# Patient Record
Sex: MALE | Race: BLACK OR AFRICAN AMERICAN | Employment: OTHER | URBAN - METROPOLITAN AREA
[De-identification: names, ages, dates, MRNs, and addresses within clinical notes are randomized per-mention and may not be internally consistent; named-entity substitution may affect disease eponyms.]

---

## 2017-05-23 ENCOUNTER — OFFICE VISIT (OUTPATIENT)
Dept: CARDIOLOGY CLINIC | Age: 67
End: 2017-05-23

## 2017-05-23 VITALS
BODY MASS INDEX: 26.6 KG/M2 | WEIGHT: 190 LBS | OXYGEN SATURATION: 97 % | DIASTOLIC BLOOD PRESSURE: 80 MMHG | HEART RATE: 97 BPM | HEIGHT: 71 IN | SYSTOLIC BLOOD PRESSURE: 200 MMHG

## 2017-05-23 DIAGNOSIS — I10 ESSENTIAL HYPERTENSION: Primary | ICD-10-CM

## 2017-05-23 DIAGNOSIS — I25.10 CORONARY ARTERY DISEASE INVOLVING NATIVE CORONARY ARTERY OF NATIVE HEART WITHOUT ANGINA PECTORIS: ICD-10-CM

## 2017-05-23 DIAGNOSIS — E78.00 HYPERCHOLESTEREMIA: ICD-10-CM

## 2017-05-23 NOTE — PROGRESS NOTES
1. Have you been to the ER, urgent care clinic since your last visit? Hospitalized since your last visit? No     2. Have you seen or consulted any other health care providers outside of the [address] [place] [address] since your last visit? Include any pap smears or colon screening.  No

## 2017-05-23 NOTE — PATIENT INSTRUCTIONS
Verbal order per Dr Danna Dong  Pt to take medication, recheck BP over at Fire station and go to Emergency Department if no improvement. Regular follow up in 1 year.

## 2017-05-23 NOTE — MR AVS SNAPSHOT
Visit Information Date & Time Provider Department Dept. Phone Encounter #  
 5/23/2017 10:40 AM Tyler Tellez MD [facility] [address] [id number] Your Appointments 6/6/2017  8:15 AM  
Follow Up with Annetta Rodriguez MD  
[facility] ([facility]) Appt Note: 6 mo f/u  
 [address], [address] [place] [address]  
  
   
 [address], [address] [place] [address] Upcoming Health Maintenance Date Due Hepatitis C Screening 1950 DTaP/Tdap/Td series (1 - Tdap) 4/3/1971 ZOSTER VACCINE AGE 60> 4/3/2010 GLAUCOMA SCREENING Q2Y 4/3/2015 INFLUENZA AGE 9 TO ADULT 8/1/2017 Pneumococcal 65+ Low/Medium Risk (2 of 2 - PPSV23) 12/6/2017 MEDICARE YEARLY EXAM 12/7/2017 COLONOSCOPY 10/28/2020 Allergies as of 5/23/2017  Review Complete On: 5/23/2017 By: Tyler Tellez MD  
  
 Severity Noted Reaction Type Reactions Lisinopril High   Anaphylaxis, Swelling Swelling of tongue, 
angioedema Current Immunizations  Never Reviewed No immunizations on file. Not reviewed this visit You Were Diagnosed With   
  
 Codes Comments Coronary artery disease involving native coronary artery of native heart without angina pectoris    -  Primary ICD-10-CM: I25.10 ICD-9-CM: 414.01 Essential hypertension     ICD-10-CM: I10 
ICD-9-CM: 401.9 Hypercholesteremia     ICD-10-CM: E78.00 ICD-9-CM: 272.0 Vitals BP Pulse Height(growth percentile) Weight(growth percentile) SpO2 BMI  
 200/80 97 5' 11\" (1.803 m) 190 lb (86.2 kg) 97% 26.5 kg/m2 Smoking Status Current Every Day Smoker Vitals History BMI and BSA Data Body Mass Index Body Surface Area  
 26.5 kg/m 2 2.08 m 2 Preferred Pharmacy Pharmacy Name Phone [facility], [address] [place] [address] Your Updated Medication List  
  
   
This list is accurate as of: 5/23/17 11:13 AM.  Always use your most recent med list. amLODIPine 10 mg tablet Commonly known as:  NORVASC  
take 1 tablet by mouth once daily  
  
 aspirin 325 mg tablet Commonly known as:  ASPIRIN Take 325 mg by mouth daily. FISH OIL 1,000 mg Cap Generic drug:  omega-3 fatty acids-vitamin e Take 1 capsule by mouth as needed. isosorbide mononitrate ER 30 mg tablet Commonly known as:  IMDUR  
take 1 tablet by mouth once daily  
  
 metoprolol succinate 200 mg XL tablet Commonly known as:  TOPROL-XL  
take 1 tablet by mouth once daily  
  
 nicotinic acid 500 mg tablet Commonly known as:  NIACIN Take 500 mg by mouth two (2) times daily (with meals). PRAVACHOL 80 mg tablet Generic drug:  pravastatin Take 80 mg by mouth daily. We Performed the Following AMB POC EKG ROUTINE W/ 12 LEADS, INTER & REP [70738 CPT(R)] Patient Instructions Verbal order per Dr Kian Morris Pt to take medication, recheck BP over at Fire station and go to Emergency Department if no improvement. Regular follow up in 1 year. Introducing [facility]! Montrell Perry introduces [employer] patient portal. Now you can access parts of your medical record, email your doctor's office, and request medication refills online. 1. In your internet browser, go to [email or web address] "[email or web address] 2. Click on the First Time User? Click Here link in the Sign In box. You will see the New Member Sign Up page. 3. Enter your [employer] Access Code exactly as it appears below. You will not need to use this code after youve completed the sign-up process. If you do not sign up before the expiration date, you must request a new code. · Open [employer] Access Code: [email or web address] Expires: 8/21/2017 10:41 AM 
 
4.  Enter the last four digits of your Social Security Number (xxxx) and Date of Birth (mm/dd/yyyy) as indicated and click Submit. You will be taken to the next sign-up page. 5. Create a [employer] ID. This will be your [employer] login ID and cannot be changed, so think of one that is secure and easy to remember. 6. Create a [employer] password. You can change your password at any time. 7. Enter your Password Reset Question and Answer. This can be used at a later time if you forget your password. 8. Enter your e-mail address. You will receive e-mail notification when new information is available in [address] 9. Click Sign Up. You can now view and download portions of your medical record. 10. Click the Download Summary menu link to download a portable copy of your medical information. If you have questions, please visit the Frequently Asked Questions section of the [employer] website. Remember, [employer] is NOT to be used for urgent needs. For medical emergencies, dial 911. Now available from your iPhone and Android! Please provide this summary of care documentation to your next provider. Your primary care clinician is listed as Nannette Encinas. If you have any questions after today's visit, please call [phone].

## 2017-05-23 NOTE — PROGRESS NOTES
HISTORY OF PRESENT ILLNESS  Pillo Neely is a 79 y.o. male. ASSESSMENT and PLAN    Mr. Esteban Felton has history of small vessel disease in his coronary circulation. He has done well on medical therapy. Unfortunately, he still smokes cigarettes about one pack per day. He also drinks beer, 2-4 beers daily. Because of erectile dysfunction, he had taken Cialis in the past. He was able to come off long-acting nitrates by cutting back on his alcohol intake.  CAD:   As noted above, he has small vessel disease and requires continued medical therapy.  BP:   Severely elevated today. He has medication noncompliance. Last time I saw the patient was in September 2015. He has not taken his medications today. I advised him to go home and take his medicines and come back for repeat blood pressure check. He is completely asymptomatic.  HR:    Upper normal.   CHF:   Currently, there is no evidence of decompensated CHF noted.  Weight:   His weight today is 190 pounds. His weight back in September 2015 was 201 pounds.  Cholesterol:   Target LDL <70. He states that he is continuing to take niacin.  Tobacco: Again, I advised him to discontinue tobacco use completely.  Alcohol: Unfortunately, he still drinks alcohol. Again, strong encouragement and lengthy discussion was carried on about the need to change his lifestyle.  Anti-platelet:   Remains on ASA. A lengthy discussion was carried on about the importance of medication compliance, follow-up compliance, and lifestyle changes. Greater than 30 minutes spent. As noted above, I did advise him take his medications as prescribed. He will have his blood pressure rechecked later today. I recommended coming back to the office; however, because of difficulty arranging rides, he may have the blood pressure check at a fire station and get back in touch with us.   I did tell him that if he his blood pressure remains elevated severely, he may need to go to the [facility] for blood pressure control. I will see him back annually. Thank you. Encounter Diagnoses   Name Primary?  Essential hypertension Yes    Coronary artery disease involving native coronary artery of native heart without angina pectoris     Hypercholesteremia      current treatment plan is effective, no change in therapy  lab results and schedule of future lab studies reviewed with patient  reviewed diet, exercise and weight control  very strongly urged to quit smoking to reduce cardiovascular risk  cardiovascular risk and specific lipid/LDL goals reviewed  use of aspirin to prevent MI and TIA's discussed      HPI   Today, Mr. de la cruz is no complaints of chest pains, increased shortness of breath or decreased exercise capacity. Unfortunately, he did not take his blood pressure medications before leaving his house this morning. Also, the last time I saw the patient was in September 2015. He has difficulty with compliance. He denies any orthopnea or PND. He denies any palpitations or dizziness. Unfortunately, he still smokes cigarettes and drinks alcohol. This is despite the fact that multiple discussions were carried on. Review of Systems   Respiratory: Negative for shortness of breath. Cardiovascular: Negative for chest pain, palpitations, orthopnea, claudication, leg swelling and PND. All other systems reviewed and are negative. Physical Exam   Constitutional: He is oriented to person, place, and time. He appears well-developed and well-nourished. HENT:   Head: Normocephalic. Eyes: Conjunctivae are normal.   Neck: Neck supple. No JVD present. Carotid bruit is not present. No thyromegaly present. Cardiovascular: Normal rate and regular rhythm. No murmur heard. Pulmonary/Chest: Breath sounds normal.   Abdominal: Bowel sounds are normal.   Musculoskeletal: He exhibits no edema. Neurological: He is alert and oriented to person, place, and time.    Skin: Skin is warm and dry.   Nursing note and vitals reviewed. PCP: Alba Reyna MD    Past Medical History:   Diagnosis Date    CAD (coronary artery disease)     Cardiac catheterization 03/23/2001    RCA patent. m/dRPDA 55%. LM patent. LAD patent. CX patent. oOMB 75%. LVEDP 8-10 mmHg. EF 50%. Global hypk. Patent bilateral renal arteries.  Cardiac echocardiogram 10/24/2012    EF 55-60%. No WMA. Mild conc LVH. Gr 1 DDfx. RVSP 20-25 mmHg. Mild LAE. Mild ADAM. Possible sm pericardial effusion but likely fat pad.  Cardiac nuclear imaging test, low risk 10/24/2012    No ischemia or prior infarction. EF 58%. Normal EKG on max EST. Ex time 6 min. Low risk. 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       Current Outpatient Prescriptions   Medication Sig Dispense Refill    metoprolol succinate (TOPROL-XL) 200 mg XL tablet take 1 tablet by mouth once daily 30 Tab 11    amLODIPine (NORVASC) 10 mg tablet take 1 tablet by mouth once daily 30 Tab 11    isosorbide mononitrate ER (IMDUR) 30 mg tablet take 1 tablet by mouth once daily 30 Tab 11    pravastatin (PRAVACHOL) 80 mg tablet Take 80 mg by mouth daily.  omega-3 fatty acids-vitamin e (FISH OIL) 1,000 mg Cap Take 1 capsule by mouth as needed.  aspirin (ASPIRIN) 325 mg tablet Take 325 mg by mouth daily.  nicotinic acid (NIACIN) 500 mg tablet Take 500 mg by mouth two (2) times daily (with meals).          The patient has a family history of    Social History   Substance Use Topics    Smoking status: Current Every Day Smoker     Packs/day: 2.50    Smokeless tobacco: Never Used    Alcohol use Yes      Comment: 10 beers a day       Lab Results   Component Value Date/Time    Cholesterol, total 191 12/06/2016 08:30 AM    HDL Cholesterol 40 12/06/2016 08:30 AM    LDL, calculated 116 12/06/2016 08:30 AM    Triglyceride 175 12/06/2016 08:30 AM    CHOL/HDL Ratio 4.8 12/06/2016 08:30 AM        BP Readings from Last 3 Encounters:   05/23/17 200/80   12/06/16 138/80   06/01/16 136/64        Pulse Readings from Last 3 Encounters:   05/23/17 97   12/06/16 84   06/01/16 86       Wt Readings from Last 3 Encounters:   05/23/17 86.2 kg (190 lb)   12/06/16 89.8 kg (198 lb)   06/01/16 89.8 kg (198 lb)         EKG: unchanged from previous tracings, normal sinus rhythm, occasional PVC noted, unifocal, LVH with repolarization changes.

## 2017-06-06 ENCOUNTER — HOSPITAL ENCOUNTER (OUTPATIENT)
Dept: LAB | Age: 67
Discharge: HOME | End: 2017-06-06
Payer: MEDICARE

## 2017-06-06 ENCOUNTER — OFFICE VISIT (OUTPATIENT)
Dept: INTERNAL MEDICINE CLINIC | Age: 67
End: 2017-06-06

## 2017-06-06 VITALS
HEIGHT: 71 IN | RESPIRATION RATE: 16 BRPM | SYSTOLIC BLOOD PRESSURE: 160 MMHG | OXYGEN SATURATION: 97 % | BODY MASS INDEX: 26.74 KG/M2 | HEART RATE: 80 BPM | DIASTOLIC BLOOD PRESSURE: 100 MMHG | TEMPERATURE: 98.6 F | WEIGHT: 191 LBS

## 2017-06-06 DIAGNOSIS — E78.2 MIXED HYPERLIPIDEMIA: ICD-10-CM

## 2017-06-06 DIAGNOSIS — F10.10 ALCOHOL ABUSE: ICD-10-CM

## 2017-06-06 DIAGNOSIS — I10 ESSENTIAL HYPERTENSION: ICD-10-CM

## 2017-06-06 DIAGNOSIS — I10 ESSENTIAL HYPERTENSION: Primary | ICD-10-CM

## 2017-06-06 LAB
ALBUMIN SERPL BCP-MCNC: 3.9 G/DL (ref 3.4–5)
ALBUMIN/GLOB SERPL: 1.1 {RATIO} (ref 0.8–1.7)
ALP SERPL-CCNC: 69 U/L (ref 45–117)
ALT SERPL-CCNC: 20 U/L (ref 16–61)
ANION GAP BLD CALC-SCNC: 8 MMOL/L (ref 3–18)
AST SERPL W P-5'-P-CCNC: 45 U/L (ref 15–37)
BASOPHILS # BLD AUTO: 0 K/UL (ref 0–0.06)
BASOPHILS # BLD: 1 % (ref 0–2)
BILIRUB SERPL-MCNC: 0.5 MG/DL (ref 0.2–1)
BUN SERPL-MCNC: 7 MG/DL (ref 7–18)
BUN/CREAT SERPL: 10 (ref 12–20)
CALCIUM SERPL-MCNC: 9.4 MG/DL (ref 8.5–10.1)
CHLORIDE SERPL-SCNC: 99 MMOL/L (ref 100–108)
CHOLEST SERPL-MCNC: 172 MG/DL
CO2 SERPL-SCNC: 28 MMOL/L (ref 21–32)
CREAT SERPL-MCNC: 0.71 MG/DL (ref 0.6–1.3)
DIFFERENTIAL METHOD BLD: ABNORMAL
EOSINOPHIL # BLD: 0.1 K/UL (ref 0–0.4)
EOSINOPHIL NFR BLD: 1 % (ref 0–5)
ERYTHROCYTE [DISTWIDTH] IN BLOOD BY AUTOMATED COUNT: 18.5 % (ref 11.6–14.5)
GLOBULIN SER CALC-MCNC: 3.7 G/DL (ref 2–4)
GLUCOSE SERPL-MCNC: 108 MG/DL (ref 74–99)
HCT VFR BLD AUTO: 38.4 % (ref 36–48)
HDLC SERPL-MCNC: 54 MG/DL (ref 40–60)
HDLC SERPL: 3.2 {RATIO} (ref 0–5)
HGB BLD-MCNC: 12.3 G/DL (ref 13–16)
LDLC SERPL CALC-MCNC: 92 MG/DL (ref 0–100)
LIPID PROFILE,FLP: NORMAL
LYMPHOCYTES # BLD AUTO: 34 % (ref 21–52)
LYMPHOCYTES # BLD: 1.3 K/UL (ref 0.9–3.6)
MCH RBC QN AUTO: 31.4 PG (ref 24–34)
MCHC RBC AUTO-ENTMCNC: 32 G/DL (ref 31–37)
MCV RBC AUTO: 98 FL (ref 74–97)
MONOCYTES # BLD: 0.4 K/UL (ref 0.05–1.2)
MONOCYTES NFR BLD AUTO: 10 % (ref 3–10)
NEUTS SEG # BLD: 2.1 K/UL (ref 1.8–8)
NEUTS SEG NFR BLD AUTO: 54 % (ref 40–73)
PLATELET # BLD AUTO: 167 K/UL (ref 135–420)
PMV BLD AUTO: 11.8 FL (ref 9.2–11.8)
POTASSIUM SERPL-SCNC: 3.3 MMOL/L (ref 3.5–5.5)
PROT SERPL-MCNC: 7.6 G/DL (ref 6.4–8.2)
RBC # BLD AUTO: 3.92 M/UL (ref 4.7–5.5)
SODIUM SERPL-SCNC: 135 MMOL/L (ref 136–145)
TRIGL SERPL-MCNC: 130 MG/DL (ref ?–150)
VLDLC SERPL CALC-MCNC: 26 MG/DL
WBC # BLD AUTO: 3.9 K/UL (ref 4.6–13.2)

## 2017-06-06 PROCEDURE — 85025 COMPLETE CBC W/AUTO DIFF WBC: CPT | Performed by: INTERNAL MEDICINE

## 2017-06-06 PROCEDURE — 80053 COMPREHEN METABOLIC PANEL: CPT | Performed by: INTERNAL MEDICINE

## 2017-06-06 PROCEDURE — 80061 LIPID PANEL: CPT | Performed by: INTERNAL MEDICINE

## 2017-06-06 RX ORDER — SPIRONOLACTONE 25 MG/1
TABLET ORAL
Qty: 180 TAB | Refills: 1 | Status: SHIPPED | OUTPATIENT
Start: 2017-06-06 | End: 2019-03-28 | Stop reason: SDUPTHER

## 2017-06-06 NOTE — PROGRESS NOTES
1. Have you been to the ER, urgent care clinic since your last visit? Hospitalized since your last visit? No    2. Have you seen or consulted any other health care providers outside of the [address] [place] [address] since your last visit? Include any pap smears or colon screening.  No

## 2017-06-06 NOTE — PROGRESS NOTES
HPI:   routine f/u of HTN, hyperlipidemia, CAD, alcoholism  Still drinking heavily  Chol 191 Dec 2016  w/o chest pain/abd. discomfort; no dyspnea, cough or pedal edema; denies constitutional complaints of fever, night sweats or wt loss; no evidence of GI/ hemorrhage; no polyuria/polydipsia. Activity is age appropriate. ROS is otherwise negative. Past Medical History:   Diagnosis Date    CAD (coronary artery disease)     Cardiac catheterization 03/23/2001    RCA patent. m/dRPDA 55%. LM patent. LAD patent. CX patent. oOMB 75%. LVEDP 8-10 mmHg. EF 50%. Global hypk. Patent bilateral renal arteries.  Cardiac echocardiogram 10/24/2012    EF 55-60%. No WMA. Mild conc LVH. Gr 1 DDfx. RVSP 20-25 mmHg. Mild LAE. Mild ADAM. Possible sm pericardial effusion but likely fat pad.  Cardiac nuclear imaging test, low risk 10/24/2012    No ischemia or prior infarction. EF 58%. Normal EKG on max EST. Ex time 6 min. Low risk. 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       Social History     Social History    Marital status: SINGLE     Spouse name: N/A    Number of children: N/A    Years of education: N/A     Occupational History    Not on file.      Social History Main Topics    Smoking status: Current Every Day Smoker     Packs/day: 2.50    Smokeless tobacco: Never Used    Alcohol use Yes      Comment: 10 beers a day    Drug use: No    Sexual activity: Not on file     Other Topics Concern    Not on file     Social History Narrative       Allergies   Allergen Reactions    Lisinopril Anaphylaxis and Swelling     Swelling of tongue,  angioedema       Family History   Problem Relation Age of Onset    Heart Disease Mother     Heart Surgery Mother     Dementia Father        Current Outpatient Prescriptions   Medication Sig Dispense Refill    metoprolol succinate (TOPROL-XL) 200 mg XL tablet take 1 tablet by mouth once daily 30 Tab 11    amLODIPine (NORVASC) 10 mg tablet take 1 tablet by mouth once daily 30 Tab 11    isosorbide mononitrate ER (IMDUR) 30 mg tablet take 1 tablet by mouth once daily 30 Tab 11    pravastatin (PRAVACHOL) 80 mg tablet Take 80 mg by mouth daily.  omega-3 fatty acids-vitamin e (FISH OIL) 1,000 mg Cap Take 1 capsule by mouth as needed.  aspirin (ASPIRIN) 325 mg tablet Take 325 mg by mouth daily.  nicotinic acid (NIACIN) 500 mg tablet Take 500 mg by mouth two (2) times daily (with meals). Visit Vitals    BP (!) 160/100 (BP 1 Location: Left arm, BP Patient Position: Sitting)    Pulse 80    Temp 98.6 °F (37 °C) (Tympanic)    Resp 16    Ht 5' 11\" (1.803 m)    Wt 191 lb (86.6 kg)    SpO2 97%    BMI 26.64 kg/m2       PE  Well nourished in NAD  HEENT:   OP: clear. Neck: supple w/o mass or bruits. Chest: clear. CV: RRR w/o m,r,g; pulses intact. Abd: soft, NT, w/o HSM or mass. Rectal: heme neg; prostate 3 FBS and smooth  Ext: w/o edema. Neuro: NF. Assessment and Plan    Encounter Diagnoses   Name Primary?  Essential hypertension Yes    Mixed hyperlipidemia     Alcohol abuse    HTN - uncontrolled; add aldactone 25 mg bid  Hyperlipidemia - labs soon to assess  Alcoholism - decrease intake with optimal goal of stopping  Keep f/u with cards - stable CAD  I have reviewed/discussed the above normal BMI with the patient. I have recommended the following interventions: dietary management education, guidance, and counseling . Nicole Landa The patient was counseled on the dangers of tobacco use, and was advised to quit.   Reviewed strategies to maximize success, including removing cigarettes and smoking materials from environment.   OV 6 mos or prn  I have explained plan to patient and the patient verbalizes understanding

## 2017-06-06 NOTE — MR AVS SNAPSHOT
Visit Information Date & Time Provider Department Dept. Phone Encounter #  
 6/6/2017  8:15 AM Crow Small MD [facility] [place]li Us [phone] [id number] Follow-up Instructions Return if symptoms worsen or fail to improve. Follow-up and Disposition History Your Appointments 5/22/2018 11:00 AM  
Follow Up with Mariana Gandara MD  
[facility] ([facility]) Appt Note: 1 year follow up with an EKG  
 Wyatt Donovan [id number] [phone] [address] Upcoming Health Maintenance Date Due Hepatitis C Screening 1950 DTaP/Tdap/Td series (1 - Tdap) 4/3/1971 ZOSTER VACCINE AGE 60> 4/3/2010 GLAUCOMA SCREENING Q2Y 4/3/2015 INFLUENZA AGE 9 TO ADULT 8/1/2017 Pneumococcal 65+ Low/Medium Risk (2 of 2 - PPSV23) 12/6/2017 MEDICARE YEARLY EXAM 12/7/2017 COLONOSCOPY 10/28/2020 Allergies as of 6/6/2017  Review Complete On: 6/6/2017 By: Crow Small MD  
  
 Severity Noted Reaction Type Reactions Lisinopril High   Anaphylaxis, Swelling Swelling of tongue, 
angioedema Current Immunizations  Never Reviewed No immunizations on file. Not reviewed this visit You Were Diagnosed With   
  
 Codes Comments Essential hypertension    -  Primary ICD-10-CM: I10 
ICD-9-CM: 401.9 Mixed hyperlipidemia     ICD-10-CM: E78.2 ICD-9-CM: 272.2 Alcohol abuse     ICD-10-CM: F10.10 ICD-9-CM: 305.00 Vitals BP Pulse Temp Resp Height(growth percentile) Weight(growth percentile) (!) 160/100 (BP 1 Location: Left arm, BP Patient Position: Sitting) 80 98.6 °F (37 °C) (Tympanic) 16 5' 11\" (1.803 m) 191 lb (86.6 kg) SpO2 BMI Smoking Status 97% 26.64 kg/m2 Current Every Day Smoker Vitals History BMI and BSA Data Body Mass Index Body Surface Area 26.64 kg/m 2 2.08 m 2 Preferred Pharmacy [facility]acy Name Phone [facility] [address] Your Updated Medication List  
  
   
This list is accurate as of: 17  8:46 AM.  Always use your most recent med list. amLODIPine 10 mg tablet Commonly known as:  NORVASC  
take 1 tablet by mouth once daily  
  
 aspirin 325 mg tablet Commonly known as:  ASPIRIN Take 325 mg by mouth daily. FISH OIL 1,000 mg Cap Generic drug:  omega-3 fatty acids-vitamin e Take 1 capsule by mouth as needed. isosorbide mononitrate ER 30 mg tablet Commonly known as:  IMDUR  
take 1 tablet by mouth once daily  
  
 metoprolol succinate 200 mg XL tablet Commonly known as:  TOPROL-XL  
take 1 tablet by mouth once daily  
  
 nicotinic acid 500 mg tablet Commonly known as:  NIACIN Take 500 mg by mouth two (2) times daily (with meals). PRAVACHOL 80 mg tablet Generic drug:  pravastatin Take 80 mg by mouth daily. spironolactone 25 mg tablet Commonly known as:  ALDACTONE  
1 bid Prescriptions Sent to Pharmacy Refills  
 spironolactone (ALDACTONE) 25 mg tablet 1 Si bid Class: Normal  
 [facility] [address] [facility] #: [phone] Follow-up Instructions Return if symptoms worsen or fail to improve. Patient Instructions Learning About Alcohol Abuse and Addiction in Teens What is alcohol abuse? Alcohol abuse means having unhealthy or dangerous drinking habits, such as drinking every day or drinking too much at a time. Alcohol abuse can harm you and may cause you to harm others. You may think a drink or two is okay, even if it is illegal. But teens who drink are more likely to develop an alcohol problem than people who start drinking later in life. Teens who continue to abuse alcohol may develop a strong need, or craving, for alcohol, and it may get harder to say \"no\" to drinking. You may begin to find alcohol more fun than anything else. Or you may want to stop drinking but can't. You may become addicted to alcohol. This is also called alcoholism. If you become addicted, then alcohol controls your life. You may continue to drink even though it can harm your relationships, lead to trouble with the law, and/or cause physical problems. Why do teens drink alcohol? Teens may use alcohol for many reasons. They may want to: · Fit in with friends or certain groups. · Feel good. · Seem more grown up. · Rebel against parents. · Escape problems. For example, teens may drink to try to: ¨ Get rid of the symptoms of mental health problems such as attention deficit hyperactivity disorder (ADHD) or depression. ¨ Ease feelings of insecurity. ¨ Forget about physical or sexual abuse. What problems can alcohol cause? Alcohol can change how well you make decisions, how well you think, and how quickly you can react. It can make it hard for you to control your actions. Alcohol use can: · Make car accidents more likely. If you drink and drive, you can easily have an accident and hurt yourself or others. Do not drive if you have been drinking, and do not ride in a car (or any type of vehicle) with someone who has been drinking. · Lead to unprotected sex and/or sexual assault. This can lead to pregnancy and sexually transmitted infections, including HIV. · Cause you to do things you wouldn't usually do. You may say things that hurt your friends or do something illegal that could result in paying a large fine, losing your 's license, or having other legal problems. · Cause you to lose interest in school and your future. Poor grades or lack of focus may make it harder to reach your dreams. Alcohol use also can change how you feel about your life.  It can lead to depression and suicide. How do you say no to alcohol? If someone offers you a drink, here are some ways to say \"no. \" · Look the person in the eye and say \"No thanks. \" Sometimes that is all you need to do. Say it as many times as you need to. Also ask the person not to ask you again: \"I'm cool with my decision, so don't bother me again. \" · Say why you don't want to drink. Here are some examples: \"I don't like how I act when I'm drinking,\" \"I like to know what I'm doing,\" \"If my parents find out, they'll take my car away,\" or \"I have to practice with my band tomorrow. \" 
· Walk out. It's okay to leave a party or group where others are drinking. · Offer another idea. \"I'd rather play video games\" or \"Let's listen to some music. \" By doing this, you might also prevent your friend from drinking. · Ask for respect. Make it clear that you don't want to drink and that continuing to ask you is showing no respect for your opinions. \"I don't give you a hard time, so why are you giving me a hard time? \" · Think ahead. If you think you might go someplace where people are drinking, don't go. But if you do go, think in advance about what you will do if someone offers you a drink. How is alcohol abuse treated? Treatment depends on how bad your alcohol problem is. Some teens are able to stop drinking with help from a school alcohol education program or a counselor. Treatment also can include group therapy. Teens who are addicted to alcohol may need medical treatment and may need to stay in a hospital or treatment center. Treatment focuses on more than alcohol. It also helps you cope with the anger, frustration, sadness, and disappointment that often happen when a person tries to stop drinking. Treatment also looks at other parts of your life, like your relationships with friends and family, school and work, medical problems, and living situation. It helps you find and manage problems.  Treatment helps you take control of your life so you don't have to depend on alcohol. An alcohol problem affects the whole family. Family counseling often is part of treatment. Where can you learn more? Go to http://ivonne-jimy.info/. Enter K930 in the search box to learn more about \"Learning About Alcohol Abuse and Addiction in Teens. \" Current as of: November 3, 2016 Content Version: 11.2 © 4677-3041 [employer]. Care instructions adapted under license by [employer] (which disclaims liability or warranty for this information). If you have questions about a medical condition or this instruction, always ask your healthcare professional. [place] [address] any warranty or liability for your use of this information. Introducing hospitals & HEALTH SERVICES! [employer] introduces Advanced Telemetry patient portal. Now you can access parts of your medical record, email your doctor's office, and request medication refills online. 1. In your internet browser, go to [email or web address] 2. Click on the First Time User? Click Here link in the Sign In box. You will see the New Member Sign Up page. 3. Enter your Advanced Telemetry Access Code exactly as it appears below. You will not need to use this code after youve completed the sign-up process. If you do not sign up before the expiration date, you must request a new code. · Advanced Telemetry Access Code: [email or web address] Expires: 8/21/2017 10:41 AM 
 
4. Enter the last four digits of your Social Security Number (xxxx) and Date of Birth (mm/dd/yyyy) as indicated and click Submit. You will be taken to the next sign-up page. 5. Create a Advanced Telemetry ID. This will be your Advanced Telemetry login ID and cannot be changed, so think of one that is secure and easy to remember. 6. Create a Advanced Telemetry password. You can change your password at any time. 7. Enter your Password Reset Question and Answer.  This can be used at a later time if you forget your password. 8. Enter your e-mail address. You will receive e-mail notification when new information is available in [address] 9. Click Sign Up. You can now view and download portions of your medical record. 10. Click the Download Summary menu link to download a portable copy of your medical information. If you have questions, please visit the Frequently Asked Questions section of the [employer] website. Remember, [employer] is NOT to be used for urgent needs. For medical emergencies, dial 911. Now available from your iPhone and Android! Please provide this summary of care documentation to your next provider. Your primary care clinician is listed as Miky June. If you have any questions after today's visit, please call [phone].

## 2017-06-06 NOTE — PATIENT INSTRUCTIONS
Learning About Alcohol Abuse and Addiction in Teens  What is alcohol abuse? Alcohol abuse means having unhealthy or dangerous drinking habits, such as drinking every day or drinking too much at a time. Alcohol abuse can harm you and may cause you to harm others. You may think a drink or two is okay, even if it is illegal. But teens who drink are more likely to develop an alcohol problem than people who start drinking later in life. Teens who continue to abuse alcohol may develop a strong need, or craving, for alcohol, and it may get harder to say \"no\" to drinking. You may begin to find alcohol more fun than anything else. Or you may want to stop drinking but can't. You may become addicted to alcohol. This is also called alcoholism. If you become addicted, then alcohol controls your life. You may continue to drink even though it can harm your relationships, lead to trouble with the law, and/or cause physical problems. Why do teens drink alcohol? Teens may use alcohol for many reasons. They may want to:  · Fit in with friends or certain groups. · Feel good. · Seem more grown up. · Rebel against parents. · Escape problems. For example, teens may drink to try to:  ¨ Get rid of the symptoms of mental health problems such as attention deficit hyperactivity disorder (ADHD) or depression. ¨ Ease feelings of insecurity. ¨ Forget about physical or sexual abuse. What problems can alcohol cause? Alcohol can change how well you make decisions, how well you think, and how quickly you can react. It can make it hard for you to control your actions. Alcohol use can:  · Make car accidents more likely. If you drink and drive, you can easily have an accident and hurt yourself or others. Do not drive if you have been drinking, and do not ride in a car (or any type of vehicle) with someone who has been drinking. · Lead to unprotected sex and/or sexual assault.  This can lead to pregnancy and sexually transmitted infections, including HIV. · Cause you to do things you wouldn't usually do. You may say things that hurt your friends or do something illegal that could result in paying a large fine, losing your 's license, or having other legal problems. · Cause you to lose interest in school and your future. Poor grades or lack of focus may make it harder to reach your dreams. Alcohol use also can change how you feel about your life. It can lead to depression and suicide. How do you say no to alcohol? If someone offers you a drink, here are some ways to say \"no. \"  · Look the person in the eye and say \"No thanks. \" Sometimes that is all you need to do. Say it as many times as you need to. Also ask the person not to ask you again: \"I'm cool with my decision, so don't bother me again. \"  · Say why you don't want to drink. Here are some examples: \"I don't like how I act when I'm drinking,\" \"I like to know what I'm doing,\" \"If my parents find out, they'll take my car away,\" or \"I have to practice with my band tomorrow. \"  · Walk out. It's okay to leave a party or group where others are drinking. · Offer another idea. \"I'd rather play video games\" or \"Let's listen to some music. \" By doing this, you might also prevent your friend from drinking. · Ask for respect. Make it clear that you don't want to drink and that continuing to ask you is showing no respect for your opinions. \"I don't give you a hard time, so why are you giving me a hard time? \"  · Think ahead. If you think you might go someplace where people are drinking, don't go. But if you do go, think in advance about what you will do if someone offers you a drink. How is alcohol abuse treated? Treatment depends on how bad your alcohol problem is. Some teens are able to stop drinking with help from a school alcohol education program or a counselor. Treatment also can include group therapy.  Teens who are addicted to alcohol may need medical treatment and may need to stay in a hospital or treatment center. Treatment focuses on more than alcohol. It also helps you cope with the anger, frustration, sadness, and disappointment that often happen when a person tries to stop drinking. Treatment also looks at other parts of your life, like your relationships with friends and family, school and work, medical problems, and living situation. It helps you find and manage problems. Treatment helps you take control of your life so you don't have to depend on alcohol. An alcohol problem affects the whole family. Family counseling often is part of treatment. Where can you learn more? Go to http://ivonneTantalus Systemsjimy.info/. Enter I798 in the search box to learn more about \"Learning About Alcohol Abuse and Addiction in Teens. \"  Current as of: November 3, 2016  Content Version: 11.2  © 9837-5004 [employer]. Care instructions adapted under license by [employer] (which disclaims liability or warranty for this information). If you have questions about a medical condition or this instruction, always ask your healthcare professional. [place] [address] any warranty or liability for your use of this information.

## 2017-08-06 RX ORDER — ISOSORBIDE MONONITRATE 30 MG/1
TABLET, EXTENDED RELEASE ORAL
Qty: 30 TAB | Refills: 11 | Status: SHIPPED | OUTPATIENT
Start: 2017-08-06 | End: 2018-08-18 | Stop reason: SDUPTHER

## 2017-08-06 RX ORDER — METOPROLOL SUCCINATE 200 MG/1
TABLET, EXTENDED RELEASE ORAL
Qty: 30 TAB | Refills: 11 | Status: SHIPPED | OUTPATIENT
Start: 2017-08-06 | End: 2018-08-18 | Stop reason: SDUPTHER

## 2017-08-06 RX ORDER — AMLODIPINE BESYLATE 10 MG/1
TABLET ORAL
Qty: 30 TAB | Refills: 11 | Status: SHIPPED | OUTPATIENT
Start: 2017-08-06 | End: 2018-08-18 | Stop reason: SDUPTHER

## 2018-06-06 RX ORDER — SPIRONOLACTONE 25 MG/1
TABLET ORAL
Qty: 180 TAB | Refills: 1 | OUTPATIENT
Start: 2018-06-06

## 2018-06-06 RX ORDER — PRAVASTATIN SODIUM 80 MG/1
80 TABLET ORAL DAILY
Qty: 60 TAB | Refills: 1 | OUTPATIENT
Start: 2018-06-06

## 2018-06-06 NOTE — TELEPHONE ENCOUNTER
Requested Prescriptions     Pending Prescriptions Disp Refills    pravastatin (PRAVACHOL) 80 mg tablet       Sig: Take 1 Tab by mouth daily.     spironolactone (ALDACTONE) 25 mg tablet 180 Tab 1     Si bid

## 2018-06-08 NOTE — TELEPHONE ENCOUNTER
Patient notified that he needs a follow up appointment prior to any refills voices understanding and said he will call back to schedule an appointment.

## 2018-07-16 ENCOUNTER — HOSPITAL ENCOUNTER (EMERGENCY)
Age: 68
Discharge: HOME | End: 2018-07-16
Attending: EMERGENCY MEDICINE
Payer: MEDICARE

## 2018-07-16 ENCOUNTER — APPOINTMENT (OUTPATIENT)
Dept: CT IMAGING | Age: 68
End: 2018-07-16
Attending: EMERGENCY MEDICINE
Payer: MEDICARE

## 2018-07-16 VITALS
WEIGHT: 185 LBS | HEIGHT: 71 IN | OXYGEN SATURATION: 100 % | HEART RATE: 89 BPM | DIASTOLIC BLOOD PRESSURE: 79 MMHG | BODY MASS INDEX: 25.9 KG/M2 | RESPIRATION RATE: 12 BRPM | SYSTOLIC BLOOD PRESSURE: 172 MMHG | TEMPERATURE: 97.5 F

## 2018-07-16 DIAGNOSIS — F10.920 ALCOHOLIC INTOXICATION WITHOUT COMPLICATION (HCC): Primary | ICD-10-CM

## 2018-07-16 DIAGNOSIS — S00.83XA CONTUSION OF OTHER PART OF HEAD, INITIAL ENCOUNTER: ICD-10-CM

## 2018-07-16 LAB
AMPHET UR QL SCN: NEGATIVE
APPEARANCE UR: CLEAR
BACTERIA URNS QL MICRO: NEGATIVE /HPF
BARBITURATES UR QL SCN: NEGATIVE
BENZODIAZ UR QL: NEGATIVE
BILIRUB UR QL: NEGATIVE
CANNABINOIDS UR QL SCN: NEGATIVE
COCAINE UR QL SCN: NEGATIVE
COLOR UR: YELLOW
EPITH CASTS URNS QL MICRO: NORMAL /LPF (ref 0–5)
GLUCOSE UR STRIP.AUTO-MCNC: NEGATIVE MG/DL
HDSCOM,HDSCOM: NORMAL
HGB UR QL STRIP: NEGATIVE
KETONES UR QL STRIP.AUTO: NEGATIVE MG/DL
LEUKOCYTE ESTERASE UR QL STRIP.AUTO: NEGATIVE
METHADONE UR QL: NEGATIVE
NITRITE UR QL STRIP.AUTO: NEGATIVE
OPIATES UR QL: NEGATIVE
PCP UR QL: NEGATIVE
PH UR STRIP: 6 [PH] (ref 5–8)
PROT UR STRIP-MCNC: NEGATIVE MG/DL
RBC #/AREA URNS HPF: NEGATIVE /HPF (ref 0–5)
SP GR UR REFRACTOMETRY: 1.01 (ref 1–1.03)
UROBILINOGEN UR QL STRIP.AUTO: 0.2 EU/DL (ref 0.2–1)
WBC URNS QL MICRO: NEGATIVE /HPF (ref 0–4)

## 2018-07-16 PROCEDURE — 70450 CT HEAD/BRAIN W/O DYE: CPT

## 2018-07-16 PROCEDURE — 80307 DRUG TEST PRSMV CHEM ANLYZR: CPT | Performed by: EMERGENCY MEDICINE

## 2018-07-16 PROCEDURE — 81001 URINALYSIS AUTO W/SCOPE: CPT | Performed by: EMERGENCY MEDICINE

## 2018-07-16 PROCEDURE — 99283 EMERGENCY DEPT VISIT LOW MDM: CPT

## 2018-07-16 NOTE — DISCHARGE INSTRUCTIONS
Head Injury: Care Instructions  Your Care Instructions    Most injuries to the head are minor. Bumps, cuts, and scrapes on the head and face usually heal well and can be treated the same as injuries to other parts of the body. Although it's rare, once in a while a more serious problem shows up after you are home. So it's good to be on the lookout for symptoms for a day or two. Follow-up care is a key part of your treatment and safety. Be sure to make and go to all appointments, and call your doctor if you are having problems. It's also a good idea to know your test results and keep a list of the medicines you take. How can you care for yourself at home? · Follow your doctor's instructions. He or she will tell you if you need someone to watch you closely for the next 24 hours or longer. · Take it easy for the next few days or more if you are not feeling well. · Ask your doctor when it's okay for you to go back to activities like driving a car, riding a bike, or operating machinery. When should you call for help? Call 911 anytime you think you may need emergency care. For example, call if:    · You have a seizure.     · You passed out (lost consciousness).     · You are confused or can't stay awake.    Call your doctor now or seek immediate medical care if:    · You have new or worse vomiting.     · You feel less alert.     · You have new weakness or numbness in any part of your body.    Watch closely for changes in your health, and be sure to contact your doctor if:    · You do not get better as expected.     · You have new symptoms, such as headaches, trouble concentrating, or changes in mood. Where can you learn more? Go to http://ivonne-jimy.info/. Enter G510 in the search box to learn more about \"Head Injury: Care Instructions. \"  Current as of: October 9, 2017  Content Version: 11.7  © 0256-6320 [employer].  Care instructions adapted under license by [employer] (which disclaims liability or warranty for this information). If you have questions about a medical condition or this instruction, always ask your healthcare professional. [address] any warranty or liability for your use of this information.

## 2018-07-16 NOTE — ED TRIAGE NOTES
\"I drank too much. \"  The patient denies any complaints at this time. Admits to drinking daily since his father passed away two weeks ago.

## 2018-07-16 NOTE — ED PROVIDER NOTES
EMERGENCY DEPARTMENT HISTORY AND PHYSICAL EXAM    5:23 PM      Date: 2018  Patient Name: Chapin Davidson    History of Presenting Illness     Chief Complaint   Patient presents with    Alcohol intoxication         History Provided By: Pt    Chief Complaint: HA  Duration: 1 day  Timing: Acute, constant  Location: Head  Quality: Aching  Severity: moderate  Modifying Factors: No modifying or aggravating factors were reported. Associated Symptoms: No associated Sx      Additional History (Context): 5:25 PM Chapin Davidson is a 76 y.o. male with h/o HTN, alcoholism, and CAD who presents to ED via EMS complaining of acute and constant moderate aching HA onset 1 day ago. No modifying or aggravating factors were reported. No associated Sx reported. Several weeks ago, Pt's father , since which time he has been drinking. He claims he \"drank too much\". 1 day ago, he fell and hit his head on the ground, which gave him a HA. He does not believe he needs counseling or professional help at this time but does want a ride home. Denies abd pain, dizziness, n/v/d, and fevers. Denies any further complaints or symptoms at the moment. PCP: Darrel Lopez MD    Current Outpatient Prescriptions   Medication Sig Dispense Refill    metoprolol succinate (TOPROL-XL) 200 mg XL tablet take 1 tablet by mouth once daily 30 Tab 11    amLODIPine (NORVASC) 10 mg tablet take 1 tablet by mouth once daily 30 Tab 11    isosorbide mononitrate ER (IMDUR) 30 mg tablet take 1 tablet by mouth once daily 30 Tab 11    spironolactone (ALDACTONE) 25 mg tablet 1 bid 180 Tab 1    pravastatin (PRAVACHOL) 80 mg tablet Take 80 mg by mouth daily.  omega-3 fatty acids-vitamin e (FISH OIL) 1,000 mg Cap Take 1 capsule by mouth as needed.  aspirin (ASPIRIN) 325 mg tablet Take 325 mg by mouth daily.  nicotinic acid (NIACIN) 500 mg tablet Take 500 mg by mouth two (2) times daily (with meals).          Past History     Past Medical History:  Past Medical History:   Diagnosis Date    CAD (coronary artery disease)     Cardiac catheterization 03/23/2001    RCA patent. m/dRPDA 55%. LM patent. LAD patent. CX patent. oOMB 75%. LVEDP 8-10 mmHg. EF 50%. Global hypk. Patent bilateral renal arteries.  Cardiac echocardiogram 10/24/2012    EF 55-60%. No WMA. Mild conc LVH. Gr 1 DDfx. RVSP 20-25 mmHg. Mild LAE. Mild ADAM. Possible sm pericardial effusion but likely fat pad.  Cardiac nuclear imaging test, low risk 10/24/2012    No ischemia or prior infarction. EF 58%. Normal EKG on max EST. Ex time 6 min. Low risk.  History of alcoholism ([address]     Personal history      Hypercholesteremia     Hypertension     Hypertrophy (benign) of prostate     Without urinary obstruction and other lower urinary tract symptoms (LUTS)    Mixed hyperlipidemia        Past Surgical History:  Past Surgical History:   Procedure Laterality Date    HX COLONOSCOPY  10/2015    neg; h/o polyps    HX HEART CATHETERIZATION  03/23/2001    HX OTHER SURGICAL      sgy to correct club foot       Family History:  Family History   Problem Relation Age of Onset    Heart Disease Mother     Heart Surgery Mother     Dementia Father        Social History:  Social History   Substance Use Topics    Smoking status: Current Every Day Smoker     Packs/day: 2.50    Smokeless tobacco: Never Used    Alcohol use Yes      Comment: 10 beers a day       Allergies: Allergies   Allergen Reactions    Lisinopril Anaphylaxis and Swelling     Swelling of tongue,  angioedema         Review of Systems     Review of Systems   Constitutional: Negative for diaphoresis and fever. HENT: Negative for congestion and sore throat. Eyes: Negative for pain and itching. Respiratory: Negative for cough and shortness of breath. Cardiovascular: Negative for chest pain and palpitations. Gastrointestinal: Negative for abdominal pain, diarrhea, nausea and vomiting.    Endocrine: Negative for polydipsia and polyuria. Genitourinary: Negative for dysuria and hematuria. Musculoskeletal: Negative for arthralgias and myalgias. Skin: Negative for rash and wound. Neurological: Positive for headaches. Negative for dizziness, seizures and syncope. Hematological: Does not bruise/bleed easily. Psychiatric/Behavioral: Negative for agitation and hallucinations. Physical Exam     Visit Vitals    /79 (BP 1 Location: Left arm, BP Patient Position: At rest)    Pulse 89    Temp 97.5 °F (36.4 °C)    Resp 12    Ht 5' 11\" (1.803 m)    Wt 83.9 kg (185 lb)    SpO2 100%    BMI 25.8 kg/m2       Physical Exam   Constitutional: He appears well-developed and well-nourished. HENT:   Head: Normocephalic and atraumatic. Eyes: Conjunctivae are normal. No scleral icterus. Neck: Normal range of motion. Neck supple. No JVD present. Cardiovascular: Normal rate, regular rhythm and normal heart sounds. 4 intact extremity pulses   Pulmonary/Chest: Effort normal and breath sounds normal.   Abdominal: Soft. He exhibits no mass. There is no tenderness. Musculoskeletal: Normal range of motion. Lymphadenopathy:     He has no cervical adenopathy. Neurological: He is alert. Neuro intact    Skin: Skin is warm and dry. Abrasion (left forehead) noted. Nursing note and vitals reviewed.         Diagnostic Study Results   Labs -  Recent Results (from the past 12 hour(s))   URINALYSIS W/MICROSCOPIC    Collection Time: 07/16/18  5:44 PM   Result Value Ref Range    Color YELLOW      Appearance CLEAR      Specific gravity 1.008 1.005 - 1.030      pH (UA) 6.0 5.0 - 8.0      Protein NEGATIVE  NEG mg/dL    Glucose NEGATIVE  NEG mg/dL    Ketone NEGATIVE  NEG mg/dL    Bilirubin NEGATIVE  NEG      Blood NEGATIVE  NEG      Urobilinogen 0.2 0.2 - 1.0 EU/dL    Nitrites NEGATIVE  NEG      Leukocyte Esterase NEGATIVE  NEG      WBC PENDING /hpf    RBC PENDING /hpf    Epithelial cells PENDING /lpf Bacteria PENDING /hpf   DRUG SCREEN, URINE    Collection Time: 07/16/18  5:44 PM   Result Value Ref Range    BENZODIAZEPINES NEGATIVE  NEG      BARBITURATES NEGATIVE  NEG      THC (TH-CANNABINOL) NEGATIVE  NEG      OPIATES NEGATIVE  NEG      PCP(PHENCYCLIDINE) NEGATIVE  NEG      COCAINE NEGATIVE  NEG      AMPHETAMINES NEGATIVE  NEG      METHADONE NEGATIVE  NEG      HDSCOM (NOTE)        Radiologic Studies -   CT HEAD WO CONT   Final Result   IMPRESSION:  No evidence for acute intracranial process. .     Minimal white matter disease, presumed chronic ischemic.      Cerebral atrophy. Ct Head Wo Cont    Result Date: 7/16/2018  CT HEAD WITHOUT IV CONTRAST INDICATION: Closed head trauma COMPARISON: None. TECHNIQUE: Serial axial CT images were obtained from the skull vertex to foramen magnum without IV contrast. All CT scans at this facility are performed using dose optimization technique as appropriate to a performed exam, to include automated exposure control, adjustment of the mA and/or kV according to patient's size (including appropriate matching for site-specific examinations), or use of iterative reconstruction technique. FINDINGS: Visualized paranasal sinuses are free from significant mucosal disease. . Gray-white matter differentiation appears preserved. Minimal periventricular white matter hypoattenuation. Cerebral atrophy with associated prominence of the CSF spaces. No evidence for acute intracranial hemorrhage, acute infarct, or mass lesion. No evidence for hydrocephalus. . The calvarium appears intact. IMPRESSION: No evidence for acute intracranial process. . Minimal white matter disease, presumed chronic ischemic. Cerebral atrophy. Medications ordered:   Medications - No data to display      Medical Decision Making   Initial Medical Decision Making and DDx:  Appears slightly intoxicated. Pt is friendly, coherent, and has no complaints. Will scan his head because he fell yesterday.  Don't suspect hyponutremia, hypolipidemia, or other metabolic derangement. Will discharge if CT is normal.    ED Course: Progress Notes, Reevaluation, and Consults:  6:47 PM Pt reevaluated at this time. Discussed results and findings, as well as, diagnosis and plan for discharge. Follow up with doctors/services listed. Return to the emergency department for alarming symptoms. Pt verbalizes understanding and agreement with plan. All questions addressed. Discussed results with family. Patient has no complaints and wants to be discharged. I am the first provider for this patient. I reviewed the vital signs, available nursing notes, past medical history, past surgical history, family history and social history. Vital Signs-Reviewed the patient's vital signs. Pulse Oximetry Analysis - 100% on RA, normal    Cardiac Monitor:  Rate/Rhythm:  89 bpm    Records Reviewed: Nursing Notes and Old Medical Records (Time of Review: 5:23 PM)      Diagnosis     Clinical Impression:   1. Alcoholic intoxication without complication ([address]    2. Contusion of other part of head, initial encounter        Disposition: Discharge    Follow-up Information     Follow up With Details Comments Blue Conte MD In 2 days  [phone]             Patient's Medications   Start Taking    No medications on file   Continue Taking    AMLODIPINE (NORVASC) 10 MG TABLET    take 1 tablet by mouth once daily    ASPIRIN (ASPIRIN) 325 MG TABLET    Take 325 mg by mouth daily. ISOSORBIDE MONONITRATE ER (IMDUR) 30 MG TABLET    take 1 tablet by mouth once daily    METOPROLOL SUCCINATE (TOPROL-XL) 200 MG XL TABLET    take 1 tablet by mouth once daily    NICOTINIC ACID (NIACIN) 500 MG TABLET    Take 500 mg by mouth two (2) times daily (with meals). OMEGA-3 FATTY ACIDS-VITAMIN E (FISH OIL) 1,000 MG CAP    Take 1 capsule by mouth as needed.     PRAVASTATIN (PRAVACHOL) 80 MG TABLET    Take 80 mg by mouth daily. SPIRONOLACTONE (ALDACTONE) 25 MG TABLET    1 bid   These Medications have changed    No medications on file   Stop Taking    No medications on file     _______________________________    Attestations:  Scribe Attestation     Nando Nelsno and Horton Sicard acting as a scribe for and in the presence of Adriana Whitmore MD      July 16, 2018 at 5:23 PM       Provider Attestation:      I personally performed the services described in the documentation, reviewed the documentation, as recorded by the scribe in my presence, and it accurately and completely records my words and actions.  July 16, 2018 at 5:23 PM - Adriana Whitmore MD    _______________________________

## 2018-07-16 NOTE — ED NOTES
I have reviewed discharge instructions with the patient. The patient verbalized understanding. Pt left ED in NAD, ambulatory, a&ox4.

## 2018-08-19 RX ORDER — AMLODIPINE BESYLATE 10 MG/1
TABLET ORAL
Qty: 30 TAB | Refills: 7 | Status: SHIPPED | OUTPATIENT
Start: 2018-08-19 | End: 2019-03-28 | Stop reason: SDUPTHER

## 2018-08-19 RX ORDER — METOPROLOL SUCCINATE 200 MG/1
TABLET, EXTENDED RELEASE ORAL
Qty: 30 TAB | Refills: 5 | Status: SHIPPED | OUTPATIENT
Start: 2018-08-19 | End: 2019-03-28 | Stop reason: SDUPTHER

## 2018-08-19 RX ORDER — ISOSORBIDE MONONITRATE 30 MG/1
TABLET, EXTENDED RELEASE ORAL
Qty: 30 TAB | Refills: 7 | Status: SHIPPED | OUTPATIENT
Start: 2018-08-19 | End: 2019-03-28 | Stop reason: SDUPTHER

## 2019-03-28 ENCOUNTER — OFFICE VISIT (OUTPATIENT)
Dept: FAMILY MEDICINE CLINIC | Facility: CLINIC | Age: 69
End: 2019-03-28

## 2019-03-28 VITALS
RESPIRATION RATE: 16 BRPM | WEIGHT: 202 LBS | BODY MASS INDEX: 28.28 KG/M2 | DIASTOLIC BLOOD PRESSURE: 78 MMHG | HEART RATE: 97 BPM | OXYGEN SATURATION: 98 % | HEIGHT: 71 IN | SYSTOLIC BLOOD PRESSURE: 160 MMHG | TEMPERATURE: 98.5 F

## 2019-03-28 DIAGNOSIS — F10.10 ALCOHOL ABUSE: ICD-10-CM

## 2019-03-28 DIAGNOSIS — F17.200 TOBACCO USE DISORDER: ICD-10-CM

## 2019-03-28 DIAGNOSIS — Z13.21 ENCOUNTER FOR VITAMIN DEFICIENCY SCREENING: ICD-10-CM

## 2019-03-28 DIAGNOSIS — Z13.29 SCREENING FOR THYROID DISORDER: ICD-10-CM

## 2019-03-28 DIAGNOSIS — I10 ESSENTIAL HYPERTENSION: Primary | ICD-10-CM

## 2019-03-28 DIAGNOSIS — E78.2 MIXED HYPERLIPIDEMIA: ICD-10-CM

## 2019-03-28 DIAGNOSIS — Z12.5 PROSTATE CANCER SCREENING: ICD-10-CM

## 2019-03-28 RX ORDER — METOPROLOL SUCCINATE 200 MG/1
TABLET, EXTENDED RELEASE ORAL
Qty: 30 TAB | Refills: 0 | Status: SHIPPED | OUTPATIENT
Start: 2019-03-28 | End: 2019-09-13 | Stop reason: SDUPTHER

## 2019-03-28 RX ORDER — PRAVASTATIN SODIUM 80 MG/1
80 TABLET ORAL DAILY
Qty: 30 TAB | Refills: 0 | Status: SHIPPED | OUTPATIENT
Start: 2019-03-28 | End: 2019-04-28 | Stop reason: SDUPTHER

## 2019-03-28 RX ORDER — SPIRONOLACTONE 25 MG/1
TABLET ORAL
Qty: 60 TAB | Refills: 0 | Status: SHIPPED | OUTPATIENT
Start: 2019-03-28 | End: 2019-04-28 | Stop reason: SDUPTHER

## 2019-03-28 RX ORDER — AMLODIPINE BESYLATE 10 MG/1
TABLET ORAL
Qty: 30 TAB | Refills: 0 | Status: SHIPPED | OUTPATIENT
Start: 2019-03-28 | End: 2019-09-13 | Stop reason: SDUPTHER

## 2019-03-28 RX ORDER — ISOSORBIDE MONONITRATE 30 MG/1
TABLET, EXTENDED RELEASE ORAL
Qty: 30 TAB | Refills: 0 | Status: SHIPPED | OUTPATIENT
Start: 2019-03-28 | End: 2019-09-13 | Stop reason: SDUPTHER

## 2019-03-28 NOTE — PROGRESS NOTES
Taina Veloz is a 76 y.o. male presenting today for Establish Care  . HPI:  Taina Veloz presents to the office today to establish care with the practice. Patient is a previous patient of Dr. Barbara Jones and presents today to establish care. He has a past medical history for hypertension, coronary artery disease, hypertrophy of the prostate, tobacco and alcohol abuse, and mixed hyperlipidemia. Patient reports he feels well today and denies any pain. Patient is not compliant with taking his medication every day. He asked if he is supposed to take his medicine daily. Review of Systems   Respiratory: Negative for cough. Cardiovascular: Negative for chest pain and palpitations. Musculoskeletal: Negative for back pain, myalgias and neck pain. Neurological: Negative for dizziness and headaches. Allergies   Allergen Reactions    Lisinopril Anaphylaxis and Swelling     Swelling of tongue,  angioedema       Current Outpatient Medications   Medication Sig Dispense Refill    amLODIPine (NORVASC) 10 mg tablet TAKE 1 TABLET BY MOUTH ONCE DAILY FOR HIGH BLOOD PRESSURE 30 Tab 0    isosorbide mononitrate ER (IMDUR) 30 mg tablet TAKE 1 TABLET BY MOUTH ONCE DAILY 30 Tab 0    metoprolol succinate (TOPROL-XL) 200 mg XL tablet TAKE 1 TABLET BY MOUTH ONCE DAILY 30 Tab 0    pravastatin (PRAVACHOL) 80 mg tablet Take 1 Tab by mouth daily. 30 Tab 0    spironolactone (ALDACTONE) 25 mg tablet 1 bid 60 Tab 0    omega-3 fatty acids-vitamin e (FISH OIL) 1,000 mg Cap Take 1 capsule by mouth as needed.  aspirin (ASPIRIN) 325 mg tablet Take 325 mg by mouth daily. Past Medical History:   Diagnosis Date    CAD (coronary artery disease)     Cardiac catheterization 03/23/2001    RCA patent. m/dRPDA 55%. LM patent. LAD patent. CX patent. oOMB 75%. LVEDP 8-10 mmHg. EF 50%. Global hypk. Patent bilateral renal arteries.  Cardiac echocardiogram 10/24/2012    EF 55-60%. No WMA.   Mild conc LVH.  Gr 1 DDfx. RVSP 20-25 mmHg. Mild LAE. Mild ADAM. Possible sm pericardial effusion but likely fat pad.  Cardiac nuclear imaging test, low risk 10/24/2012    No ischemia or prior infarction. EF 58%. Normal EKG on max EST. Ex time 6 min. Low risk.     History of alcoholism ([facility] [address]     Personal history      Hypercholesteremia     Hypertension     Hypertrophy (benign) of prostate     Without urinary obstruction and other lower urinary tract symptoms (LUTS)    Mixed hyperlipidemia        Past Surgical History:   Procedure Laterality Date    HX COLONOSCOPY  10/2015    neg; h/o polyps    HX HEART CATHETERIZATION  03/23/2001    HX OTHER SURGICAL      sgy to correct club foot       Social History     Socioeconomic History    Marital status: SINGLE     Spouse name: Not on file    Number of children: Not on file    Years of education: Not on file    Highest education level: Not on file   Occupational History    Not on file   Social Needs    Financial resource strain: Not on file    Food insecurity:     Worry: Not on file     Inability: Not on file    Transportation needs:     Medical: Not on file     Non-medical: Not on file   Tobacco Use    Smoking status: Current Every Day Smoker     Packs/day: 2.50    Smokeless tobacco: Never Used   Substance and Sexual Activity    Alcohol use: Yes     Comment: 10 beers a day    Drug use: No    Sexual activity: Not on file   Lifestyle    Physical activity:     Days per week: Not on file     Minutes per session: Not on file    Stress: Not on file   Relationships    Social connections:     Talks on phone: Not on file     Gets together: Not on file     Attends Rastafarian service: Not on file     Active member of club or organization: Not on file     Attends meetings of clubs or organizations: Not on file     Relationship status: Not on file    Intimate partner violence:     Fear of current or ex partner: Not on file     Emotionally abused: Not on file Physically abused: Not on file     Forced sexual activity: Not on file   Other Topics Concern    Not on file   Social History Narrative    Not on file       Patient does not have an advanced directive on file    Vitals:    03/28/19 1301   BP: 160/78   Pulse: 97   Resp: 16   Temp: 98.5 °F (36.9 °C)   TempSrc: Tympanic   SpO2: 98%   Weight: 202 lb (91.6 kg)   Height: 5' 11\" (1.803 m)   PainSc:   0 - No pain       Physical Exam   Constitutional: No distress. Cardiovascular: Normal rate, regular rhythm and normal heart sounds. Pulmonary/Chest: Effort normal and breath sounds normal.   Musculoskeletal: Normal range of motion. Neurological: He is alert. Nursing note and vitals reviewed. No visits with results within 3 Month(s) from this visit.    Latest known visit with results is:   Admission on 07/16/2018, Discharged on 07/16/2018   Component Date Value Ref Range Status    Color 07/16/2018 YELLOW    Final    Appearance 07/16/2018 CLEAR    Final    Specific gravity 07/16/2018 1.008  1.005 - 1.030   Final    pH (UA) 07/16/2018 6.0  5.0 - 8.0   Final    Protein 07/16/2018 NEGATIVE   NEG mg/dL Final    Glucose 07/16/2018 NEGATIVE   NEG mg/dL Final    Ketone 07/16/2018 NEGATIVE   NEG mg/dL Final    Bilirubin 07/16/2018 NEGATIVE   NEG   Final    Blood 07/16/2018 NEGATIVE   NEG   Final    Urobilinogen 07/16/2018 0.2  0.2 - 1.0 EU/dL Final    Nitrites 07/16/2018 NEGATIVE   NEG   Final    Leukocyte Esterase 07/16/2018 NEGATIVE   NEG   Final    WBC 07/16/2018 NEGATIVE   0 - 4 /hpf Final    RBC 07/16/2018 NEGATIVE   0 - 5 /hpf Final    Epithelial cells 07/16/2018 FEW  0 - 5 /lpf Final    Bacteria 07/16/2018 NEGATIVE   NEG /hpf Final    BENZODIAZEPINES 07/16/2018 NEGATIVE   NEG   Final    BARBITURATES 07/16/2018 NEGATIVE   NEG   Final    THC (TH-CANNABINOL) 07/16/2018 NEGATIVE   NEG   Final    OPIATES 07/16/2018 NEGATIVE   NEG   Final    PCP(PHENCYCLIDINE) 07/16/2018 NEGATIVE   NEG   Final    COCAINE 07/16/2018 NEGATIVE   NEG   Final    AMPHETAMINES 07/16/2018 NEGATIVE   NEG   Final    METHADONE 07/16/2018 NEGATIVE   NEG   Final    HDSCOM 07/16/2018 (NOTE)   Final    Comment: Specimen analysis was performed without chain of custody handling. These results should be used for medical purposes only and not for   legal or employment purposes. Unconfirmed screening results must not   be used for non-medical purposes. The cut-off concentration for positive results are as follows:    AMPH     1000 ng/mL  SANDRA      200 ng/mL  NIGEL      200 ng/mL  VIKI       300 ng/mL  METH      300 ng/mL  OPI       300 ng/mL  PCP        25 ng/mL  THC        50 ng/mL           . No results found for any visits on 03/28/19. Assessment / Plan:      ICD-10-CM ICD-9-CM    1. Essential hypertension I10 401.9 CBC WITH AUTOMATED DIFF      METABOLIC PANEL, COMPREHENSIVE      amLODIPine (NORVASC) 10 mg tablet      isosorbide mononitrate ER (IMDUR) 30 mg tablet      metoprolol succinate (TOPROL-XL) 200 mg XL tablet      pravastatin (PRAVACHOL) 80 mg tablet      spironolactone (ALDACTONE) 25 mg tablet   2. Tobacco use disorder F17.200 305.1    3. Alcohol abuse F10.10 305.00    4. Mixed hyperlipidemia E78.2 272.2 LIPID PANEL   5. Screening for thyroid disorder Z13.29 V77.0 TSH 3RD GENERATION   6. Encounter for vitamin deficiency screening Z13.21 V77.99 VITAMIN D, 25 HYDROXY   7. Prostate cancer screening Z12.5 V76.44 PSA SCREENING (SCREENING)     Hypertension-patient blood pressure is elevated today at 160/78. Do not believe patient is taking his medication regularly. No change to current treatment plan today. Patient advised of the importance of taking his medication daily and notes that he will comply.   Refill amlodipine and doing Toprol x30 days  Tobacco abuse-patient is not quite ready to quit yet  He admits to continue to smoke and drink  Thyroid, vitamin D and PSA screening  Follow-up in 1 month for complete physical and labs     Follow-up and Dispositions    · Return in about 3 months (around 6/28/2019), or if symptoms worsen or fail to improve. I asked the patient if he  had any questions and answered his  questions.   The patient stated that he understands the treatment plan and agrees with the treatment plan

## 2019-04-25 ENCOUNTER — HOSPITAL ENCOUNTER (OUTPATIENT)
Dept: LAB | Age: 69
Discharge: HOME | End: 2019-04-25
Payer: MEDICARE

## 2019-04-25 DIAGNOSIS — Z13.29 SCREENING FOR THYROID DISORDER: ICD-10-CM

## 2019-04-25 DIAGNOSIS — Z13.21 ENCOUNTER FOR VITAMIN DEFICIENCY SCREENING: ICD-10-CM

## 2019-04-25 DIAGNOSIS — E78.2 MIXED HYPERLIPIDEMIA: ICD-10-CM

## 2019-04-25 DIAGNOSIS — Z12.5 PROSTATE CANCER SCREENING: ICD-10-CM

## 2019-04-25 DIAGNOSIS — I10 ESSENTIAL HYPERTENSION: ICD-10-CM

## 2019-04-25 LAB
ALBUMIN SERPL-MCNC: 4.1 G/DL (ref 3.4–5)
ALBUMIN/GLOB SERPL: 1.1 {RATIO} (ref 0.8–1.7)
ALP SERPL-CCNC: 75 U/L (ref 45–117)
ALT SERPL-CCNC: 13 U/L (ref 16–61)
ANION GAP SERPL CALC-SCNC: 7 MMOL/L (ref 3–18)
AST SERPL-CCNC: 24 U/L (ref 15–37)
BASOPHILS # BLD: 0 K/UL (ref 0–0.1)
BASOPHILS NFR BLD: 1 % (ref 0–2)
BILIRUB SERPL-MCNC: 0.4 MG/DL (ref 0.2–1)
BUN SERPL-MCNC: 7 MG/DL (ref 7–18)
BUN/CREAT SERPL: 8 (ref 12–20)
CALCIUM SERPL-MCNC: 9 MG/DL (ref 8.5–10.1)
CHLORIDE SERPL-SCNC: 104 MMOL/L (ref 100–108)
CHOLEST SERPL-MCNC: 163 MG/DL
CO2 SERPL-SCNC: 27 MMOL/L (ref 21–32)
CREAT SERPL-MCNC: 0.85 MG/DL (ref 0.6–1.3)
DIFFERENTIAL METHOD BLD: ABNORMAL
EOSINOPHIL # BLD: 0.1 K/UL (ref 0–0.4)
EOSINOPHIL NFR BLD: 1 % (ref 0–5)
ERYTHROCYTE [DISTWIDTH] IN BLOOD BY AUTOMATED COUNT: 17.7 % (ref 11.6–14.5)
GLOBULIN SER CALC-MCNC: 3.6 G/DL (ref 2–4)
GLUCOSE SERPL-MCNC: 96 MG/DL (ref 74–99)
HCT VFR BLD AUTO: 35.3 % (ref 36–48)
HDLC SERPL-MCNC: 42 MG/DL (ref 40–60)
HDLC SERPL: 3.9 {RATIO} (ref 0–5)
HGB BLD-MCNC: 11.3 G/DL (ref 13–16)
LDLC SERPL CALC-MCNC: 102.6 MG/DL (ref 0–100)
LIPID PROFILE,FLP: ABNORMAL
LYMPHOCYTES # BLD: 1.3 K/UL (ref 0.9–3.6)
LYMPHOCYTES NFR BLD: 22 % (ref 21–52)
MCH RBC QN AUTO: 31.7 PG (ref 24–34)
MCHC RBC AUTO-ENTMCNC: 32 G/DL (ref 31–37)
MCV RBC AUTO: 99.2 FL (ref 74–97)
MONOCYTES # BLD: 0.6 K/UL (ref 0.05–1.2)
MONOCYTES NFR BLD: 10 % (ref 3–10)
NEUTS SEG # BLD: 4 K/UL (ref 1.8–8)
NEUTS SEG NFR BLD: 66 % (ref 40–73)
PLATELET # BLD AUTO: 196 K/UL (ref 135–420)
PMV BLD AUTO: 12 FL (ref 9.2–11.8)
POTASSIUM SERPL-SCNC: 3.1 MMOL/L (ref 3.5–5.5)
PROT SERPL-MCNC: 7.7 G/DL (ref 6.4–8.2)
PSA SERPL-MCNC: 0.5 NG/ML (ref 0–4)
RBC # BLD AUTO: 3.56 M/UL (ref 4.7–5.5)
SODIUM SERPL-SCNC: 138 MMOL/L (ref 136–145)
TRIGL SERPL-MCNC: 92 MG/DL (ref ?–150)
TSH SERPL DL<=0.05 MIU/L-ACNC: 0.98 UIU/ML (ref 0.36–3.74)
VLDLC SERPL CALC-MCNC: 18.4 MG/DL
WBC # BLD AUTO: 6 K/UL (ref 4.6–13.2)

## 2019-04-25 PROCEDURE — 85025 COMPLETE CBC W/AUTO DIFF WBC: CPT

## 2019-04-25 PROCEDURE — 80061 LIPID PANEL: CPT

## 2019-04-25 PROCEDURE — 82306 VITAMIN D 25 HYDROXY: CPT

## 2019-04-25 PROCEDURE — 84443 ASSAY THYROID STIM HORMONE: CPT

## 2019-04-25 PROCEDURE — 84153 ASSAY OF PSA TOTAL: CPT

## 2019-04-25 PROCEDURE — 80053 COMPREHEN METABOLIC PANEL: CPT

## 2019-04-25 PROCEDURE — 36415 COLL VENOUS BLD VENIPUNCTURE: CPT

## 2019-04-26 LAB — 25(OH)D3 SERPL-MCNC: 11 NG/ML (ref 30–100)

## 2019-04-28 DIAGNOSIS — I10 ESSENTIAL HYPERTENSION: ICD-10-CM

## 2019-04-30 ENCOUNTER — OFFICE VISIT (OUTPATIENT)
Dept: CARDIOLOGY CLINIC | Age: 69
End: 2019-04-30

## 2019-04-30 VITALS
WEIGHT: 192 LBS | HEIGHT: 71 IN | SYSTOLIC BLOOD PRESSURE: 142 MMHG | DIASTOLIC BLOOD PRESSURE: 78 MMHG | BODY MASS INDEX: 26.88 KG/M2 | HEART RATE: 62 BPM

## 2019-04-30 DIAGNOSIS — I25.119 CORONARY ARTERY DISEASE INVOLVING NATIVE CORONARY ARTERY OF NATIVE HEART WITH ANGINA PECTORIS (HCC): Primary | ICD-10-CM

## 2019-04-30 RX ORDER — SPIRONOLACTONE 25 MG/1
TABLET ORAL
Qty: 60 TAB | Refills: 0 | Status: SHIPPED | OUTPATIENT
Start: 2019-04-30 | End: 2019-05-24 | Stop reason: SDUPTHER

## 2019-04-30 RX ORDER — PRAVASTATIN SODIUM 80 MG/1
TABLET ORAL
Qty: 30 TAB | Refills: 0 | Status: SHIPPED | OUTPATIENT
Start: 2019-04-30 | End: 2019-05-24 | Stop reason: SDUPTHER

## 2019-04-30 NOTE — PROGRESS NOTES
HISTORY OF PRESENT ILLNESS  Chrystal Higgins is a 71 y.o. male. ASSESSMENT and PLAN    Mr. Toby Cooper has history of small vessel disease in his coronary circulation. He has done well on medical therapy. Unfortunately, he still smokes cigarettes about one pack per day. He also drinks beer, 2-4 beers daily. Because of erectile dysfunction, he had taken Cialis in the past. He was able to come off long-acting nitrates by cutting back on his alcohol intake. · CAD:    He has history of small vessel disease but remains clinically stable. · BP:    Controlled currently on current medication regimen. · HR:    Stable. · CHF:    Currently, there is no evidence of decompensated CHF noted. · Weight:    His weight today is 192 pounds which is unchanged from 2 years ago. · Cholesterol:   Target LDL <70. Lovastatin 80 daily; niacin 500 twice daily. · Tobacco: Again, tobacco cessation was emphasized. · Anti-platelet:   Remains on ASA. Again, a lengthy discussion was carried on about the importance of medication compliance, follow-up compliance, and lifestyle changes. Also, tobacco abstinence and alcohol in moderation was reemphasized. I will see him back annually. Thank you. Encounter Diagnoses   Name Primary?  Coronary artery disease involving native coronary artery of native heart with angina pectoris ([facility] [address] Yes     current treatment plan is effective, no change in therapy  lab results and schedule of future lab studies reviewed with patient  reviewed diet, exercise and weight control  very strongly urged to quit smoking to reduce cardiovascular risk  cardiovascular risk and specific lipid/LDL goals reviewed  use of aspirin to prevent MI and TIA's discussed  Addressed at length about alcohol in moderation    HPI  Today, Mr. de la cruz has no complaints of chest pains, increased shortness of breath or decreased exercise capacity. He denies any orthopnea or PND.   He denies any palpitations or dizziness. Unfortunately, he drinks about 4-6 shots of yasemin daily. I encouraged him in the past as well as again today to drink in moderation no more than 1 drink per day. Unfortunately, he also smokes about a pack a day. Again, this was readdressed. Review of Systems   Constitutional: Positive for malaise/fatigue. Respiratory: Negative for shortness of breath. Cardiovascular: Negative for chest pain, palpitations, orthopnea, claudication, leg swelling and PND. Neurological: Positive for weakness. All other systems reviewed and are negative. Physical Exam   Constitutional: He is oriented to person, place, and time. He appears well-developed and well-nourished. HENT:   Head: Normocephalic. Eyes: Conjunctivae are normal.   Neck: Neck supple. No JVD present. Carotid bruit is not present. No thyromegaly present. Cardiovascular: Normal rate and regular rhythm. Pulmonary/Chest: Breath sounds normal.   Abdominal: Bowel sounds are normal.   Musculoskeletal: He exhibits no edema. Neurological: He is alert and oriented to person, place, and time. Skin: Skin is warm and dry. Nursing note and vitals reviewed. PCP: Jessica Morris NP    Past Medical History:   Diagnosis Date    CAD (coronary artery disease)     Cardiac catheterization 03/23/2001    RCA patent. m/dRPDA 55%. LM patent. LAD patent. CX patent. oOMB 75%. LVEDP 8-10 mmHg. EF 50%. Global hypk. Patent bilateral renal arteries.  Cardiac echocardiogram 10/24/2012    EF 55-60%. No WMA. Mild conc LVH. Gr 1 DDfx. RVSP 20-25 mmHg. Mild LAE. Mild ADAM. Possible sm pericardial effusion but likely fat pad.  Cardiac nuclear imaging test, low risk 10/24/2012    No ischemia or prior infarction. EF 58%. Normal EKG on max EST. Ex time 6 min. Low risk.    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       Current Outpatient Medications   Medication Sig Dispense Refill    amLODIPine (NORVASC) 10 mg tablet TAKE 1 TABLET BY MOUTH ONCE DAILY FOR HIGH BLOOD PRESSURE 30 Tab 0    isosorbide mononitrate ER (IMDUR) 30 mg tablet TAKE 1 TABLET BY MOUTH ONCE DAILY 30 Tab 0    metoprolol succinate (TOPROL-XL) 200 mg XL tablet TAKE 1 TABLET BY MOUTH ONCE DAILY 30 Tab 0    pravastatin (PRAVACHOL) 80 mg tablet Take 1 Tab by mouth daily. 30 Tab 0    omega-3 fatty acids-vitamin e (FISH OIL) 1,000 mg Cap Take 1 capsule by mouth as needed.  aspirin (ASPIRIN) 325 mg tablet Take 325 mg by mouth daily.  spironolactone (ALDACTONE) 25 mg tablet 1 bid 60 Tab 0       The patient has a family history of    Social History     Tobacco Use    Smoking status: Current Every Day Smoker     Packs/day: 2.50    Smokeless tobacco: Never Used   Substance Use Topics    Alcohol use: Yes     Comment: 10 beers a day    Drug use: No       Lab Results   Component Value Date/Time    Cholesterol, total 163 04/25/2019 10:58 AM    HDL Cholesterol 42 04/25/2019 10:58 AM    LDL, calculated 102.6 (H) 04/25/2019 10:58 AM    Triglyceride 92 04/25/2019 10:58 AM    CHOL/HDL Ratio 3.9 04/25/2019 10:58 AM        BP Readings from Last 3 Encounters:   04/30/19 142/78   03/28/19 160/78   07/16/18 172/79        Pulse Readings from Last 3 Encounters:   04/30/19 62   03/28/19 97   07/16/18 89       Wt Readings from Last 3 Encounters:   04/30/19 87.1 kg (192 lb)   03/28/19 91.6 kg (202 lb)   07/16/18 83.9 kg (185 lb)         EKG: unchanged from previous tracings, normal sinus rhythm, LVH by voltage criteria.

## 2019-05-01 ENCOUNTER — TELEPHONE (OUTPATIENT)
Dept: FAMILY MEDICINE CLINIC | Facility: CLINIC | Age: 69
End: 2019-05-01

## 2019-05-01 DIAGNOSIS — E87.6 LOW SERUM POTASSIUM: Primary | ICD-10-CM

## 2019-05-01 DIAGNOSIS — E55.9 VITAMIN D DEFICIENCY: Primary | ICD-10-CM

## 2019-05-01 DIAGNOSIS — E87.6 HYPOKALEMIA: ICD-10-CM

## 2019-05-01 RX ORDER — POTASSIUM CHLORIDE 20 MEQ/1
20 TABLET, EXTENDED RELEASE ORAL DAILY
Qty: 3 TAB | Refills: 0 | Status: SHIPPED | OUTPATIENT
Start: 2019-05-01 | End: 2019-05-04

## 2019-05-01 RX ORDER — ERGOCALCIFEROL 1.25 MG/1
50000 CAPSULE ORAL
Qty: 12 CAP | Refills: 1 | Status: SHIPPED | OUTPATIENT
Start: 2019-05-01 | End: 2019-09-13 | Stop reason: SDUPTHER

## 2019-05-01 NOTE — TELEPHONE ENCOUNTER
----- Message from Yvonne Don NP sent at 5/1/2019 12:05 AM EDT -----  Please let the patient know his vitamin D is low. Patient needs ergocalciferol 50,000 units every 7 days. Also patient is to monitor his foods high in saturated fat and limit his fried foods. Patient potassium is mildly low. Potassium chloride 20 mEq x 3 days. Patient needs to repeat his potassium this week.

## 2019-05-01 NOTE — TELEPHONE ENCOUNTER
Contacted Isaias Ocasio and informed Him of lab results per Morris Warner NP's request. Ger Peterson expressed understanding. Verbal order for repeat potasium level in 1 week, order read back and confirmed.

## 2019-05-01 NOTE — PROGRESS NOTES
Please let the patient know his vitamin D is low. Patient needs ergocalciferol 50,000 units every 7 days. Also patient is to monitor his foods high in saturated fat and limit his fried foods. Patient potassium is mildly low. Potassium chloride 20 mEq x 3 days. Patient needs to repeat his potassium this week.

## 2019-05-13 ENCOUNTER — HOSPITAL ENCOUNTER (OUTPATIENT)
Dept: LAB | Age: 69
Discharge: HOME | End: 2019-05-13
Payer: MEDICARE

## 2019-05-13 ENCOUNTER — OFFICE VISIT (OUTPATIENT)
Dept: FAMILY MEDICINE CLINIC | Facility: CLINIC | Age: 69
End: 2019-05-13

## 2019-05-13 VITALS
RESPIRATION RATE: 16 BRPM | BODY MASS INDEX: 27.44 KG/M2 | HEART RATE: 90 BPM | TEMPERATURE: 97 F | DIASTOLIC BLOOD PRESSURE: 74 MMHG | WEIGHT: 196 LBS | SYSTOLIC BLOOD PRESSURE: 136 MMHG | HEIGHT: 71 IN | OXYGEN SATURATION: 97 %

## 2019-05-13 DIAGNOSIS — I10 ESSENTIAL HYPERTENSION: ICD-10-CM

## 2019-05-13 DIAGNOSIS — E87.6 LOW SERUM POTASSIUM: ICD-10-CM

## 2019-05-13 DIAGNOSIS — Z11.59 ENCOUNTER FOR HEPATITIS C SCREENING TEST FOR LOW RISK PATIENT: ICD-10-CM

## 2019-05-13 DIAGNOSIS — Z13.5 SCREENING FOR GLAUCOMA: Primary | ICD-10-CM

## 2019-05-13 LAB — POTASSIUM SERPL-SCNC: 3.8 MMOL/L (ref 3.5–5.5)

## 2019-05-13 PROCEDURE — 36415 COLL VENOUS BLD VENIPUNCTURE: CPT

## 2019-05-13 PROCEDURE — 84132 ASSAY OF SERUM POTASSIUM: CPT

## 2019-05-13 PROCEDURE — 86803 HEPATITIS C AB TEST: CPT

## 2019-05-13 NOTE — PROGRESS NOTES
Sukhjinder Guzman is a 71 y.o. male presenting today for Annual Wellness Visit Richy Miranda HPI:  Sukhjinder Guzman presents to the office today for follow-up on hypertension. Patient reports he is compliant with taking all medications. Blood pressure at last office visit on 3/28/2019 was elevated at 160/78 and today /74. Potassium level on 4/25/2019 was 3.1. Patient reports he completed his 3 day-potassium chloride supplements and continues to take vitamin D 50,000 units weekly. Office eye exam showed right eye vision 20/70, left eye 20/25, and both eyes 20/30. Patient reports he sees his ophthalmologist every 6 months. Patient denies any complaints or concerns today. He denies vision changes, headaches, dizziness, lightheadedness, chest pain, palpitations, or redness of breath. Review of Systems Constitutional: Negative for chills and fever. HENT: Negative for congestion and hearing loss. Eyes: Negative for blurred vision. Respiratory: Negative for shortness of breath. Cardiovascular: Negative for chest pain and palpitations. Gastrointestinal: Negative for abdominal pain, diarrhea, nausea and vomiting. Neurological: Negative for dizziness. Allergies Allergen Reactions  Lisinopril Anaphylaxis and Swelling Swelling of tongue, 
angioedema Current Outpatient Medications Medication Sig Dispense Refill  ergocalciferol (ERGOCALCIFEROL) 50,000 unit capsule Take 1 Cap by mouth every seven (7) days.  Indications: Vitamin D Deficiency (High Dose Therapy) 12 Cap 1  
 pravastatin (PRAVACHOL) 80 mg tablet TAKE 1 TABLET BY MOUTH EVERY DAY 30 Tab 0  
 spironolactone (ALDACTONE) 25 mg tablet TAKE 1 TABLET BY MOUTH TWICE A DAY 60 Tab 0  
 amLODIPine (NORVASC) 10 mg tablet TAKE 1 TABLET BY MOUTH ONCE DAILY FOR HIGH BLOOD PRESSURE 30 Tab 0  
 isosorbide mononitrate ER (IMDUR) 30 mg tablet TAKE 1 TABLET BY MOUTH ONCE DAILY 30 Tab 0  
  metoprolol succinate (TOPROL-XL) 200 mg XL tablet TAKE 1 TABLET BY MOUTH ONCE DAILY 30 Tab 0  
 omega-3 fatty acids-vitamin e (FISH OIL) 1,000 mg Cap Take 1 capsule by mouth as needed.  aspirin (ASPIRIN) 325 mg tablet Take 325 mg by mouth daily. Past Medical History:  
Diagnosis Date  CAD (coronary artery disease)  Cardiac catheterization 03/23/2001 RCA patent. m/dRPDA 55%. LM patent. LAD patent. CX patent. oOMB 75%. LVEDP 8-10 mmHg. EF 50%. Global hypk. Patent bilateral renal arteries.  Cardiac echocardiogram 10/24/2012 EF 55-60%. No WMA. Mild conc LVH. Gr 1 DDfx. RVSP 20-25 mmHg. Mild LAE. Mild ADAM. Possible sm pericardial effusion but likely fat pad.  Cardiac nuclear imaging test, low risk 10/24/2012 No ischemia or prior infarction. EF 58%. Normal EKG on max EST. Ex time 6 min. Low risk.  History of alcoholism ([facility] [address] Personal history  Hypercholesteremia  Hypertension  Hypertrophy (benign) of prostate Without urinary obstruction and other lower urinary tract symptoms (LUTS)  Mixed hyperlipidemia Past Surgical History:  
Procedure Laterality Date  HX COLONOSCOPY  10/2015  
 neg; h/o polyps  HX HEART CATHETERIZATION  03/23/2001  HX OTHER SURGICAL    
 sgy to correct club foot Social History Socioeconomic History  Marital status: SINGLE Spouse name: Not on file  Number of children: Not on file  Years of education: Not on file  Highest education level: Not on file Occupational History  Not on file Social Needs  Financial resource strain: Not on file  Food insecurity:  
  Worry: Not on file Inability: Not on file  Transportation needs:  
  Medical: Not on file Non-medical: Not on file Tobacco Use  Smoking status: Current Every Day Smoker Packs/day: 2.50  Smokeless tobacco: Never Used Substance and Sexual Activity  Alcohol use:  Yes  
 Comment: 10 beers a day  Drug use: No  
 Sexual activity: Not Currently Partners: Female Lifestyle  Physical activity:  
  Days per week: Not on file Minutes per session: Not on file  Stress: Not on file Relationships  Social connections:  
  Talks on phone: Not on file Gets together: Not on file Attends Protestant service: Not on file Active member of club or organization: Not on file Attends meetings of clubs or organizations: Not on file Relationship status: Not on file  Intimate partner violence:  
  Fear of current or ex partner: Not on file Emotionally abused: Not on file Physically abused: Not on file Forced sexual activity: Not on file Other Topics Concern  Not on file Social History Narrative  Not on file Patient does not have an advanced directive on file Vitals:  
 05/13/19 1253 BP: 136/74 Pulse: 90 Resp: 16 Temp: 97 °F (36.1 °C) TempSrc: Tympanic SpO2: 97% Weight: 196 lb (88.9 kg) Height: 5' 11\" (1.803 m) PainSc:   0 - No pain Physical Exam  
Constitutional: He is oriented to person, place, and time and well-developed, well-nourished, and in no distress. HENT:  
Head: Normocephalic. Right Ear: Hearing normal.  
Left Ear: Hearing normal.  
Mouth/Throat: Mucous membranes are normal.  
BL cerumen impaction Neck: Normal range of motion. Pulmonary/Chest: Effort normal and breath sounds normal.  
Abdominal: Soft. Bowel sounds are normal.  
Musculoskeletal: He exhibits edema ( 1+ BL lower legs and ankles ). Neurological: He is alert and oriented to person, place, and time. Skin: Skin is warm and dry. Dry, flaky skin note to BL lower extremities. Nursing note and vitals reviewed. Hospital Outpatient Visit on 05/13/2019 Component Date Value Ref Range Status  Potassium 05/13/2019 3.8  3.5 - 5.5 mmol/L Final  
Hospital Outpatient Visit on 04/25/2019 Component Date Value Ref Range Status  WBC 04/25/2019 6.0  4.6 - 13.2 K/uL Final  
 RBC 04/25/2019 3.56* 4.70 - 5.50 M/uL Final  
 HGB 04/25/2019 11.3* 13.0 - 16.0 g/dL Final  
 HCT 04/25/2019 35.3* 36.0 - 48.0 % Final  
 MCV 04/25/2019 99.2* 74.0 - 97.0 FL Final  
 MCH 04/25/2019 31.7  24.0 - 34.0 PG Final  
 MCHC 04/25/2019 32.0  31.0 - 37.0 g/dL Final  
 RDW 04/25/2019 17.7* 11.6 - 14.5 % Final  
 PLATELET 10/70/9007 480  135 - 420 K/uL Final  
 MPV 04/25/2019 12.0* 9.2 - 11.8 FL Final  
 NEUTROPHILS 04/25/2019 66  40 - 73 % Final  
 LYMPHOCYTES 04/25/2019 22  21 - 52 % Final  
 MONOCYTES 04/25/2019 10  3 - 10 % Final  
 EOSINOPHILS 04/25/2019 1  0 - 5 % Final  
 BASOPHILS 04/25/2019 1  0 - 2 % Final  
 ABS. NEUTROPHILS 04/25/2019 4.0  1.8 - 8.0 K/UL Final  
 ABS. LYMPHOCYTES 04/25/2019 1.3  0.9 - 3.6 K/UL Final  
 ABS. MONOCYTES 04/25/2019 0.6  0.05 - 1.2 K/UL Final  
 ABS. EOSINOPHILS 04/25/2019 0.1  0.0 - 0.4 K/UL Final  
 ABS. BASOPHILS 04/25/2019 0.0  0.0 - 0.1 K/UL Final  
 DF 04/25/2019 AUTOMATED    Final  
 LIPID PROFILE 04/25/2019        Final  
 Cholesterol, total 04/25/2019 163  <200 MG/DL Final  
 Triglyceride 04/25/2019 92  <150 MG/DL Final  
 Comment: The drugs N-acetylcysteine (NAC) and 
Metamiszole have been found to cause falsely 
low results in this chemical assay. Please 
be sure to submit blood samples obtained BEFORE administration of either of these 
drugs to assure correct results.  
  
 HDL Cholesterol 04/25/2019 42  40 - 60 MG/DL Final  
 LDL, calculated 04/25/2019 102.6* 0 - 100 MG/DL Final  
 VLDL, calculated 04/25/2019 18.4  MG/DL Final  
 CHOL/HDL Ratio 04/25/2019 3.9  0 - 5.0   Final  
 Sodium 04/25/2019 138  136 - 145 mmol/L Final  
 Potassium 04/25/2019 3.1* 3.5 - 5.5 mmol/L Final  
 Chloride 04/25/2019 104  100 - 108 mmol/L Final  
 CO2 04/25/2019 27  21 - 32 mmol/L Final  
 Anion gap 04/25/2019 7  3.0 - 18 mmol/L Final  
  Glucose 04/25/2019 96  74 - 99 mg/dL Final  
 BUN 04/25/2019 7  7.0 - 18 MG/DL Final  
 Creatinine 04/25/2019 0.85  0.6 - 1.3 MG/DL Final  
 BUN/Creatinine ratio 04/25/2019 8* 12 - 20   Final  
 GFR est AA 04/25/2019 >60  >60 ml/min/1.73m2 Final  
 GFR est non-AA 04/25/2019 >60  >60 ml/min/1.73m2 Final  
 Comment: (NOTE) Estimated GFR is calculated using the Modification of Diet in Renal  
Disease (MDRD) Study equation, reported for both  Americans [facility] [place]) and non- Americans (GFRNA), and normalized to 1.73m2  
body surface area. The physician must decide which value applies to  
the patient. The MDRD study equation should only be used in  
individuals age 25 or older. It has not been validated for the  
following: pregnant women, patients with serious comorbid conditions,  
or on certain medications, or persons with extremes of body size,  
muscle mass, or nutritional status.  Calcium 04/25/2019 9.0  8.5 - 10.1 MG/DL Final  
 Bilirubin, total 04/25/2019 0.4  0.2 - 1.0 MG/DL Final  
 ALT (SGPT) 04/25/2019 13* 16 - 61 U/L Final  
 AST (SGOT) 04/25/2019 24  15 - 37 U/L Final  
 Alk. phosphatase 04/25/2019 75  45 - 117 U/L Final  
 Protein, total 04/25/2019 7.7  6.4 - 8.2 g/dL Final  
 Albumin 04/25/2019 4.1  3.4 - 5.0 g/dL Final  
 Globulin 04/25/2019 3.6  2.0 - 4.0 g/dL Final  
 A-G Ratio 04/25/2019 1.1  0.8 - 1.7   Final  
 TSH 04/25/2019 0.98  0.36 - 3.74 uIU/mL Final  
 Vitamin D 25-Hydroxy 04/25/2019 11.0* 30 - 100 ng/mL Final  
 Comment: (NOTE) Deficiency               <20 ng/mL Insufficiency          20-30 ng/mL Sufficient             ng/mL Possible toxicity       >100 ng/mL The Method used is [facility] currently standardized to a Center of Disease Control and Prevention (CDC) certified reference  
method. Samples containing fluorescein dye can produce falsely  
elevated values when tested with the [employer] Centaur Vitamin D Assay. It is recommended that results in the toxic range, >100 ng/mL, be  
retested 72 hours post fluorescein exposure.  Prostate Specific Ag 04/25/2019 0.5  0.0 - 4.0 ng/mL Final  
 
 
. Results for orders placed or performed during the hospital encounter of 05/13/19 POTASSIUM Result Value Ref Range Potassium 3.8 3.5 - 5.5 mmol/L Assessment / Plan: ICD-10-CM ICD-9-CM 1. Screening for glaucoma Z13.5 V80.1 CANCELED: REFERRAL TO OPHTHALMOLOGY 2. Encounter for hepatitis C screening test for low risk patient Z11.59 V73.89 HEPATITIS C AB 3. Essential hypertension I10 401.9 4. Low serum potassium E87.6 276.8 Follow-up and Dispositions · Return in about 3 months (around 8/13/2019). Hypertension-continue spironolactone, amlodipine, isosorbide mononitrate, and metoprolol succinate. Labs-potassium level and hepatitis C panel. Urine urinalysis completed Return to clinic in 4 months Pneumococcal vaccine and left ear irrigation at next office visit Educated patient on low-salt diet, not adding salt to prepared food, and using Mrs. Rivero. Recommended Eucerin cream for dry flaky skin. I asked the patient if he  had any questions and answered his  questions. The patient stated that he understands the treatment plan and agrees with the treatment plan This document was created with a voice activated dictation system and may contain transcription errors. This is the Subsequent Medicare Annual Wellness Exam, performed 12 months or more after the Initial AWV or the last Subsequent AWV I have reviewed the patient's medical history in detail and updated the computerized patient record. History Past Medical History:  
Diagnosis Date  CAD (coronary artery disease)  Cardiac catheterization 03/23/2001 RCA patent. m/dRPDA 55%. LM patent. LAD patent. CX patent. oOMB 75%. LVEDP 8-10 mmHg. EF 50%. Global hypk. Patent bilateral renal arteries.  Cardiac echocardiogram 10/24/2012 EF 55-60%. No WMA. Mild conc LVH. Gr 1 DDfx. RVSP 20-25 mmHg. Mild LAE. Mild ADAM. Possible sm pericardial effusion but likely fat pad.  Cardiac nuclear imaging test, low risk 10/24/2012 No ischemia or prior infarction. EF 58%. Normal EKG on max EST. Ex time 6 min. Low risk.  History of alcoholism ([facility] [address] Personal history  Hypercholesteremia  Hypertension  Hypertrophy (benign) of prostate Without urinary obstruction and other lower urinary tract symptoms (LUTS)  Mixed hyperlipidemia Past Surgical History:  
Procedure Laterality Date  HX COLONOSCOPY  10/2015  
 neg; h/o polyps  HX HEART CATHETERIZATION  03/23/2001  HX OTHER SURGICAL    
 sgy to correct club foot Current Outpatient Medications Medication Sig Dispense Refill  ergocalciferol (ERGOCALCIFEROL) 50,000 unit capsule Take 1 Cap by mouth every seven (7) days. Indications: Vitamin D Deficiency (High Dose Therapy) 12 Cap 1  
 pravastatin (PRAVACHOL) 80 mg tablet TAKE 1 TABLET BY MOUTH EVERY DAY 30 Tab 0  
 spironolactone (ALDACTONE) 25 mg tablet TAKE 1 TABLET BY MOUTH TWICE A DAY 60 Tab 0  
 amLODIPine (NORVASC) 10 mg tablet TAKE 1 TABLET BY MOUTH ONCE DAILY FOR HIGH BLOOD PRESSURE 30 Tab 0  
 isosorbide mononitrate ER (IMDUR) 30 mg tablet TAKE 1 TABLET BY MOUTH ONCE DAILY 30 Tab 0  
 metoprolol succinate (TOPROL-XL) 200 mg XL tablet TAKE 1 TABLET BY MOUTH ONCE DAILY 30 Tab 0  
 omega-3 fatty acids-vitamin e (FISH OIL) 1,000 mg Cap Take 1 capsule by mouth as needed.  aspirin (ASPIRIN) 325 mg tablet Take 325 mg by mouth daily. Allergies Allergen Reactions  Lisinopril Anaphylaxis and Swelling Swelling of tongue, 
angioedema Family History Problem Relation Age of Onset  Heart Disease Mother  Heart Surgery Mother  Dementia Father Social History Tobacco Use  Smoking status: Current Every Day Smoker Packs/day: 2.50  Smokeless tobacco: Never Used Substance Use Topics  Alcohol use: Yes Comment: 10 beers a day Patient Active Problem List  
Diagnosis Code  Hypercholesteremia E78.00  Hypertension I10  
 CAD (coronary artery disease) I25.10  Tobacco use disorder F17.200  Alcohol abuse F10.10  History of alcoholism ([facility] [address] F10.21  
 Hypertrophy (benign) of prostate N40.0  Mixed hyperlipidemia E78.2  Advance directive discussed with patient Z70.80 Depression Risk Factor Screening:  
 
3 most recent PHQ Screens 3/28/2019 Little interest or pleasure in doing things Not at all Feeling down, depressed, irritable, or hopeless Not at all Total Score PHQ 2 0 Alcohol Risk Factor Screening: You do not drink alcohol or very rarely. You average more than 14 drinks a week. Functional Ability and Level of Safety:  
Hearing Loss Hearing is good. Activities of Daily Living The home contains: no safety equipment. Patient does total self care Fall Risk Fall Risk Assessment, last 12 mths 3/28/2019 Able to walk? Yes Fall in past 12 months? No  
 
 
Abuse Screen Patient is not abused Cognitive Screening Evaluation of Cognitive Function: 
Has your family/caregiver stated any concerns about your memory: no 
Normal 
 
Patient Care Team  
Patient Care Team: 
Keena Brito NP as PCP - General (Nurse Practitioner) Assessment/Plan Education and counseling provided: 
Are appropriate based on today's review and evaluation Pneumococcal Vaccine Screening for glaucoma Diagnoses and all orders for this visit: 1. Screening for glaucoma 2. Encounter for hepatitis C screening test for low risk patient 
-     HEPATITIS C AB; Future 3. Essential hypertension 4. Low serum potassium Health Maintenance Due Topic Date Due  
 Hepatitis C Screening  1950  DTaP/Tdap/Td series (1 - Tdap) 04/03/1971  Shingrix Vaccine Age 50> (1 of 2) 04/03/2000  GLAUCOMA SCREENING Q2Y  04/03/2015  Pneumococcal 65+ years (1 of 2 - PCV13) 04/03/2015  MEDICARE YEARLY EXAM  03/14/2018

## 2019-05-14 LAB
HCV AB SER IA-ACNC: 0.03 INDEX
HCV AB SERPL QL IA: NEGATIVE
HCV COMMENT,HCGAC: NORMAL

## 2019-05-24 DIAGNOSIS — I10 ESSENTIAL HYPERTENSION: ICD-10-CM

## 2019-05-24 RX ORDER — PRAVASTATIN SODIUM 80 MG/1
TABLET ORAL
Qty: 30 TAB | Refills: 0 | Status: SHIPPED | OUTPATIENT
Start: 2019-05-24 | End: 2019-06-24 | Stop reason: SDUPTHER

## 2019-05-24 RX ORDER — SPIRONOLACTONE 25 MG/1
TABLET ORAL
Qty: 60 TAB | Refills: 0 | Status: SHIPPED | OUTPATIENT
Start: 2019-05-24 | End: 2019-06-24 | Stop reason: SDUPTHER

## 2019-09-02 RX ORDER — ISOSORBIDE MONONITRATE 30 MG/1
TABLET, EXTENDED RELEASE ORAL
Qty: 30 TAB | Refills: 7 | Status: SHIPPED | OUTPATIENT
Start: 2019-09-02 | End: 2020-06-02 | Stop reason: SDUPTHER

## 2019-09-02 RX ORDER — AMLODIPINE BESYLATE 10 MG/1
TABLET ORAL
Qty: 30 TAB | Refills: 7 | Status: SHIPPED | OUTPATIENT
Start: 2019-09-02 | End: 2020-06-02 | Stop reason: SDUPTHER

## 2019-09-02 RX ORDER — METOPROLOL SUCCINATE 200 MG/1
TABLET, EXTENDED RELEASE ORAL
Qty: 30 TAB | Refills: 5 | Status: SHIPPED | OUTPATIENT
Start: 2019-09-02 | End: 2020-06-02 | Stop reason: SDUPTHER

## 2019-09-13 ENCOUNTER — OFFICE VISIT (OUTPATIENT)
Dept: FAMILY MEDICINE CLINIC | Facility: CLINIC | Age: 69
End: 2019-09-13

## 2019-09-13 VITALS
BODY MASS INDEX: 26.46 KG/M2 | OXYGEN SATURATION: 98 % | WEIGHT: 189 LBS | HEART RATE: 85 BPM | DIASTOLIC BLOOD PRESSURE: 84 MMHG | TEMPERATURE: 97.9 F | RESPIRATION RATE: 12 BRPM | HEIGHT: 71 IN | SYSTOLIC BLOOD PRESSURE: 149 MMHG

## 2019-09-13 DIAGNOSIS — I10 ESSENTIAL HYPERTENSION: Primary | ICD-10-CM

## 2019-09-13 DIAGNOSIS — F10.10 ALCOHOL ABUSE: ICD-10-CM

## 2019-09-13 DIAGNOSIS — F17.219 NICOTINE DEPENDENCE, CIGARETTES, W UNSP DISORDERS: ICD-10-CM

## 2019-09-13 DIAGNOSIS — E78.00 HYPERCHOLESTEREMIA: ICD-10-CM

## 2019-09-13 DIAGNOSIS — R09.89 BRUIT OF RIGHT CAROTID ARTERY: ICD-10-CM

## 2019-09-13 DIAGNOSIS — F17.200 TOBACCO USE DISORDER: ICD-10-CM

## 2019-09-13 DIAGNOSIS — E55.9 VITAMIN D DEFICIENCY: ICD-10-CM

## 2019-09-13 RX ORDER — ERGOCALCIFEROL 1.25 MG/1
50000 CAPSULE ORAL
Qty: 12 CAP | Refills: 1 | Status: SHIPPED | OUTPATIENT
Start: 2019-09-13 | End: 2020-09-01 | Stop reason: SDUPTHER

## 2019-09-13 NOTE — PROGRESS NOTES
Andrae Thomas is a 71 y.o. male presenting today for Hypertension (follow-up, patient stated he have not taken his medication this morning.)  . HPI:  Andrae Thomas presents to the office today for hypertension and hyperlipidemia follow-up care. He presents today noting he feels well. Patient notes he is not taking his blood pressure medications today. He reports he is compliant with taking daily and is negative for any chest pain, palpitation or lower extremity edema. Review of Systems   Constitutional: Negative for malaise/fatigue. Respiratory: Negative for cough, sputum production and shortness of breath. Cardiovascular: Negative for chest pain, palpitations and leg swelling. Gastrointestinal: Negative for abdominal pain, nausea and vomiting. Neurological: Negative for dizziness and headaches. Allergies   Allergen Reactions    Lisinopril Anaphylaxis and Swelling     Swelling of tongue,  angioedema       Current Outpatient Medications   Medication Sig Dispense Refill    ergocalciferol (ERGOCALCIFEROL) 50,000 unit capsule Take 1 Cap by mouth every seven (7) days. Indications: Vitamin D Deficiency (High Dose Therapy) 12 Cap 1    amLODIPine (NORVASC) 10 mg tablet TAKE 1 TABLET BY MOUTH ONCE DAILY FOR HIGH BLOOD PRESSURE 30 Tab 7    metoprolol succinate (TOPROL-XL) 200 mg XL tablet TAKE 1 TABLET BY MOUTH EVERY DAY 30 Tab 5    isosorbide mononitrate ER (IMDUR) 30 mg tablet TAKE 1 TABLET BY MOUTH EVERY DAY 30 Tab 7    spironolactone (ALDACTONE) 25 mg tablet TAKE 1 TABLET BY MOUTH TWICE A DAY 60 Tab 3    pravastatin (PRAVACHOL) 80 mg tablet TAKE 1 TABLET BY MOUTH EVERY DAY 30 Tab 3    aspirin (ASPIRIN) 325 mg tablet Take 325 mg by mouth daily.  omega-3 fatty acids-vitamin e (FISH OIL) 1,000 mg Cap Take 1 capsule by mouth as needed. Past Medical History:   Diagnosis Date    CAD (coronary artery disease)     Cardiac catheterization 03/23/2001    RCA patent. m/dRPDA 55%. LM patent. LAD patent. CX patent. oOMB 75%. LVEDP 8-10 mmHg. EF 50%. Global hypk. Patent bilateral renal arteries.  Cardiac echocardiogram 10/24/2012    EF 55-60%. No WMA. Mild conc LVH. Gr 1 DDfx. RVSP 20-25 mmHg. Mild LAE. Mild ADAM. Possible sm pericardial effusion but likely fat pad.  Cardiac nuclear imaging test, low risk 10/24/2012    No ischemia or prior infarction. EF 58%. Normal EKG on max EST. Ex time 6 min. Low risk.    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       Social History     Socioeconomic History    Marital status: SINGLE     Spouse name: Not on file    Number of children: Not on file    Years of education: Not on file    Highest education level: Not on file   Occupational History    Not on file   Social Needs    Financial resource strain: Not on file    Food insecurity:     Worry: Not on file     Inability: Not on file    Transportation needs:     Medical: Not on file     Non-medical: Not on file   Tobacco Use    Smoking status: Current Every Day Smoker     Packs/day: 2.50    Smokeless tobacco: Never Used   Substance and Sexual Activity    Alcohol use: Yes     Comment: 10 beers a day    Drug use: No    Sexual activity: Not Currently     Partners: Female   Lifestyle    Physical activity:     Days per week: Not on file     Minutes per session: Not on file    Stress: Not on file   Relationships    Social connections:     Talks on phone: Not on file     Gets together: Not on file     Attends Denominational service: Not on file     Active member of club or organization: Not on file Attends meetings of clubs or organizations: Not on file     Relationship status: Not on file    Intimate partner violence:     Fear of current or ex partner: Not on file     Emotionally abused: Not on file     Physically abused: Not on file     Forced sexual activity: Not on file   Other Topics Concern    Not on file   Social History Narrative    Not on file       Patient does not have an advanced directive on file    Vitals:    09/13/19 1047   BP: 149/84   Pulse: 85   Resp: 12   Temp: 97.9 °F (36.6 °C)   TempSrc: Oral   SpO2: 98%   Weight: 189 lb (85.7 kg)   Height: 5' 11\" (1.803 m)   PainSc:   0 - No pain       Physical Exam   Constitutional: He is oriented to person, place, and time. No distress. Cardiovascular: Normal rate and regular rhythm. Pulses:       Carotid pulses are on the right side with bruit. Pulmonary/Chest: Effort normal and breath sounds normal.   Abdominal: Soft. Bowel sounds are normal.   Musculoskeletal: He exhibits no edema. Lymphadenopathy:     He has no cervical adenopathy. Neurological: He is alert and oriented to person, place, and time. Skin: Skin is warm. Nursing note and vitals reviewed. No visits with results within 3 Month(s) from this visit. Latest known visit with results is:   Hospital Outpatient Visit on 05/13/2019   Component Date Value Ref Range Status    Potassium 05/13/2019 3.8  3.5 - 5.5 mmol/L Final    Hepatitis C virus Ab 05/13/2019 0.03  <0.80 Index Final    Hep C  virus Ab Interp. 05/13/2019 NEGATIVE   NEG   Final    Hep C  virus Ab comment 05/13/2019        Final    Comment: Index <0.80. ......................... Felicia Chars Negative  Index > or = to 0.80 and <1.00. .... Felicia Chars Felicia Chars Equivocal  Index >1.00. ......................... Felicia Chars Positive          For Equivocal or Positive results, confirmation with Hepatitis C RNA by PCR or bDNA is suggested. .No results found for any visits on 09/13/19. Assessment / Plan:      ICD-10-CM ICD-9-CM    1.  Essential hypertension W27 513.8 METABOLIC PANEL, COMPREHENSIVE      CBC WITH AUTOMATED DIFF   2. Vitamin D deficiency E55.9 268.9 ergocalciferol (ERGOCALCIFEROL) 50,000 unit capsule      VITAMIN D, 25 HYDROXY   3. Tobacco use disorder F17.200 305.1 CT LOW DOSE LUNG CANCER SCREENING   4. Hypercholesteremia E78.00 272.0 LIPID PANEL   5. Alcohol abuse F10.10 305.00    6. Bruit of right carotid artery R09.89 785.9 DUPLEX CAROTID BILATERAL   7. Nicotine dependence, cigarettes, w unsp disorders F17.219 292.9 CT LOW DOSE LUNG CANCER SCREENING   Low-dose CT scan ordered  Fasting labs ordered  Refill vitamin D prescription  Follow-up in 4 months      Follow-up and Dispositions    · Return in about 3 months (around 12/13/2019), or if symptoms worsen or fail to improve. I asked the patient if he  had any questions and answered his  questions. The patient stated that he understands the treatment plan and agrees with the treatment plan    This document was created with a voice activated dictation system and may contain transcription errors.

## 2019-09-13 NOTE — PROGRESS NOTES
Alyssa Aragon presents today for   Chief Complaint   Patient presents with    Hypertension     follow-up, patient stated he have not taken his medication this morning. Is someone accompanying this pt? no    Is the patient using any DME equipment during [address]? no    Depression Screening:  3 most recent PHQ Screens 9/13/2019   Little interest or pleasure in doing things Not at all   Feeling down, depressed, irritable, or hopeless Not at all   Total Score PHQ 2 0       Learning Assessment:  Learning Assessment 5/23/2017   PRIMARY LEARNER Patient   CO-LEARNER CAREGIVER -   PRIMARY LANGUAGE ENGLISH   LEARNER PREFERENCE PRIMARY DEMONSTRATION   ANSWERED BY Patient   RELATIONSHIP SELF       Abuse Screening:  Abuse Screening Questionnaire 6/1/2016   Do you ever feel afraid of your partner? N   Are you in a relationship with someone who physically or mentally threatens you? N   Is it safe for you to go home? Y       Fall Risk  Fall Risk Assessment, last 12 mths 9/13/2019   Able to walk? Yes   Fall in past 12 months? No       Health Maintenance reviewed and discussed and ordered per Provider. Health Maintenance Due   Topic Date Due    DTaP/Tdap/Td series (1 - Tdap) 04/03/1971    Shingrix Vaccine Age 50> (1 of 2) 04/03/2000    GLAUCOMA SCREENING Q2Y  04/03/2015    Pneumococcal 65+ years (1 of 2 - PCV13) 04/03/2015           Coordination of Care:  1. Have you been to the ER, urgent care clinic since your last visit? Hospitalized since your last visit? no    2. Have you seen or consulted any other health care providers outside of the [address] [place] [address] since your last visit? Include any pap smears or colon screening.  no

## 2019-09-24 ENCOUNTER — NURSE NAVIGATOR (OUTPATIENT)
Dept: OTHER | Age: 69
End: 2019-09-24

## 2019-09-24 NOTE — NURSE NAVIGATOR
Referring Provider: David Alvarez NP      Lung Cancer Risk Profile:   Age: 71  Gender: Male  Height: 71\"  Weight: 189#    Smoking History:  Smoking Status: current use  # years smokin  # years quit: 0  Packs/day: 1  Pack years: 47    Patient discussed smoking cessation with PCP: Yes, per patient report    Patient participated in shared decision making process with PCP: Unknown    Patient is currently experiencing symptoms: No, per patient report    If yes what symptoms:     Co-Morbidities:  CAD    Cancer History:      Additional Risk Factors:   Exposure to second hand smoke      Patient's smoking history discussed via phone. Patient meets LDCT lung cancer screening criteria.  Call transferred to central scheduling to schedule exam.      Itzel Davis, BSN, RN, [id number] [facility] Nurse Navigator

## 2019-09-27 ENCOUNTER — HOSPITAL ENCOUNTER (OUTPATIENT)
Dept: VASCULAR SURGERY | Age: 69
Discharge: HOME | End: 2019-09-27
Attending: NURSE PRACTITIONER
Payer: MEDICARE

## 2019-09-27 DIAGNOSIS — I10 ESSENTIAL HYPERTENSION: ICD-10-CM

## 2019-09-27 DIAGNOSIS — R09.89 BRUIT OF RIGHT CAROTID ARTERY: ICD-10-CM

## 2019-09-27 LAB
LEFT CCA DIST DIAS: 10.3 CM/S
LEFT CCA DIST SYS: 58.6 CM/S
LEFT CCA MID DIAS: 14.22 CM/S
LEFT CCA MID SYS: 58.57 CM/S
LEFT CCA PROX DIAS: 11.6 CM/S
LEFT CCA PROX SYS: 76.8 CM/S
LEFT ECA DIAS: 0 CM/S
LEFT ECA SYS: 48.1 CM/S
LEFT ICA DIST DIAS: 16.7 CM/S
LEFT ICA DIST SYS: 44.4 CM/S
LEFT ICA MID DIAS: 19.2 CM/S
LEFT ICA MID SYS: 55.6 CM/S
LEFT ICA PROX DIAS: 20.7 CM/S
LEFT ICA PROX SYS: 65.1 CM/S
LEFT ICA/CCA SYS: 1.11
LEFT VERTEBRAL DIAS: 20.73 CM/S
LEFT VERTEBRAL SYS: 50.7 CM/S
RIGHT CCA DIST DIAS: 16 CM/S
RIGHT CCA DIST SYS: 66.1 CM/S
RIGHT CCA MID DIAS: 18.16 CM/S
RIGHT CCA MID SYS: 63.21 CM/S
RIGHT CCA PROX DIAS: 11.1 CM/S
RIGHT CCA PROX SYS: 56.4 CM/S
RIGHT ECA DIAS: 0 CM/S
RIGHT ECA SYS: 60.7 CM/S
RIGHT ICA DIST DIAS: 18.6 CM/S
RIGHT ICA DIST SYS: 43.9 CM/S
RIGHT ICA MID DIAS: 16.7 CM/S
RIGHT ICA MID SYS: 41.3 CM/S
RIGHT ICA PROX DIAS: 6.3 CM/S
RIGHT ICA PROX SYS: 30.9 CM/S
RIGHT ICA/CCA SYS: 0.7
RIGHT VERTEBRAL DIAS: 10.22 CM/S
RIGHT VERTEBRAL SYS: 36.1 CM/S

## 2019-09-27 PROCEDURE — 93880 EXTRACRANIAL BILAT STUDY: CPT

## 2019-09-30 RX ORDER — PRAVASTATIN SODIUM 80 MG/1
TABLET ORAL
Qty: 30 TAB | Refills: 3 | Status: SHIPPED | OUTPATIENT
Start: 2019-09-30 | End: 2020-01-14

## 2019-09-30 RX ORDER — SPIRONOLACTONE 25 MG/1
TABLET ORAL
Qty: 60 TAB | Refills: 3 | Status: SHIPPED | OUTPATIENT
Start: 2019-09-30 | End: 2020-01-14

## 2019-10-09 ENCOUNTER — HOSPITAL ENCOUNTER (OUTPATIENT)
Dept: CT IMAGING | Age: 69
Discharge: HOME | End: 2019-10-09
Attending: NURSE PRACTITIONER
Payer: MEDICARE

## 2019-10-09 DIAGNOSIS — F17.219 NICOTINE DEPENDENCE, CIGARETTES, W UNSP DISORDERS: ICD-10-CM

## 2019-10-09 DIAGNOSIS — F17.200 TOBACCO USE DISORDER: ICD-10-CM

## 2019-10-09 PROCEDURE — G0297 LDCT FOR LUNG CA SCREEN: HCPCS

## 2019-12-12 ENCOUNTER — OFFICE VISIT (OUTPATIENT)
Dept: FAMILY MEDICINE CLINIC | Facility: CLINIC | Age: 69
End: 2019-12-12

## 2019-12-12 VITALS
BODY MASS INDEX: 27.86 KG/M2 | HEART RATE: 65 BPM | OXYGEN SATURATION: 100 % | SYSTOLIC BLOOD PRESSURE: 151 MMHG | TEMPERATURE: 97 F | WEIGHT: 199 LBS | DIASTOLIC BLOOD PRESSURE: 71 MMHG | RESPIRATION RATE: 12 BRPM | HEIGHT: 71 IN

## 2019-12-12 DIAGNOSIS — F17.200 TOBACCO USE DISORDER: ICD-10-CM

## 2019-12-12 DIAGNOSIS — I10 ESSENTIAL HYPERTENSION: Primary | ICD-10-CM

## 2019-12-12 DIAGNOSIS — E78.2 MIXED HYPERLIPIDEMIA: ICD-10-CM

## 2019-12-12 RX ORDER — CALCIUM CARBONATE 750 MG/1
1 TABLET, CHEWABLE ORAL 2 TIMES DAILY
Qty: 1 KIT | Refills: 0 | Status: SHIPPED | OUTPATIENT
Start: 2019-12-12 | End: 2020-08-27 | Stop reason: SDUPTHER

## 2019-12-12 NOTE — PROGRESS NOTES
Visit Vitals  /71   Pulse 65   Temp 97 °F (36.1 °C) (Oral)   Resp 12   Ht 5' 11\" (1.803 m)   Wt 199 lb (90.3 kg)   SpO2 100%   BMI 27.75 kg/m²     Florestine Pack presents today for   Chief Complaint   Patient presents with    Hypertension     follow-up/patient did not take his medication this morning.  Labs     patient did not get labs done       Is someone accompanying this pt? no    Is the patient using any DME equipment during [facility]? no    Depression Screening:  3 most recent PHQ Screens 12/12/2019   Little interest or pleasure in doing things Not at all   Feeling down, depressed, irritable, or hopeless Not at all   Total Score PHQ 2 0       Learning Assessment:  Learning Assessment 5/23/2017   PRIMARY LEARNER Patient   CO-LEARNER CAREGIVER -   PRIMARY LANGUAGE ENGLISH   LEARNER PREFERENCE PRIMARY DEMONSTRATION   ANSWERED BY Patient   RELATIONSHIP SELF       Abuse Screening:  Abuse Screening Questionnaire 6/1/2016   Do you ever feel afraid of your partner? N   Are you in a relationship with someone who physically or mentally threatens you? N   Is it safe for you to go home? Y       Fall Risk  Fall Risk Assessment, last 12 mths 12/12/2019   Able to walk? Yes   Fall in past 12 months? No       Health Maintenance reviewed and discussed and ordered per Provider. Health Maintenance Due   Topic Date Due    DTaP/Tdap/Td series (1 - Tdap) 04/03/1961    Shingrix Vaccine Age 50> (1 of 2) 04/03/2000    GLAUCOMA SCREENING Q2Y  04/03/2015    Pneumococcal 65+ years (1 of 1 - PPSV23) 04/03/2015           Coordination of Care:  1. Have you been to the ER, urgent care clinic since your last visit? Hospitalized since your last visit? no    2. Have you seen or consulted any other health care providers outside of the [address] [place] [address] since your last visit? Include any pap smears or colon screening.  no

## 2019-12-12 NOTE — PROGRESS NOTES
Serena Tesfaye is a 71 y.o. male presenting today for Hypertension (follow-up/patient did not take his medication this morning.) and Labs (patient did not get labs done)  . HPI:  Serena Tesfaye presents to the office today for hypertension hyperlipidemia follow-up care. He is followed by Dr. Eric Webster, cardiologist and was last seen in the practice on April 30, 2019. His blood pressure is elevated today at 151/71. He admits he did not take his medication this a.m. nor did he get his labs done. His last labs were done in March, 2019. He is negative for chest pain, palpation or lower extremity edema. Review of Systems   Respiratory: Negative for cough and sputum production. Cardiovascular: Negative for chest pain, palpitations and leg swelling. Neurological: Negative for dizziness and headaches. Allergies   Allergen Reactions    Lisinopril Anaphylaxis and Swelling     Swelling of tongue,  angioedema       Current Outpatient Medications   Medication Sig Dispense Refill    Blood Pressure Test Kit-Medium kit 1 Kit by Does Not Apply route two (2) times a day. 1 Kit 0    spironolactone (ALDACTONE) 25 mg tablet TAKE 1 TABLET BY MOUTH TWICE A DAY 60 Tab 3    pravastatin (PRAVACHOL) 80 mg tablet TAKE 1 TABLET BY MOUTH EVERY DAY 30 Tab 3    ergocalciferol (ERGOCALCIFEROL) 50,000 unit capsule Take 1 Cap by mouth every seven (7) days. Indications: Vitamin D Deficiency (High Dose Therapy) 12 Cap 1    amLODIPine (NORVASC) 10 mg tablet TAKE 1 TABLET BY MOUTH ONCE DAILY FOR HIGH BLOOD PRESSURE 30 Tab 7    metoprolol succinate (TOPROL-XL) 200 mg XL tablet TAKE 1 TABLET BY MOUTH EVERY DAY 30 Tab 5    isosorbide mononitrate ER (IMDUR) 30 mg tablet TAKE 1 TABLET BY MOUTH EVERY DAY 30 Tab 7    omega-3 fatty acids-vitamin e (FISH OIL) 1,000 mg Cap Take 1 capsule by mouth as needed.  aspirin (ASPIRIN) 325 mg tablet Take 325 mg by mouth daily.            Past Medical History:   Diagnosis Date    CAD (coronary artery disease)     Cardiac catheterization 03/23/2001    RCA patent. m/dRPDA 55%. LM patent. LAD patent. CX patent. oOMB 75%. LVEDP 8-10 mmHg. EF 50%. Global hypk. Patent bilateral renal arteries.  Cardiac echocardiogram 10/24/2012    EF 55-60%. No WMA. Mild conc LVH. Gr 1 DDfx. RVSP 20-25 mmHg. Mild LAE. Mild ADAM. Possible sm pericardial effusion but likely fat pad.  Cardiac nuclear imaging test, low risk 10/24/2012    No ischemia or prior infarction. EF 58%. Normal EKG on max EST. Ex time 6 min. Low risk.    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       Social History     Socioeconomic History    Marital status: SINGLE     Spouse name: Not on file    Number of children: Not on file    Years of education: Not on file    Highest education level: Not on file   Occupational History    Not on file   Social Needs    Financial resource strain: Not on file    Food insecurity:     Worry: Not on file     Inability: Not on file    Transportation needs:     Medical: Not on file     Non-medical: Not on file   Tobacco Use    Smoking status: Current Every Day Smoker     Packs/day: 2.50    Smokeless tobacco: Never Used   Substance and Sexual Activity    Alcohol use: Yes     Comment: 10 beers a day    Drug use: No    Sexual activity: Not Currently     Partners: Female   Lifestyle    Physical activity:     Days per week: Not on file     Minutes per session: Not on file    Stress: Not on file   Relationships    Social connections:     Talks on phone: Not on file     Gets together: Not on file Attends Alevism service: Not on file     Active member of club or organization: Not on file     Attends meetings of clubs or organizations: Not on file     Relationship status: Not on file    Intimate partner violence:     Fear of current or ex partner: Not on file     Emotionally abused: Not on file     Physically abused: Not on file     Forced sexual activity: Not on file   Other Topics Concern    Not on file   Social History Narrative    Not on file       Patient does not have an advanced directive on file    Vitals:    12/12/19 1053   BP: 151/71   Pulse: 65   Resp: 12   Temp: 97 °F (36.1 °C)   TempSrc: Oral   SpO2: 100%   Weight: 199 lb (90.3 kg)   Height: 5' 11\" (1.803 m)   PainSc:   0 - No pain       Physical Exam  Vitals signs and nursing note reviewed. Cardiovascular:      Rate and Rhythm: Normal rate and regular rhythm. Pulses: Normal pulses. Heart sounds: Normal heart sounds. Pulmonary:      Effort: Pulmonary effort is normal.      Breath sounds: Normal breath sounds.          Hospital Outpatient Visit on 09/27/2019   Component Date Value Ref Range Status    Right cca dist sys 09/27/2019 66.1  cm/s Final    Right CCA dist crawley 09/27/2019 16.0  cm/s Final    RIGHT COMMON CAROTID ARTERY MID S 09/27/2019 63.21  cm/s Final    RIGHT COMMON CAROTID ARTERY MID D 09/27/2019 18.16  cm/s Final    Right CCA prox sys 09/27/2019 56.4  cm/s Final    Right CCA prox crawley 09/27/2019 11.1  cm/s Final    Right ICA dist sys 09/27/2019 43.9  cm/s Final    Right ICA dist crawley 09/27/2019 18.6  cm/s Final    Right ICA mid sys 09/27/2019 41.3  cm/s Final    Right ICA mid crawley 09/27/2019 16.7  cm/s Final    Right ICA prox sys 09/27/2019 30.9  cm/s Final    Right ICA prox crawley 09/27/2019 6.3  cm/s Final    Right eca sys 09/27/2019 60.7  cm/s Final    RIGHT EXTERNAL CAROTID ARTERY D 09/27/2019 0.00  cm/s Final    Right vertebral sys 09/27/2019 36.1  cm/s Final    RIGHT VERTEBRAL ARTERY D 09/27/2019 10.22  cm/s Final    Right ICA/CCA sys 09/27/2019 0.7   Final    Left CCA dist sys 09/27/2019 58.6  cm/s Final    Left CCA dist crawley 09/27/2019 10.3  cm/s Final    LEFT COMMON CAROTID ARTERY MID S 09/27/2019 58.57  cm/s Final    LEFT COMMON CAROTID ARTERY MID D 09/27/2019 14.22  cm/s Final    Left CCA prox sys 09/27/2019 76.8  cm/s Final    Left CCA prox crawley 09/27/2019 11.6  cm/s Final    Left ICA dist sys 09/27/2019 44.4  cm/s Final    Left ICA dist crawley 09/27/2019 16.7  cm/s Final    Left ICA mid sys 09/27/2019 55.6  cm/s Final    Left ICA mid crawley 09/27/2019 19.2  cm/s Final    Left ICA prox sys 09/27/2019 65.1  cm/s Final    Left ICA prox crawley 09/27/2019 20.7  cm/s Final    Left ECA sys 09/27/2019 48.1  cm/s Final    LEFT EXTERNAL CAROTID ARTERY D 09/27/2019 0.00  cm/s Final    Left vertebral sys 09/27/2019 50.7  cm/s Final    LEFT VERTEBRAL ARTERY D 09/27/2019 20.73  cm/s Final    Left ICA/CCA sys 09/27/2019 1.11   Final       .No results found for any visits on 12/12/19. Assessment / Plan:      ICD-10-CM ICD-9-CM    1. Essential hypertension I10 401.9 Blood Pressure Test Kit-Medium kit   2. Mixed hyperlipidemia E78.2 272.2    3. Tobacco use disorder F17.200 305.1        Follow-up and Dispositions    · Return in about 3 months (around 3/12/2020), or if symptoms worsen or fail to improve. I asked the patient if he  had any questions and answered his  questions. The patient stated that he understands the treatment plan and agrees with the treatment plan    This document was created with a voice activated dictation system and may contain transcription errors.

## 2020-01-14 DIAGNOSIS — I10 ESSENTIAL HYPERTENSION: ICD-10-CM

## 2020-01-14 RX ORDER — SPIRONOLACTONE 25 MG/1
TABLET ORAL
Qty: 60 TAB | Refills: 3 | Status: SHIPPED | OUTPATIENT
Start: 2020-01-14 | End: 2020-08-19 | Stop reason: SDUPTHER

## 2020-01-14 RX ORDER — PRAVASTATIN SODIUM 80 MG/1
TABLET ORAL
Qty: 30 TAB | Refills: 3 | Status: SHIPPED | OUTPATIENT
Start: 2020-01-14 | End: 2020-08-08

## 2020-06-02 NOTE — TELEPHONE ENCOUNTER
Last seen 12/12/19  Next appt  06/24/20    Requested Prescriptions     Pending Prescriptions Disp Refills    amLODIPine (NORVASC) 10 mg tablet 30 Tab 7    isosorbide mononitrate ER (IMDUR) 30 mg tablet 30 Tab 7    metoprolol succinate (TOPROL-XL) 200 mg XL tablet 30 Tab 5

## 2020-06-03 RX ORDER — METOPROLOL SUCCINATE 200 MG/1
TABLET, EXTENDED RELEASE ORAL
Qty: 30 TAB | Refills: 0 | Status: SHIPPED | OUTPATIENT
Start: 2020-06-03 | End: 2020-07-06

## 2020-06-03 RX ORDER — AMLODIPINE BESYLATE 10 MG/1
TABLET ORAL
Qty: 30 TAB | Refills: 0 | Status: SHIPPED | OUTPATIENT
Start: 2020-06-03 | End: 2020-07-06

## 2020-06-03 RX ORDER — ISOSORBIDE MONONITRATE 30 MG/1
TABLET, EXTENDED RELEASE ORAL
Qty: 30 TAB | Refills: 0 | Status: SHIPPED | OUTPATIENT
Start: 2020-06-03 | End: 2020-07-06

## 2020-07-06 RX ORDER — AMLODIPINE BESYLATE 10 MG/1
TABLET ORAL
Qty: 30 TAB | Refills: 0 | Status: SHIPPED | OUTPATIENT
Start: 2020-07-06 | End: 2020-07-30

## 2020-07-06 RX ORDER — ISOSORBIDE MONONITRATE 30 MG/1
TABLET, EXTENDED RELEASE ORAL
Qty: 30 TAB | Refills: 0 | Status: SHIPPED | OUTPATIENT
Start: 2020-07-06 | End: 2020-07-30

## 2020-07-06 RX ORDER — METOPROLOL SUCCINATE 200 MG/1
TABLET, EXTENDED RELEASE ORAL
Qty: 30 TAB | Refills: 0 | Status: SHIPPED | OUTPATIENT
Start: 2020-07-06 | End: 2020-07-30

## 2020-07-21 ENCOUNTER — OFFICE VISIT (OUTPATIENT)
Dept: CARDIOLOGY CLINIC | Age: 70
End: 2020-07-21

## 2020-07-21 VITALS
BODY MASS INDEX: 26.74 KG/M2 | DIASTOLIC BLOOD PRESSURE: 66 MMHG | SYSTOLIC BLOOD PRESSURE: 124 MMHG | HEIGHT: 71 IN | OXYGEN SATURATION: 98 % | HEART RATE: 64 BPM | WEIGHT: 191 LBS

## 2020-07-21 DIAGNOSIS — I25.119 CORONARY ARTERY DISEASE INVOLVING NATIVE CORONARY ARTERY OF NATIVE HEART WITH ANGINA PECTORIS (HCC): Primary | ICD-10-CM

## 2020-07-21 NOTE — PROGRESS NOTES
Caroline Bullard presents today for   Chief Complaint   Patient presents with    Coronary Artery Disease     1 year follow up - no cardiac complaints        Caroline Bullard preferred language for health care discussion is english/other. Is someone accompanying this pt? no    Is the patient using any DME equipment during [address]? no    Depression Screening:  3 most recent PHQ Screens 7/21/2020   Little interest or pleasure in doing things Not at all   Feeling down, depressed, irritable, or hopeless Not at all   Total Score PHQ 2 0       Learning Assessment:  Learning Assessment 5/23/2017   PRIMARY LEARNER Patient   CO-LEARNER CAREGIVER -   PRIMARY LANGUAGE ENGLISH   LEARNER PREFERENCE PRIMARY DEMONSTRATION   ANSWERED BY Patient   RELATIONSHIP SELF       Abuse Screening:  Abuse Screening Questionnaire 7/21/2020   Do you ever feel afraid of your partner? N   Are you in a relationship with someone who physically or mentally threatens you? N   Is it safe for you to go home? Y        Fall Risk  Fall Risk Assessment, last 12 mths 7/21/2020   Able to walk? Yes   Fall in past 12 months? No       Pt currently taking Anticoagulant therapy? ASA 81mg    Coordination of Care:  1. Have you been to the ER, urgent care clinic since your last visit? Hospitalized since your last visit? no    2. Have you seen or consulted any other health care providers outside of the [address] [place] [address] since your last visit? Include any pap smears or colon screening.  no

## 2020-07-21 NOTE — PROGRESS NOTES
HISTORY OF PRESENT ILLNESS  Kori Wu is a 79 y.o. male. ASSESSMENT and PLAN    Mr. Mayi Mcghee has history of small vessel disease in his coronary circulation. He has done well on medical therapy. Unfortunately, he still smokes cigarettes about one pack per day. He was a heavy drinker in the past; however, he has minimized to about 1-2 drinks every 2 to 3 weeks. Because of erectile dysfunction, he had taken Cialis in the past. He was able to come off long-acting nitrates by cutting back on his alcohol intake. · CAD:    He has known history of small vessel disease. Clinically he remains stable. · BP:   Well controlled. · HR:    Stable. · CHF:    Currently, there is no evidence of decompensated CHF noted. · Weight:    His weight today is 191 pounds. His baseline weight is 190 pounds. · Cholesterol:   Target LDL <70. Lovastatin 80, niacin 500 twice daily. · Tobacco: Unfortunately, he continues to smoke about half a pack daily. · Anti-platelet:   Remains on ASA. Again, a lengthy discussion was carried on about the importance of medication compliance, follow-up compliance, and lifestyle changes.    Also, tobacco abstinence and alcohol in moderation was reemphasized. I will see him back annually. Thank you. Encounter Diagnoses   Name Primary?  Coronary artery disease involving native coronary artery of native heart with angina pectoris ([facility] [address] Yes     current treatment plan is effective, no change in therapy  lab results and schedule of future lab studies reviewed with patient  reviewed diet, exercise and weight control  very strongly urged to quit smoking to reduce cardiovascular risk  cardiovascular risk and specific lipid/LDL goals reviewed  use of aspirin to prevent MI and TIA's discussed      HPI   Today, Mr. de la cruz has no complaints of chest pains, increased shortness of breath or decreased exercise capacity. Despite his age, he remains reasonably active physically. Unfortunately, he still smokes about half a pack a day. However, he has cut back on his drinking significantly; he drinks maybe 1-2 drinks every 3 to 4 weeks. He denies any orthopnea or PND. He denies any palpitations or dizziness. Review of Systems   Respiratory: Negative for shortness of breath. Cardiovascular: Negative for chest pain, palpitations, orthopnea, claudication, leg swelling and PND. All other systems reviewed and are negative. Physical Exam  Vitals signs and nursing note reviewed. Constitutional:       Appearance: Normal appearance. HENT:      Head: Normocephalic. Eyes:      Extraocular Movements: Extraocular movements intact. Conjunctiva/sclera: Conjunctivae normal.   Neck:      Musculoskeletal: No neck rigidity. Cardiovascular:      Rate and Rhythm: Normal rate and regular rhythm. Pulmonary:      Breath sounds: Normal breath sounds. Abdominal:      General: Bowel sounds are normal.      Palpations: Abdomen is soft. Musculoskeletal:         General: No swelling. Skin:     General: Skin is warm and dry. Neurological:      General: No focal deficit present. Mental Status: He is alert and oriented to person, place, and time. Psychiatric:         Mood and Affect: Mood normal.         Behavior: Behavior normal.         PCP: Rosa Gomez NP    Past Medical History:   Diagnosis Date    CAD (coronary artery disease)     Cardiac catheterization 03/23/2001    RCA patent. m/dRPDA 55%. LM patent. LAD patent. CX patent. oOMB 75%. LVEDP 8-10 mmHg. EF 50%. Global hypk. Patent bilateral renal arteries.  Cardiac echocardiogram 10/24/2012    EF 55-60%. No WMA. Mild conc LVH. Gr 1 DDfx. RVSP 20-25 mmHg. Mild LAE. Mild ADAM. Possible sm pericardial effusion but likely fat pad.  Cardiac nuclear imaging test, low risk 10/24/2012    No ischemia or prior infarction. EF 58%. Normal EKG on max EST. Ex time 6 min. Low risk.     History of alcoholism ([facility] [address]     Personal history      Hypercholesteremia     Hypertension     Hypertrophy (benign) of prostate     Without urinary obstruction and other lower urinary tract symptoms (LUTS)    Mixed hyperlipidemia        Past Surgical History:   Procedure Laterality Date    HX COLONOSCOPY  10/2015    neg; h/o polyps    HX HEART CATHETERIZATION  03/23/2001    HX OTHER SURGICAL      sgy to correct club foot       Current Outpatient Medications   Medication Sig Dispense Refill    amLODIPine (NORVASC) 10 mg tablet TAKE 1 TABLET BY MOUTH ONCE DAILY FOR HIGH BLOOD PRESSURE 30 Tab 0    metoprolol succinate (TOPROL-XL) 200 mg XL tablet TAKE 1 TABLET BY MOUTH EVERY DAY 30 Tab 0    isosorbide mononitrate ER (IMDUR) 30 mg tablet TAKE 1 TABLET BY MOUTH EVERY DAY 30 Tab 0    pravastatin (PRAVACHOL) 80 mg tablet TAKE 1 TABLET BY MOUTH EVERY DAY 30 Tab 3    spironolactone (ALDACTONE) 25 mg tablet TAKE 1 TABLET BY MOUTH TWICE A DAY 60 Tab 3    Blood Pressure Test Kit-Medium kit 1 Kit by Does Not Apply route two (2) times a day. 1 Kit 0    ergocalciferol (ERGOCALCIFEROL) 50,000 unit capsule Take 1 Cap by mouth every seven (7) days. Indications: Vitamin D Deficiency (High Dose Therapy) 12 Cap 1    omega-3 fatty acids-vitamin e (FISH OIL) 1,000 mg Cap Take 1 capsule by mouth as needed.  aspirin (ASPIRIN) 325 mg tablet Take 325 mg by mouth daily.            The patient has a family history of    Social History     Tobacco Use    Smoking status: Current Every Day Smoker     Packs/day: 2.50    Smokeless tobacco: Never Used   Substance Use Topics    Alcohol use: Yes     Comment: 10 beers a day    Drug use: No       Lab Results   Component Value Date/Time    Cholesterol, total 163 04/25/2019 10:58 AM    HDL Cholesterol 42 04/25/2019 10:58 AM    LDL, calculated 102.6 (H) 04/25/2019 10:58 AM    Triglyceride 92 04/25/2019 10:58 AM    CHOL/HDL Ratio 3.9 04/25/2019 10:58 AM        BP Readings from Last 3 Encounters: 07/21/20 124/66   12/12/19 151/71   09/13/19 149/84        Pulse Readings from Last 3 Encounters:   07/21/20 64   12/12/19 65   09/13/19 85       Wt Readings from Last 3 Encounters:   07/21/20 86.6 kg (191 lb)   12/12/19 90.3 kg (199 lb)   09/13/19 85.7 kg (189 lb)         EKG: unchanged from previous tracings, normal sinus rhythm, LVH with repolarization changes.

## 2020-07-30 RX ORDER — METOPROLOL SUCCINATE 200 MG/1
TABLET, EXTENDED RELEASE ORAL
Qty: 30 TAB | Refills: 0 | Status: SHIPPED | OUTPATIENT
Start: 2020-07-30 | End: 2020-09-07

## 2020-07-30 RX ORDER — AMLODIPINE BESYLATE 10 MG/1
TABLET ORAL
Qty: 30 TAB | Refills: 0 | Status: SHIPPED | OUTPATIENT
Start: 2020-07-30 | End: 2020-09-07

## 2020-07-30 RX ORDER — ISOSORBIDE MONONITRATE 30 MG/1
TABLET, EXTENDED RELEASE ORAL
Qty: 30 TAB | Refills: 0 | Status: SHIPPED | OUTPATIENT
Start: 2020-07-30 | End: 2020-09-07

## 2020-08-19 DIAGNOSIS — I10 ESSENTIAL HYPERTENSION: ICD-10-CM

## 2020-08-19 RX ORDER — SPIRONOLACTONE 25 MG/1
TABLET ORAL
Qty: 60 TAB | Refills: 3 | Status: SHIPPED | OUTPATIENT
Start: 2020-08-19 | End: 2020-12-22

## 2020-08-19 NOTE — TELEPHONE ENCOUNTER
Requested Prescriptions     Pending Prescriptions Disp Refills    spironolactone (ALDACTONE) 25 mg tablet 60 Tab 3

## 2020-08-27 ENCOUNTER — VIRTUAL VISIT (OUTPATIENT)
Dept: FAMILY MEDICINE CLINIC | Facility: CLINIC | Age: 70
End: 2020-08-27

## 2020-08-27 DIAGNOSIS — Z13.39 SCREENING FOR ALCOHOLISM: Primary | ICD-10-CM

## 2020-08-27 DIAGNOSIS — E55.9 VITAMIN D DEFICIENCY: ICD-10-CM

## 2020-08-27 DIAGNOSIS — E78.2 MIXED HYPERLIPIDEMIA: ICD-10-CM

## 2020-08-27 DIAGNOSIS — Z00.00 MEDICARE ANNUAL WELLNESS VISIT, SUBSEQUENT: ICD-10-CM

## 2020-08-27 DIAGNOSIS — Z12.5 PROSTATE CANCER SCREENING: ICD-10-CM

## 2020-08-27 DIAGNOSIS — I10 ESSENTIAL HYPERTENSION: ICD-10-CM

## 2020-08-27 RX ORDER — CALCIUM CARBONATE 750 MG/1
1 TABLET, CHEWABLE ORAL 2 TIMES DAILY
Qty: 1 KIT | Refills: 0 | Status: SHIPPED | OUTPATIENT
Start: 2020-08-27

## 2020-08-27 NOTE — PROGRESS NOTES
This is the Subsequent Medicare Annual Wellness Exam, performed 12 months or more after the Initial AWV or the last Subsequent AWV    I have reviewed the patient's medical history in detail and updated the computerized patient record. History     Patient Active Problem List   Diagnosis Code    Hypercholesteremia E78.00    Hypertension I10    CAD (coronary artery disease) I25.10    Tobacco use disorder F17.200    Alcohol abuse F10.10    History of alcoholism ([facility] [address] F10.21    Hypertrophy (benign) of prostate N40.0    Mixed hyperlipidemia E78.2    Advance directive discussed with patient Z70.80     Past Medical History:   Diagnosis Date    CAD (coronary artery disease)     Cardiac catheterization 03/23/2001    RCA patent. m/dRPDA 55%. LM patent. LAD patent. CX patent. oOMB 75%. LVEDP 8-10 mmHg. EF 50%. Global hypk. Patent bilateral renal arteries.  Cardiac echocardiogram 10/24/2012    EF 55-60%. No WMA. Mild conc LVH. Gr 1 DDfx. RVSP 20-25 mmHg. Mild LAE. Mild ADAM. Possible sm pericardial effusion but likely fat pad.  Cardiac nuclear imaging test, low risk 10/24/2012    No ischemia or prior infarction. EF 58%. Normal EKG on max EST. Ex time 6 min. Low risk.  History of alcoholism (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  1 Kit 0    spironolactone (ALDACTONE) 25 mg tablet TAKE 1 TABLET BY MOUTH TWICE A DAY 60 Tab 3    pravastatin (PRAVACHOL) 80 mg tablet TAKE 1 TABLET BY MOUTH EVERY DAY 30 Tab 0    amLODIPine (NORVASC) 10 mg tablet TAKE 1 TABLET BY MOUTH ONCE DAILY FOR HIGH BLOOD PRESSURE 30 Tab 0    metoprolol succinate (TOPROL-XL) 200 mg XL tablet TAKE 1 TABLET BY MOUTH EVERY DAY 30 Tab 0    isosorbide mononitrate ER (IMDUR) 30 mg tablet TAKE 1 TABLET BY MOUTH EVERY DAY 30 Tab 0    aspirin (ASPIRIN) 325 mg tablet Take 325 mg by mouth daily.  ergocalciferol (ERGOCALCIFEROL) 50,000 unit capsule Take 1 Cap by mouth every seven (7) days. Indications: Vitamin D Deficiency (High Dose Therapy) 12 Cap 1    omega-3 fatty acids-vitamin e (FISH OIL) 1,000 mg Cap Take 1 capsule by mouth as needed. Allergies   Allergen Reactions    Lisinopril Anaphylaxis and Swelling     Swelling of tongue,  angioedema       Family History   Problem Relation Age of Onset    Heart Disease Mother     Heart Surgery Mother     Dementia Father      Social History     Tobacco Use    Smoking status: Current Every Day Smoker     Packs/day: 2.50    Smokeless tobacco: Never Used   Substance Use Topics    Alcohol use: Yes     Comment: 10 beers a day       Depression Risk Factor Screening:     3 most recent PHQ Screens 8/27/2020   Little interest or pleasure in doing things Not at all   Feeling down, depressed, irritable, or hopeless Not at all   Total Score PHQ 2 0       Alcohol Risk Factor Screening (MALE > 65): Do you average more 1 drink per night or more than 7 drinks a week: No    In the past three months have you have had more than 4 drinks containing alcohol on one occasion: No      Functional Ability and Level of Safety:   Hearing: Hearing is good. Activities of Daily Living: The home contains: no safety equipment. Patient does total self care     Ambulation: with no difficulty     Fall Risk:  Fall Risk Assessment, last 12 mths 8/27/2020   Able to walk? Yes   Fall in past 12 months?  No     Abuse Screen:  Patient is not abused       Cognitive Screening   Has your family/caregiver stated any concerns about your memory: no    Cognitive Screening: Normal - normal recall    Patient Care Team   Patient Care Team:  Leyla Carson NP as PCP - General (Nurse Practitioner)  Leyla Carson NP as PCP - [facility] Provider    Assessment/Plan   Education and counseling provided:  Are appropriate based on today's review and evaluation    Diagnoses and all orders for this visit:    1. Screening for alcoholism    2. Essential hypertension  -     CBC WITH AUTOMATED DIFF; Future  -     METABOLIC PANEL, COMPREHENSIVE; Future  -     Blood Pressure Test Kit-Medium kit; 1 Kit by Does Not Apply route two (2) times a day. 3. Mixed hyperlipidemia  -     LIPID PANEL; Future    4. Vitamin D deficiency  -     VITAMIN D, 25 HYDROXY; Future    5. Prostate cancer screening  -     PSA SCREENING (SCREENING); Future    6. Medicare annual wellness visit, subsequent        Health Maintenance Due   Topic Date Due    DTaP/Tdap/Td series (1 - Tdap) 04/03/1971    Shingrix Vaccine Age 50> (1 of 2) 04/03/2000    Pneumococcal 65+ years (1 of 1 - PPSV23) 04/03/2015    Lipid Screen  04/25/2020    Medicare Yearly Exam  05/13/2020       Nell Salazar, who was evaluated through a synchronous (real-time) audio only encounter, and/or his healthcare decision maker, is aware that it is a billable service, with coverage as determined by his insurance carrier. He provided verbal consent to proceed: n/a- consent obtained within past 12 months, and patient identification was verified. It was conducted pursuant to the emergency declaration under the [facility], [address] [place] [address] authority and the [employer] General Act. A caregiver was present when appropriate. Ability to conduct physical exam was limited. I was in the office. The patient was at home.     Amauri Das NP

## 2020-08-27 NOTE — PROGRESS NOTES
Lucia Hidalgo is a 79 y.o. male who was seen by synchronous (real-time) audio-video technology on 8/27/2020 for No chief complaint on file. Assessment & Plan:   Diagnoses and all orders for this visit:    1. Essential hypertension  -     CBC WITH AUTOMATED DIFF; Future  -     METABOLIC PANEL, COMPREHENSIVE; Future  -     Blood Pressure Test Kit-Medium kit; 1 Kit by Does Not Apply route two (2) times a day. 2. Mixed hyperlipidemia  -     LIPID PANEL; Future    3. Vitamin D deficiency  -     VITAMIN D, 25 HYDROXY; Future    4. Prostate cancer screening  -     PSA SCREENING (SCREENING); Future            Subjective: The patient presents for an Audio-visual teleconference appointment for hypertension and hyperlipidemia follow-up care. HTN- patient BP has been stable. He denies any CP, palpitation or lower extremity edema. He is compliant with the HTN treatment plan  HLD- last lipid panel was 4/25/2019. His LDL was 102, HDL 42, and cholesterol 163. He denies any cough, fever, loss of smell or taste or GI upset. Prior to Admission medications    Medication Sig Start Date End Date Taking? Authorizing Provider   Blood Pressure Test Kit-Medium kit 1 Kit by Does Not Apply route two (2) times a day. 8/27/20  Yes Theodor Rough, NP   spironolactone (ALDACTONE) 25 mg tablet TAKE 1 TABLET BY MOUTH TWICE A DAY 8/19/20  Yes Aracelisodor Rough, NP   pravastatin (PRAVACHOL) 80 mg tablet TAKE 1 TABLET BY MOUTH EVERY DAY 8/8/20  Yes Aracelisodor Maynor, NP   amLODIPine (NORVASC) 10 mg tablet TAKE 1 TABLET BY MOUTH ONCE DAILY FOR HIGH BLOOD PRESSURE 7/30/20  Yes Aracelisodor Maynor, NP   metoprolol succinate (TOPROL-XL) 200 mg XL tablet TAKE 1 TABLET BY MOUTH EVERY DAY 7/30/20  Yes Aracelisodor Maynor, NP   isosorbide mononitrate ER (IMDUR) 30 mg tablet TAKE 1 TABLET BY MOUTH EVERY DAY 7/30/20  Yes Oleg Mcguire NP   aspirin (ASPIRIN) 325 mg tablet Take 325 mg by mouth daily.      Yes Provider, Historical   ergocalciferol (ERGOCALCIFEROL) 50,000 unit capsule Take 1 Cap by mouth every seven (7) days. Indications: Vitamin D Deficiency (High Dose Therapy) 9/13/19   Mitch Conrad NP   omega-3 fatty acids-vitamin e (FISH OIL) 1,000 mg Cap Take 1 capsule by mouth as needed. Provider, Historical     Patient Active Problem List   Diagnosis Code    Hypercholesteremia E78.00    Hypertension I10    CAD (coronary artery disease) I25.10    Tobacco use disorder F17.200    Alcohol abuse F10.10    History of alcoholism ([facility] [address] F10.21    Hypertrophy (benign) of prostate N40.0    Mixed hyperlipidemia E78.2    Advance directive discussed with patient Z71.89     Patient Active Problem List    Diagnosis Date Noted    Advance directive discussed with patient 12/06/2016    History of alcoholism ([facility] [address]     Hypertrophy (benign) of prostate     Mixed hyperlipidemia     Tobacco use disorder 09/24/2012    Alcohol abuse 09/24/2012    CAD (coronary artery disease)     Hypercholesteremia 07/11/2012    Hypertension 07/11/2012     Current Outpatient Medications   Medication Sig Dispense Refill    Blood Pressure Test Kit-Medium kit 1 Kit by Does Not Apply route two (2) times a day. 1 Kit 0    spironolactone (ALDACTONE) 25 mg tablet TAKE 1 TABLET BY MOUTH TWICE A DAY 60 Tab 3    pravastatin (PRAVACHOL) 80 mg tablet TAKE 1 TABLET BY MOUTH EVERY DAY 30 Tab 0    amLODIPine (NORVASC) 10 mg tablet TAKE 1 TABLET BY MOUTH ONCE DAILY FOR HIGH BLOOD PRESSURE 30 Tab 0    metoprolol succinate (TOPROL-XL) 200 mg XL tablet TAKE 1 TABLET BY MOUTH EVERY DAY 30 Tab 0    isosorbide mononitrate ER (IMDUR) 30 mg tablet TAKE 1 TABLET BY MOUTH EVERY DAY 30 Tab 0    aspirin (ASPIRIN) 325 mg tablet Take 325 mg by mouth daily.  ergocalciferol (ERGOCALCIFEROL) 50,000 unit capsule Take 1 Cap by mouth every seven (7) days.  Indications: Vitamin D Deficiency (High Dose Therapy) 12 Cap 1    omega-3 fatty acids-vitamin e (FISH OIL) 1,000 mg Cap Take 1 capsule by mouth as needed. Allergies   Allergen Reactions    Lisinopril Anaphylaxis and Swelling     Swelling of tongue,  angioedema     Past Medical History:   Diagnosis Date    CAD (coronary artery disease)     Cardiac catheterization 03/23/2001    RCA patent. m/dRPDA 55%. LM patent. LAD patent. CX patent. oOMB 75%. LVEDP 8-10 mmHg. EF 50%. Global hypk. Patent bilateral renal arteries.  Cardiac echocardiogram 10/24/2012    EF 55-60%. No WMA. Mild conc LVH. Gr 1 DDfx. RVSP 20-25 mmHg. Mild LAE. Mild ADAM. Possible sm pericardial effusion but likely fat pad.  Cardiac nuclear imaging test, low risk 10/24/2012    No ischemia or prior infarction. EF 58%. Normal EKG on max EST. Ex time 6 min. Low risk.  History of alcoholism ([facility] [address]     Personal history      Hypercholesteremia     Hypertension     Hypertrophy (benign) of prostate     Without urinary obstruction and other lower urinary tract symptoms (LUTS)    Mixed hyperlipidemia        ROS    Constitutional: No apparent distress noted  General- negative for fever, chills or fatigue  Eyes- negative visual changes  CV- denies chest pain, palpitation  Pul: negative cough or SOB  GI: negative nausea, flank pain, diarrhea, constipation  Urinary:- No dysuria or polyuria  MS- negative myalgia, negative joint pain  Neuro- negative headache, dizziness or weakness  Skin- negative for rashes or lesions. Psych- denies any anxiety or depression    Objective:   No flowsheet data found.      [INSTRUCTIONS:  \"[x]\" Indicates a positive item  \"[]\" Indicates a negative item  -- DELETE ALL ITEMS NOT EXAMINED]    Constitutional: [x] Appears well-developed and well-nourished [x] No apparent distress      [] Abnormal -     Mental status: [x] Alert and awake  [x] Oriented to person/place/time [x] Able to follow commands    [] Abnormal -     Eyes:   EOM    [x]  Normal    [] Abnormal -   Sclera  [x]  Normal    [] Abnormal -          Discharge [x]  None visible   [] Abnormal -     HENT: [x] Normocephalic, atraumatic  [] Abnormal -   [x] Mouth/Throat: Mucous membranes are moist    External Ears [x] Normal  [] Abnormal -    Neck: [x] No visualized mass [] Abnormal -     Pulmonary/Chest: [x] Respiratory effort normal   [x] No visualized signs of difficulty breathing or respiratory distress        [] Abnormal -      Musculoskeletal:   [x] Normal gait with no signs of ataxia         [x] Normal range of motion of neck        [] Abnormal -     Neurological:        [x] No Facial Asymmetry (Cranial nerve 7 motor function) (limited exam due to video visit)          [x] No gaze palsy        [] Abnormal -          Skin:        [x] No significant exanthematous lesions or discoloration noted on facial skin         [] Abnormal -            Psychiatric:       [x] Normal Affect [] Abnormal -        [x] No Hallucinations    Other pertinent observable physical exam findings:-        We discussed the expected course, resolution and complications of the diagnosis(es) in detail. Medication risks, benefits, costs, interactions, and alternatives were discussed as indicated. I advised him to contact the office if his condition worsens, changes or fails to improve as anticipated. He expressed understanding with the diagnosis(es) and plan. Renetta Rockwell, who was evaluated through a patient-initiated, synchronous (real-time) audio-video encounter, and/or his healthcare decision maker, is aware that it is a billable service, with coverage as determined by his insurance carrier. He provided verbal consent to proceed: Yes, and patient identification was verified. It was conducted pursuant to the emergency declaration under the [address] [place] [address] authority and the [employer] General Act. A caregiver was present when appropriate.  Ability to conduct physical exam was limited. I was at home. The patient was at home.       Ewa Trinh NP

## 2020-09-01 DIAGNOSIS — E55.9 VITAMIN D DEFICIENCY: ICD-10-CM

## 2020-09-01 NOTE — TELEPHONE ENCOUNTER
Requested Prescriptions     Pending Prescriptions Disp Refills    ergocalciferol (ERGOCALCIFEROL) 1,250 mcg (50,000 unit) capsule 12 Cap 1     Sig: Take 1 Cap by mouth every seven (7) days.  Indications: vitamin D deficiency (high dose therapy)

## 2020-09-01 NOTE — PATIENT INSTRUCTIONS
Medicare Wellness Visit, Male The best way to live healthy is to have a lifestyle where you eat a well-balanced diet, exercise regularly, limit alcohol use, and quit all forms of tobacco/nicotine, if applicable. Regular preventive services are another way to keep healthy. Preventive services (vaccines, screening tests, monitoring & exams) can help personalize your care plan, which helps you manage your own care. Screening tests can find health problems at the earliest stages, when they are easiest to treat. [facility] follows the current, evidence-based guidelines published by the [facility] ([facility]) when recommending preventive services for our patients. Because we follow these guidelines, sometimes recommendations change over time as research supports it. (For example, a prostate screening blood test is no longer routinely recommended for men with no symptoms). Of course, you and your doctor may decide to screen more often for some diseases, based on your risk and co-morbidities (chronic disease you are already diagnosed with). Preventive services for you include: - Medicare offers their members a free annual wellness visit, which is time for you and your primary care provider to discuss and plan for your preventive service needs. Take advantage of this benefit every year! 
-All adults over age 72 should receive the recommended pneumonia vaccines. Current USPSTF guidelines recommend a series of two vaccines for the best pneumonia protection.  
-All adults should have a flu vaccine yearly and tetanus vaccine every 10 years. 
-All adults age 48 and older should receive the shingles vaccines (series of two vaccines).       
-All adults age 38-68 who are overweight should have a diabetes screening test once every three years.  
-Other screening tests & preventive services for persons with diabetes include: an eye exam to screen for diabetic retinopathy, a kidney function test, a foot exam, and stricter control over your cholesterol.  
-Cardiovascular screening for adults with routine risk involves an electrocardiogram (ECG) at intervals determined by the provider.  
-Colorectal cancer screening should be done for adults age 54-65 with no increased risk factors for colorectal cancer. There are a number of acceptable methods of screening for this type of cancer. Each test has its own benefits and drawbacks. Discuss with your provider what is most appropriate for you during your annual wellness visit. The different tests include: colonoscopy (considered the best screening method), a fecal occult blood test, a fecal DNA test, and sigmoidoscopy. 
-All adults born between [facility] should be screened once for Hepatitis C. 
-An Abdominal Aortic Aneurysm (AAA) Screening is recommended for men age 73-68 who has ever smoked in their lifetime. Here is a list of your current Health Maintenance items (your personalized list of preventive services) with a due date: 
Health Maintenance Due Topic Date Due  
 DTaP/Tdap/Td  (1 - Tdap) 04/03/1971  Shingles Vaccine (1 of 2) 04/03/2000  Pneumococcal Vaccine (1 of 1 - PPSV23) 04/03/2015  Cholesterol Test   04/25/2020 [facility] Annual Well Visit  05/13/2020

## 2020-09-07 RX ORDER — ERGOCALCIFEROL 1.25 MG/1
50000 CAPSULE ORAL
Qty: 12 CAP | Refills: 1 | Status: SHIPPED | OUTPATIENT
Start: 2020-09-07 | End: 2021-03-15

## 2020-09-18 ENCOUNTER — HOSPITAL ENCOUNTER (OUTPATIENT)
Dept: LAB | Age: 70
Discharge: HOME | End: 2020-09-18
Payer: MEDICARE

## 2020-09-18 DIAGNOSIS — E78.2 MIXED HYPERLIPIDEMIA: ICD-10-CM

## 2020-09-18 DIAGNOSIS — E55.9 VITAMIN D DEFICIENCY: ICD-10-CM

## 2020-09-18 DIAGNOSIS — I10 ESSENTIAL HYPERTENSION: ICD-10-CM

## 2020-09-18 DIAGNOSIS — Z12.5 PROSTATE CANCER SCREENING: ICD-10-CM

## 2020-09-18 LAB
25(OH)D3 SERPL-MCNC: 37.1 NG/ML (ref 30–100)
ALBUMIN SERPL-MCNC: 4 G/DL (ref 3.4–5)
ALBUMIN/GLOB SERPL: 1.1 {RATIO} (ref 0.8–1.7)
ALP SERPL-CCNC: 71 U/L (ref 45–117)
ALT SERPL-CCNC: 13 U/L (ref 16–61)
ANION GAP SERPL CALC-SCNC: 5 MMOL/L (ref 3–18)
AST SERPL-CCNC: 25 U/L (ref 10–38)
BASOPHILS # BLD: 0.1 K/UL (ref 0–0.1)
BASOPHILS NFR BLD: 1 % (ref 0–2)
BILIRUB SERPL-MCNC: 0.5 MG/DL (ref 0.2–1)
BUN SERPL-MCNC: 11 MG/DL (ref 7–18)
BUN/CREAT SERPL: 16 (ref 12–20)
CALCIUM SERPL-MCNC: 9.1 MG/DL (ref 8.5–10.1)
CHLORIDE SERPL-SCNC: 111 MMOL/L (ref 100–111)
CHOLEST SERPL-MCNC: 135 MG/DL
CO2 SERPL-SCNC: 26 MMOL/L (ref 21–32)
CREAT SERPL-MCNC: 0.67 MG/DL (ref 0.6–1.3)
DIFFERENTIAL METHOD BLD: ABNORMAL
EOSINOPHIL # BLD: 0.1 K/UL (ref 0–0.4)
EOSINOPHIL NFR BLD: 2 % (ref 0–5)
ERYTHROCYTE [DISTWIDTH] IN BLOOD BY AUTOMATED COUNT: 16 % (ref 11.6–14.5)
GLOBULIN SER CALC-MCNC: 3.5 G/DL (ref 2–4)
GLUCOSE SERPL-MCNC: 78 MG/DL (ref 74–99)
HCT VFR BLD AUTO: 37.7 % (ref 36–48)
HDLC SERPL-MCNC: 39 MG/DL (ref 40–60)
HDLC SERPL: 3.5 {RATIO} (ref 0–5)
HGB BLD-MCNC: 12.1 G/DL (ref 13–16)
LDLC SERPL CALC-MCNC: 80.4 MG/DL (ref 0–100)
LIPID PROFILE,FLP: ABNORMAL
LYMPHOCYTES # BLD: 1.8 K/UL (ref 0.9–3.6)
LYMPHOCYTES NFR BLD: 36 % (ref 21–52)
MCH RBC QN AUTO: 28.9 PG (ref 24–34)
MCHC RBC AUTO-ENTMCNC: 32.1 G/DL (ref 31–37)
MCV RBC AUTO: 90.2 FL (ref 74–97)
MONOCYTES # BLD: 0.6 K/UL (ref 0.05–1.2)
MONOCYTES NFR BLD: 12 % (ref 3–10)
NEUTS SEG # BLD: 2.4 K/UL (ref 1.8–8)
NEUTS SEG NFR BLD: 49 % (ref 40–73)
PLATELET # BLD AUTO: 170 K/UL (ref 135–420)
PMV BLD AUTO: 12.9 FL (ref 9.2–11.8)
POTASSIUM SERPL-SCNC: 3.8 MMOL/L (ref 3.5–5.5)
PROT SERPL-MCNC: 7.5 G/DL (ref 6.4–8.2)
PSA SERPL-MCNC: 0.5 NG/ML (ref 0–4)
RBC # BLD AUTO: 4.18 M/UL (ref 4.7–5.5)
SODIUM SERPL-SCNC: 142 MMOL/L (ref 136–145)
TRIGL SERPL-MCNC: 78 MG/DL (ref ?–150)
VLDLC SERPL CALC-MCNC: 15.6 MG/DL
WBC # BLD AUTO: 4.9 K/UL (ref 4.6–13.2)

## 2020-09-18 PROCEDURE — 80053 COMPREHEN METABOLIC PANEL: CPT

## 2020-09-18 PROCEDURE — 36415 COLL VENOUS BLD VENIPUNCTURE: CPT

## 2020-09-18 PROCEDURE — 80061 LIPID PANEL: CPT

## 2020-09-18 PROCEDURE — 82306 VITAMIN D 25 HYDROXY: CPT

## 2020-09-18 PROCEDURE — 84153 ASSAY OF PSA TOTAL: CPT

## 2020-09-18 PROCEDURE — 85025 COMPLETE CBC W/AUTO DIFF WBC: CPT

## 2020-11-05 ENCOUNTER — TELEPHONE (OUTPATIENT)
Dept: FAMILY MEDICINE CLINIC | Age: 70
End: 2020-11-05

## 2020-11-05 NOTE — TELEPHONE ENCOUNTER
TC to patient three times to triage before his appointment. Left  for patient to return my call at [phone].

## 2020-11-19 ENCOUNTER — VIRTUAL VISIT (OUTPATIENT)
Dept: FAMILY MEDICINE CLINIC | Age: 70
End: 2020-11-19
Payer: MEDICARE

## 2020-11-19 DIAGNOSIS — I10 ESSENTIAL HYPERTENSION: Primary | ICD-10-CM

## 2020-11-19 DIAGNOSIS — E78.2 MIXED HYPERLIPIDEMIA: ICD-10-CM

## 2020-11-19 DIAGNOSIS — E55.9 VITAMIN D DEFICIENCY: ICD-10-CM

## 2020-11-19 PROCEDURE — G8427 DOCREV CUR MEDS BY ELIG CLIN: HCPCS | Performed by: NURSE PRACTITIONER

## 2020-11-19 PROCEDURE — 99213 OFFICE O/P EST LOW 20 MIN: CPT | Performed by: NURSE PRACTITIONER

## 2020-11-19 PROCEDURE — G8756 NO BP MEASURE DOC: HCPCS | Performed by: NURSE PRACTITIONER

## 2020-11-19 PROCEDURE — 1101F PT FALLS ASSESS-DOCD LE1/YR: CPT | Performed by: NURSE PRACTITIONER

## 2020-11-19 PROCEDURE — 3017F COLORECTAL CA SCREEN DOC REV: CPT | Performed by: NURSE PRACTITIONER

## 2020-11-19 PROCEDURE — G8432 DEP SCR NOT DOC, RNG: HCPCS | Performed by: NURSE PRACTITIONER

## 2020-11-19 NOTE — PROGRESS NOTES
Jackie December presents today for   Chief Complaint   Patient presents with    Hypertension     follow-up       Virtual/telephone visit    Depression Screening:  3 most recent PHQ Screens 11/19/2020   Little interest or pleasure in doing things Not at all   Feeling down, depressed, irritable, or hopeless Not at all   Total Score PHQ 2 0       Learning Assessment:  Learning Assessment 5/23/2017   PRIMARY LEARNER Patient   CO-LEARNER CAREGIVER -   PRIMARY LANGUAGE ENGLISH   LEARNER PREFERENCE PRIMARY DEMONSTRATION   ANSWERED BY Patient   RELATIONSHIP SELF       Abuse Screening:  Abuse Screening Questionnaire 11/19/2020   Do you ever feel afraid of your partner? N   Are you in a relationship with someone who physically or mentally threatens you? N   Is it safe for you to go home? Y       Fall Risk  Fall Risk Assessment, last 12 mths 11/19/2020   Able to walk? Yes   Fall in past 12 months? No         Health Maintenance reviewed and discussed and ordered per Provider. Health Maintenance Due   Topic Date Due    DTaP/Tdap/Td series (1 - Tdap) 04/03/1971    Shingrix Vaccine Age 50> (1 of 2) 04/03/2000    Pneumococcal 65+ years (1 of 1 - PPSV23) 04/03/2015    Flu Vaccine (1) 09/01/2020    Colorectal Cancer Screening Combo  10/28/2020   . Coordination of Care:  1. Have you been to the ER, urgent care clinic since your last visit? Hospitalized since your last visit? no    2. Have you seen or consulted any other health care providers outside of the [address] [place] [address] since your last visit? Include any pap smears or colon screening.  no

## 2020-11-19 NOTE — PROGRESS NOTES
Brenda Coelho is a 79 y.o. male who was seen by synchronous (real-time) audio-video technology on 11/19/2020 for Hypertension (follow-up)    Assessment & Plan:   Diagnoses and all orders for this visit:    1. Essential hypertension    2. Mixed hyperlipidemia    3. Vitamin D deficiency    Fasting labs completed in September, 2020        Subjective: The patient presents for an Audio-visual teleconference appointment for HTN, HLD and Vitamin D deficiency. Patient has a history of tobacco and ETOH abuse. He notes he hasn't had an alcoholic drink in 3 months. He continues to smoke approximately 1 pack of cigarettes/days. HTN- this a chronic diagnosis. He is negative for chest pain or palpitation. He is compliant with the medications plan. HLD- Last lipid panel in September, 2020. His cholesterol panel is 135, HDL-39, and LDL-80.4  He is negative for cough, fever, loss of smell/taste or GI upset. Prior to Admission medications    Medication Sig Start Date End Date Taking? Authorizing Provider   ergocalciferol (ERGOCALCIFEROL) 1,250 mcg (50,000 unit) capsule Take 1 Cap by mouth every seven (7) days. Indications: vitamin D deficiency (high dose therapy) 9/7/20  Yes Malick Espinoza NP   pravastatin (PRAVACHOL) 80 mg tablet TAKE 1 TABLET BY MOUTH EVERY DAY 9/7/20  Yes Malick Espinoza NP   isosorbide mononitrate ER (IMDUR) 30 mg tablet TAKE 1 TABLET BY MOUTH EVERY DAY 9/7/20  Yes Malick Espinoza NP   metoprolol succinate (TOPROL-XL) 200 mg XL tablet TAKE 1 TABLET BY MOUTH EVERY DAY 9/7/20  Yes Malick Espinoza NP   amLODIPine (NORVASC) 10 mg tablet TAKE 1 TABLET BY MOUTH ONCE DAILY FOR HIGH BLOOD PRESSURE 9/7/20  Yes Malick Espinoza NP   Blood Pressure Test Kit-Medium kit 1 Kit by Does Not Apply route two (2) times a day.  8/27/20  Yes Malick Espinoza NP   spironolactone (ALDACTONE) 25 mg tablet TAKE 1 TABLET BY MOUTH TWICE A DAY 8/19/20  Yes Malick Espinoza NP omega-3 fatty acids-vitamin e (FISH OIL) 1,000 mg Cap Take 1 capsule by mouth as needed. Yes Provider, Historical   aspirin (ASPIRIN) 325 mg tablet Take 325 mg by mouth daily. Yes Provider, Historical     Patient Active Problem List   Diagnosis Code    Hypercholesteremia E78.00    Hypertension I10    CAD (coronary artery disease) I25.10    Tobacco use disorder F17.200    Alcohol abuse F10.10    History of alcoholism ([address] F10.21    Hypertrophy (benign) of prostate N40.0    Mixed hyperlipidemia E78.2    Advance directive discussed with patient Z71.89     Patient Active Problem List    Diagnosis Date Noted    Advance directive discussed with patient 12/06/2016    History of alcoholism ([facility] [address] [address]     Hypertrophy (benign) of prostate     Mixed hyperlipidemia     Tobacco use disorder 09/24/2012    Alcohol abuse 09/24/2012    CAD (coronary artery disease)     Hypercholesteremia 07/11/2012    Hypertension 07/11/2012     Current Outpatient Medications   Medication Sig Dispense Refill    ergocalciferol (ERGOCALCIFEROL) 1,250 mcg (50,000 unit) capsule Take 1 Cap by mouth every seven (7) days. Indications: vitamin D deficiency (high dose therapy) 12 Cap 1    pravastatin (PRAVACHOL) 80 mg tablet TAKE 1 TABLET BY MOUTH EVERY DAY 30 Tab 4    isosorbide mononitrate ER (IMDUR) 30 mg tablet TAKE 1 TABLET BY MOUTH EVERY DAY 30 Tab 4    metoprolol succinate (TOPROL-XL) 200 mg XL tablet TAKE 1 TABLET BY MOUTH EVERY DAY 30 Tab 4    amLODIPine (NORVASC) 10 mg tablet TAKE 1 TABLET BY MOUTH ONCE DAILY FOR HIGH BLOOD PRESSURE 30 Tab 4    Blood Pressure Test Kit-Medium kit 1 Kit by Does Not Apply route two (2) times a day. 1 Kit 0    spironolactone (ALDACTONE) 25 mg tablet TAKE 1 TABLET BY MOUTH TWICE A DAY 60 Tab 3    omega-3 fatty acids-vitamin e (FISH OIL) 1,000 mg Cap Take 1 capsule by mouth as needed.  aspirin (ASPIRIN) 325 mg tablet Take 325 mg by mouth daily.          Allergies   Allergen Reactions    Lisinopril Anaphylaxis and Swelling     Swelling of tongue,  angioedema     Past Medical History:   Diagnosis Date    CAD (coronary artery disease)     Cardiac catheterization 03/23/2001    RCA patent. m/dRPDA 55%. LM patent. LAD patent. CX patent. oOMB 75%. LVEDP 8-10 mmHg. EF 50%. Global hypk. Patent bilateral renal arteries.  Cardiac echocardiogram 10/24/2012    EF 55-60%. No WMA. Mild conc LVH. Gr 1 DDfx. RVSP 20-25 mmHg. Mild LAE. Mild ADAM. Possible sm pericardial effusion but likely fat pad.  Cardiac nuclear imaging test, low risk 10/24/2012    No ischemia or prior infarction. EF 58%. Normal EKG on max EST. Ex time 6 min. Low risk.  History of alcoholism ([facility]     Personal history      Hypercholesteremia     Hypertension     Hypertrophy (benign) of prostate     Without urinary obstruction and other lower urinary tract symptoms (LUTS)    Mixed hyperlipidemia        ROS    Constitutional: No apparent distress noted  General- negative for fever, chills or fatigue  Eyes- negative visual changes  CV- denies chest pain, palpitation  Pul: negative cough or SOB  GI: negative nausea, flank pain, diarrhea, constipation  Urinary:- No dysuria or polyuria  MS- negative myalgia, negative joint pain  Neuro- negative headache, dizziness or weakness  Skin- negative for rashes or lesions. Psych- denies any anxiety or depression    Objective:   No flowsheet data found.      [INSTRUCTIONS:  \"[x]\" Indicates a positive item  \"[]\" Indicates a negative item  -- DELETE ALL ITEMS NOT EXAMINED]    Constitutional: [x] Appears well-developed and well-nourished [x] No apparent distress      [] Abnormal -     Mental status: [x] Alert and awake  [x] Oriented to person/place/time [x] Able to follow commands    [] Abnormal -     Eyes:   EOM    [x]  Normal    [] Abnormal -   Sclera  [x]  Normal    [] Abnormal -          Discharge [x]  None visible   [] Abnormal -     HENT: [x] Normocephalic, atraumatic  [] Abnormal -   [x] Mouth/Throat: Mucous membranes are moist    External Ears [x] Normal  [] Abnormal -    Neck: [x] No visualized mass [] Abnormal -     Pulmonary/Chest: [x] Respiratory effort normal   [x] No visualized signs of difficulty breathing or respiratory distress        [] Abnormal -      Musculoskeletal:   [x] Normal gait with no signs of ataxia         [x] Normal range of motion of neck        [] Abnormal -     Neurological:        [x] No Facial Asymmetry (Cranial nerve 7 motor function) (limited exam due to video visit)          [x] No gaze palsy        [] Abnormal -          Skin:        [x] No significant exanthematous lesions or discoloration noted on facial skin         [] Abnormal -            Psychiatric:       [x] Normal Affect [] Abnormal -        [x] No Hallucinations    Other pertinent observable physical exam findings:-        We discussed the expected course, resolution and complications of the diagnosis(es) in detail. Medication risks, benefits, costs, interactions, and alternatives were discussed as indicated. I advised him to contact the office if his condition worsens, changes or fails to improve as anticipated. He expressed understanding with the diagnosis(es) and plan. Nickolas Elizabeth, who was evaluated through a patient-initiated, synchronous (real-time) audio-video encounter, and/or his healthcare decision maker, is aware that it is a billable service, with coverage as determined by his insurance carrier. He provided verbal consent to proceed: Yes, and patient identification was verified. It was conducted pursuant to the emergency declaration under the [address] [place] [address] and the [employer]. A caregiver was present when appropriate. Ability to conduct physical exam was limited. I was at home. The patient was at home.       Laila Palomo NP

## 2020-12-18 DIAGNOSIS — I10 ESSENTIAL HYPERTENSION: ICD-10-CM

## 2020-12-20 RX ORDER — PRAVASTATIN SODIUM 80 MG/1
TABLET ORAL
Qty: 90 TAB | Refills: 1 | Status: SHIPPED | OUTPATIENT
Start: 2020-12-20 | End: 2021-06-24

## 2020-12-20 RX ORDER — METOPROLOL SUCCINATE 200 MG/1
TABLET, EXTENDED RELEASE ORAL
Qty: 90 TAB | Refills: 1 | Status: SHIPPED | OUTPATIENT
Start: 2020-12-20 | End: 2021-06-24

## 2020-12-20 RX ORDER — ISOSORBIDE MONONITRATE 30 MG/1
TABLET, EXTENDED RELEASE ORAL
Qty: 90 TAB | Refills: 1 | Status: SHIPPED | OUTPATIENT
Start: 2020-12-20 | End: 2021-06-24

## 2020-12-20 RX ORDER — AMLODIPINE BESYLATE 10 MG/1
TABLET ORAL
Qty: 90 TAB | Refills: 1 | Status: SHIPPED | OUTPATIENT
Start: 2020-12-20 | End: 2021-06-27

## 2020-12-22 DIAGNOSIS — I10 ESSENTIAL HYPERTENSION: ICD-10-CM

## 2020-12-22 RX ORDER — SPIRONOLACTONE 25 MG/1
TABLET ORAL
Qty: 180 TAB | Refills: 1 | Status: SHIPPED | OUTPATIENT
Start: 2020-12-22 | End: 2021-06-24

## 2021-02-10 ENCOUNTER — HOSPITAL ENCOUNTER (OUTPATIENT)
Dept: LAB | Age: 71
Discharge: HOME | End: 2021-02-10
Payer: MEDICARE

## 2021-02-10 ENCOUNTER — OFFICE VISIT (OUTPATIENT)
Dept: FAMILY MEDICINE CLINIC | Age: 71
End: 2021-02-10
Payer: MEDICARE

## 2021-02-10 VITALS
HEART RATE: 63 BPM | BODY MASS INDEX: 27.16 KG/M2 | OXYGEN SATURATION: 99 % | TEMPERATURE: 98 F | SYSTOLIC BLOOD PRESSURE: 136 MMHG | HEIGHT: 71 IN | RESPIRATION RATE: 16 BRPM | WEIGHT: 194 LBS | DIASTOLIC BLOOD PRESSURE: 65 MMHG

## 2021-02-10 DIAGNOSIS — I10 ESSENTIAL HYPERTENSION: ICD-10-CM

## 2021-02-10 DIAGNOSIS — I10 ESSENTIAL HYPERTENSION: Primary | ICD-10-CM

## 2021-02-10 DIAGNOSIS — E78.2 MIXED HYPERLIPIDEMIA: ICD-10-CM

## 2021-02-10 DIAGNOSIS — Z72.0 TOBACCO ABUSE: ICD-10-CM

## 2021-02-10 LAB
BASOPHILS # BLD: 0 K/UL (ref 0–0.1)
BASOPHILS NFR BLD: 1 % (ref 0–2)
DIFFERENTIAL METHOD BLD: ABNORMAL
EOSINOPHIL # BLD: 0.1 K/UL (ref 0–0.4)
EOSINOPHIL NFR BLD: 2 % (ref 0–5)
ERYTHROCYTE [DISTWIDTH] IN BLOOD BY AUTOMATED COUNT: 15.4 % (ref 11.6–14.5)
HCT VFR BLD AUTO: 41 % (ref 36–48)
HGB BLD-MCNC: 13 G/DL (ref 13–16)
LYMPHOCYTES # BLD: 1.7 K/UL (ref 0.9–3.6)
LYMPHOCYTES NFR BLD: 36 % (ref 21–52)
MCH RBC QN AUTO: 28.7 PG (ref 24–34)
MCHC RBC AUTO-ENTMCNC: 31.7 G/DL (ref 31–37)
MCV RBC AUTO: 90.5 FL (ref 74–97)
MONOCYTES # BLD: 0.8 K/UL (ref 0.05–1.2)
MONOCYTES NFR BLD: 17 % (ref 3–10)
NEUTS SEG # BLD: 2.1 K/UL (ref 1.8–8)
NEUTS SEG NFR BLD: 44 % (ref 40–73)
PLATELET # BLD AUTO: 156 K/UL (ref 135–420)
PMV BLD AUTO: 13.3 FL (ref 9.2–11.8)
RBC # BLD AUTO: 4.53 M/UL (ref 4.7–5.5)
WBC # BLD AUTO: 4.7 K/UL (ref 4.6–13.2)

## 2021-02-10 PROCEDURE — G8427 DOCREV CUR MEDS BY ELIG CLIN: HCPCS | Performed by: NURSE PRACTITIONER

## 2021-02-10 PROCEDURE — 3017F COLORECTAL CA SCREEN DOC REV: CPT | Performed by: NURSE PRACTITIONER

## 2021-02-10 PROCEDURE — G8752 SYS BP LESS 140: HCPCS | Performed by: NURSE PRACTITIONER

## 2021-02-10 PROCEDURE — G8536 NO DOC ELDER MAL SCRN: HCPCS | Performed by: NURSE PRACTITIONER

## 2021-02-10 PROCEDURE — 85025 COMPLETE CBC W/AUTO DIFF WBC: CPT

## 2021-02-10 PROCEDURE — 99213 OFFICE O/P EST LOW 20 MIN: CPT | Performed by: NURSE PRACTITIONER

## 2021-02-10 PROCEDURE — G8432 DEP SCR NOT DOC, RNG: HCPCS | Performed by: NURSE PRACTITIONER

## 2021-02-10 PROCEDURE — 36415 COLL VENOUS BLD VENIPUNCTURE: CPT

## 2021-02-10 PROCEDURE — 1101F PT FALLS ASSESS-DOCD LE1/YR: CPT | Performed by: NURSE PRACTITIONER

## 2021-02-10 PROCEDURE — G8419 CALC BMI OUT NRM PARAM NOF/U: HCPCS | Performed by: NURSE PRACTITIONER

## 2021-02-10 PROCEDURE — G0463 HOSPITAL OUTPT CLINIC VISIT: HCPCS | Performed by: NURSE PRACTITIONER

## 2021-02-10 PROCEDURE — G8754 DIAS BP LESS 90: HCPCS | Performed by: NURSE PRACTITIONER

## 2021-02-10 NOTE — PROGRESS NOTES
Sebastian Vergara is a 79 y.o. male presenting today for Hypertension (follow-up)  . HPI:  Sebastian Vergara presents to the office today for hypertension hyperlipidemia follow-up care. He notes that he feels well today and denies any cough, fever, loss of smell or taste or GI upset. Hypertension-his BP today is 136/65. He is compliant with the medication treatment plan and is up-to-date with his labs. He is not working on lifestyle modifications and discussed his cardiac risk to include tobacco and alcohol abuse. Patient notes that he has a drink occasionally but has slowed down since July, 2020. He admits he smokes about 2 packs of cigarettes per day. He denies any coughing. Hyperlipidemia-patient last lipid panel was September 18, 2020. His cholesterol was 135, his HDL was 39 and LDL 80.4. He is prescribed pravastatin 80 mg tablet daily. He denies any myalgia. ROS    ROS:  History obtained from the patient intake forms which are reviewed with the patient  · General: negative for - chills, fever, weight changes or malaise  · HEENT: no sore throat, nasal congestion, vision problems or ear problems  · Respiratory: no cough, shortness of breath, or wheezing  · Cardiovascular: no chest pain, palpitations, or dyspnea on exertion  · Gastrointestinal: no abdominal pain, N/V, change in bowel habits, or black or bloody stools  · Musculoskeletal: no back pain, joint pain, joint stiffness, muscle pain or muscle weakness  · Neurological: no numbness, tingling, headache or dizziness  · Endo:  No polyuria or polydipsia. · : no hematuria, dysuria, frequency, hesitancy, or nocturia.     · Psychological: negative for - anxiety, depression, sleep disturbances, suicidal or homicidal ideations    Allergies   Allergen Reactions    Lisinopril Anaphylaxis and Swelling     Swelling of tongue,  angioedema       Current Outpatient Medications   Medication Sig Dispense Refill    spironolactone (ALDACTONE) 25 mg tablet TAKE 1 TABLET BY MOUTH TWICE A  Tab 1    pravastatin (PRAVACHOL) 80 mg tablet TAKE 1 TABLET BY MOUTH EVERY DAY 90 Tab 1    isosorbide mononitrate ER (IMDUR) 30 mg tablet TAKE 1 TABLET BY MOUTH EVERY DAY 90 Tab 1    metoprolol succinate (TOPROL-XL) 200 mg XL tablet TAKE 1 TABLET BY MOUTH EVERY DAY 90 Tab 1    amLODIPine (NORVASC) 10 mg tablet TAKE 1 TABLET BY MOUTH ONCE DAILY FOR HIGH BLOOD PRESSURE 90 Tab 1    ergocalciferol (ERGOCALCIFEROL) 1,250 mcg (50,000 unit) capsule Take 1 Cap by mouth every seven (7) days. Indications: vitamin D deficiency (high dose therapy) 12 Cap 1    Blood Pressure Test Kit-Medium kit 1 Kit by Does Not Apply route two (2) times a day. 1 Kit 0    aspirin (ASPIRIN) 325 mg tablet Take 325 mg by mouth daily.  omega-3 fatty acids-vitamin e (FISH OIL) 1,000 mg Cap Take 1 capsule by mouth as needed. Past Medical History:   Diagnosis Date    CAD (coronary artery disease)     Cardiac catheterization 03/23/2001    RCA patent. m/dRPDA 55%. LM patent. LAD patent. CX patent. oOMB 75%. LVEDP 8-10 mmHg. EF 50%. Global hypk. Patent bilateral renal arteries.  Cardiac echocardiogram 10/24/2012    EF 55-60%. No WMA. Mild conc LVH. Gr 1 DDfx. RVSP 20-25 mmHg. Mild LAE. Mild ADAM. Possible sm pericardial effusion but likely fat pad.  Cardiac nuclear imaging test, low risk 10/24/2012    No ischemia or prior infarction. EF 58%. Normal EKG on max EST. Ex time 6 min. Low risk.     History of [address]ism ([facility] [address]     Personal history      Hypercholesteremia     Hypertension     Hypertrophy (benign) of prostate     Without urinary obstruction and other lower urinary tract symptoms (LUTS)    Mixed hyperlipidemia        Past Surgical History:   Procedure Laterality Date    HX COLONOSCOPY  10/2015    neg; h/o polyps    HX HEART CATHETERIZATION  03/23/2001    HX OTHER SURGICAL      sgy to correct club foot       Social History     Socioeconomic History    Marital status: SINGLE     Spouse name: Not on file    Number of children: Not on file    Years of education: Not on file    Highest education level: Not on file   Occupational History    Not on file   Social Needs    Financial resource strain: Not on file    Food insecurity     Worry: Not on file     Inability: Not on file    Transportation needs     Medical: Not on file     Non-medical: Not on file   Tobacco Use    Smoking status: Current Every Day Smoker     Packs/day: 2.50    Smokeless tobacco: Never Used   Substance and Sexual Activity    Alcohol use: Yes     Comment: 10 beers a day    Drug use: No    Sexual activity: Not Currently     Partners: Female   Lifestyle    Physical activity     Days per week: Not on file     Minutes per session: Not on file    Stress: Not on file   Relationships    Social connections     Talks on phone: Not on file     Gets together: Not on file     Attends Congregational service: Not on file     Active member of club or organization: Not on file     Attends meetings of clubs or organizations: Not on file     Relationship status: Not on file    Intimate partner violence     Fear of current or ex partner: Not on file     Emotionally abused: Not on file     Physically abused: Not on file     Forced sexual activity: Not on file   Other Topics Concern    Not on file   Social History Narrative    Not on file       Vitals:    02/10/21 0946   BP: 136/65   Pulse: 63   Resp: 16   Temp: 98 °F (36.7 °C)   TempSrc: Oral   SpO2: 99%   Weight: 194 lb (88 kg)   Height: 5' 11\" (1.803 m)   PainSc:   0 - No pain       Physical Exam  Vitals signs and nursing note reviewed. Constitutional:       Appearance: Normal appearance. HENT:      Right Ear: There is impacted cerumen (soft). Left Ear: Tympanic membrane normal.   Cardiovascular:      Rate and Rhythm: Normal rate and regular rhythm. Pulses: Normal pulses. Heart sounds: Normal heart sounds.    Pulmonary:      Effort: Pulmonary effort is normal.      Breath sounds: Normal breath sounds. Abdominal:      General: Bowel sounds are normal.      Palpations: Abdomen is soft. Musculoskeletal:         General: No swelling or deformity. Skin:     General: Skin is warm and dry. Neurological:      Mental Status: He is alert. No visits with results within 3 Month(s) from this visit. Latest known visit with results is:   Hospital Outpatient Visit on 09/18/2020   Component Date Value Ref Range Status    WBC 09/18/2020 4.9  4.6 - 13.2 K/uL Final    RBC 09/18/2020 4.18* 4.70 - 5.50 M/uL Final    HGB 09/18/2020 12.1* 13.0 - 16.0 g/dL Final    HCT 09/18/2020 37.7  36.0 - 48.0 % Final    MCV 09/18/2020 90.2  74.0 - 97.0 FL Final    MCH 09/18/2020 28.9  24.0 - 34.0 PG Final    MCHC 09/18/2020 32.1  31.0 - 37.0 g/dL Final    RDW 09/18/2020 16.0* 11.6 - 14.5 % Final    PLATELET 61/50/4326 680  135 - 420 K/uL Final    MPV 09/18/2020 12.9* 9.2 - 11.8 FL Final    NEUTROPHILS 09/18/2020 49  40 - 73 % Final    LYMPHOCYTES 09/18/2020 36  21 - 52 % Final    MONOCYTES 09/18/2020 12* 3 - 10 % Final    EOSINOPHILS 09/18/2020 2  0 - 5 % Final    BASOPHILS 09/18/2020 1  0 - 2 % Final    ABS. NEUTROPHILS 09/18/2020 2.4  1.8 - 8.0 K/UL Final    ABS. LYMPHOCYTES 09/18/2020 1.8  0.9 - 3.6 K/UL Final    ABS. MONOCYTES 09/18/2020 0.6  0.05 - 1.2 K/UL Final    ABS. EOSINOPHILS 09/18/2020 0.1  0.0 - 0.4 K/UL Final    ABS. BASOPHILS 09/18/2020 0.1  0.0 - 0.1 K/UL Final    DF 09/18/2020 AUTOMATED    Final    LIPID PROFILE 09/18/2020        Final    Cholesterol, total 09/18/2020 135  <200 MG/DL Final    Triglyceride 09/18/2020 78  <150 MG/DL Final    Comment: The drugs N-acetylcysteine (NAC) and  Metamiszole have been found to cause falsely  low results in this chemical assay. Please  be sure to submit blood samples obtained  BEFORE administration of either of these  drugs to assure correct results.       HDL Cholesterol 09/18/2020 39* 40 - 60 MG/DL Final    LDL, calculated 09/18/2020 80.4  0 - 100 MG/DL Final    VLDL, calculated 09/18/2020 15.6  MG/DL Final    CHOL/HDL Ratio 09/18/2020 3.5  0 - 5.0   Final    Sodium 09/18/2020 142  136 - 145 mmol/L Final    Potassium 09/18/2020 3.8  3.5 - 5.5 mmol/L Final    Chloride 09/18/2020 111  100 - 111 mmol/L Final    CO2 09/18/2020 26  21 - 32 mmol/L Final    Anion gap 09/18/2020 5  3.0 - 18 mmol/L Final    Glucose 09/18/2020 78  74 - 99 mg/dL Final    BUN 09/18/2020 11  7.0 - 18 MG/DL Final    Creatinine 09/18/2020 0.67  0.6 - 1.3 MG/DL Final    BUN/Creatinine ratio 09/18/2020 16  12 - 20   Final    GFR est AA 09/18/2020 >60  >60 ml/min/1.73m2 Final    GFR est non-AA 09/18/2020 >60  >60 ml/min/1.73m2 Final    Comment: (NOTE)  Estimated GFR is calculated using the Modification of Diet in Renal   Disease (MDRD) Study equation, reported for both  Americans   (GFRAA) and non- Americans (GFRNA), and normalized to 1.73m2   body surface area. The physician must decide which value applies to   the patient. The MDRD study equation should only be used in   individuals age 25 or older. It has not been validated for the   following: pregnant women, patients with serious comorbid conditions,   or on certain medications, or persons with extremes of body size,   muscle mass, or nutritional status.  Calcium 09/18/2020 9.1  8.5 - 10.1 MG/DL Final    Bilirubin, total 09/18/2020 0.5  0.2 - 1.0 MG/DL Final    ALT (SGPT) 09/18/2020 13* 16 - 61 U/L Final    AST (SGOT) 09/18/2020 25  10 - 38 U/L Final    Alk.  phosphatase 09/18/2020 71  45 - 117 U/L Final    Protein, total 09/18/2020 7.5  6.4 - 8.2 g/dL Final    Albumin 09/18/2020 4.0  3.4 - 5.0 g/dL Final    Globulin 09/18/2020 3.5  2.0 - 4.0 g/dL Final    A-G Ratio 09/18/2020 1.1  0.8 - 1.7   Final    Vitamin D 25-Hydroxy 09/18/2020 37.1  30 - 100 ng/mL Final    Comment: (NOTE)  Deficiency               <20 ng/mL  Insufficiency          20-30 ng/mL  Sufficient             ng/mL  Possible toxicity       >100 ng/mL    The Method used is Siemens Advia Centaur currently standardized to a   Center of Disease Control and Prevention (CDC) certified reference   [facility]. Samples containing fluorescein dye can produce falsely   elevated values when tested with the ADVIA Centaur Vitamin D Assay. It is recommended that results in the toxic range, >100 ng/mL, be   retested 72 hours post fluorescein exposure.  Prostate Specific Ag 09/18/2020 0.5  0.0 - 4.0 ng/mL Final       .No results found for any visits on 02/10/21. Assessment / Plan:      ICD-10-CM ICD-9-CM    1. Essential hypertension  I10 401.9 CBC WITH AUTOMATED DIFF   2. Tobacco abuse  Z72.0 305.1 CT LOW DOSE LUNG CANCER SCREENING   3. Mixed hyperlipidemia  E78.2 272.2          Follow-up and Dispositions    · Return in about 4 months (around 6/10/2021). CBC today  CT low-dose lung cancer screening ordered  Hypertension-no change to current treatment plan    I asked the patient if he  had any questions and answered his  questions. The patient stated that he understands the treatment plan and agrees with the treatment plan    This document was created with a voice activated dictation system and may contain transcription errors.

## 2021-02-26 ENCOUNTER — NURSE NAVIGATOR (OUTPATIENT)
Dept: OTHER | Age: 71
End: 2021-02-26

## 2021-02-26 NOTE — NURSE NAVIGATOR
Referring Provider: Su Sauceda NP      Lung Cancer Risk Profile:   Age: 70  Gender: Male  Height: 71\"  Weight: 194#    Smoking History:  Smoking Status: current use  # years smokin  # years quit: 0  Packs/day: 1.5  Pack years: 82.5    Patient discussed smoking cessation with PCP: Yes, per patient report    Patient participated in shared decision making process with PCP: Unknown    Patient is currently experiencing symptoms: No, per patient report    If yes what symptoms:     Co-Morbidities:  CAD    Cancer History:      Additional Risk Factors:     Exposure to second hand smoke      Patient's smoking history discussed via phone. Patient meets LDCT lung cancer screening criteria. Appointment scheduled for 3/3/2021 verified with Mr. Peterson.      TAB BarnardN, RN, OCN  Lung Health Nurse Navigator

## 2021-03-03 ENCOUNTER — HOSPITAL ENCOUNTER (OUTPATIENT)
Dept: CT IMAGING | Age: 71
Discharge: HOME | End: 2021-03-03
Attending: NURSE PRACTITIONER
Payer: MEDICARE

## 2021-03-03 DIAGNOSIS — Z72.0 TOBACCO ABUSE: ICD-10-CM

## 2021-03-03 PROCEDURE — 71271 CT THORAX LUNG CANCER SCR C-: CPT

## 2021-03-10 NOTE — PROGRESS NOTES
Please let patient know the xray results showed small benign appearing nodule.   We will repeat screening in 12 months

## 2021-03-11 ENCOUNTER — TELEPHONE (OUTPATIENT)
Dept: FAMILY MEDICINE CLINIC | Age: 71
End: 2021-03-11

## 2021-03-15 DIAGNOSIS — E55.9 VITAMIN D DEFICIENCY: ICD-10-CM

## 2021-03-15 RX ORDER — ERGOCALCIFEROL 1.25 MG/1
50000 CAPSULE ORAL
Qty: 12 CAP | Refills: 1 | Status: SHIPPED | OUTPATIENT
Start: 2021-03-15 | End: 2021-06-09 | Stop reason: SDUPTHER

## 2021-04-28 ENCOUNTER — OFFICE VISIT (OUTPATIENT)
Dept: CARDIOLOGY CLINIC | Age: 71
End: 2021-04-28
Payer: MEDICARE

## 2021-04-28 VITALS
SYSTOLIC BLOOD PRESSURE: 130 MMHG | WEIGHT: 194 LBS | DIASTOLIC BLOOD PRESSURE: 74 MMHG | HEIGHT: 71 IN | OXYGEN SATURATION: 99 % | HEART RATE: 61 BPM | BODY MASS INDEX: 27.16 KG/M2

## 2021-04-28 DIAGNOSIS — I25.119 CORONARY ARTERY DISEASE INVOLVING NATIVE CORONARY ARTERY OF NATIVE HEART WITH ANGINA PECTORIS (HCC): Primary | ICD-10-CM

## 2021-04-28 PROCEDURE — G8752 SYS BP LESS 140: HCPCS | Performed by: INTERNAL MEDICINE

## 2021-04-28 PROCEDURE — G8754 DIAS BP LESS 90: HCPCS | Performed by: INTERNAL MEDICINE

## 2021-04-28 PROCEDURE — 1101F PT FALLS ASSESS-DOCD LE1/YR: CPT | Performed by: INTERNAL MEDICINE

## 2021-04-28 PROCEDURE — G8419 CALC BMI OUT NRM PARAM NOF/U: HCPCS | Performed by: INTERNAL MEDICINE

## 2021-04-28 PROCEDURE — 99214 OFFICE O/P EST MOD 30 MIN: CPT | Performed by: INTERNAL MEDICINE

## 2021-04-28 PROCEDURE — 3017F COLORECTAL CA SCREEN DOC REV: CPT | Performed by: INTERNAL MEDICINE

## 2021-04-28 PROCEDURE — G8510 SCR DEP NEG, NO PLAN REQD: HCPCS | Performed by: INTERNAL MEDICINE

## 2021-04-28 PROCEDURE — G8536 NO DOC ELDER MAL SCRN: HCPCS | Performed by: INTERNAL MEDICINE

## 2021-04-28 PROCEDURE — G8427 DOCREV CUR MEDS BY ELIG CLIN: HCPCS | Performed by: INTERNAL MEDICINE

## 2021-04-28 PROCEDURE — 93000 ELECTROCARDIOGRAM COMPLETE: CPT | Performed by: INTERNAL MEDICINE

## 2021-04-28 NOTE — PROGRESS NOTES
Jarvis Tate presents today for   Chief Complaint   Patient presents with    Follow-up     9 month follow up       Jarvis Tate preferred language for health care discussion is english/other. Is someone accompanying this pt? no    Is the patient using any DME equipment during [address]? no    Depression Screening:  3 most recent PHQ Screens 4/28/2021   Little interest or pleasure in doing things Not at all   Feeling down, depressed, irritable, or hopeless Not at all   Total Score PHQ 2 0       Learning Assessment:  Learning Assessment 4/28/2021   PRIMARY LEARNER Patient   CO-LEARNER CAREGIVER -   PRIMARY LANGUAGE ENGLISH   LEARNER PREFERENCE PRIMARY DEMONSTRATION   ANSWERED BY patient   RELATIONSHIP SELF       Abuse Screening:  Abuse Screening Questionnaire 4/28/2021   Do you ever feel afraid of your partner? N   Are you in a relationship with someone who physically or mentally threatens you? N   Is it safe for you to go home? Y       Fall Risk  Fall Risk Assessment, last 12 mths 4/28/2021   Able to walk? Yes   Fall in past 12 months? 0   Do you feel unsteady? 0   Are you worried about falling 0           Pt currently taking Anticoagulant therapy? no    Pt currently taking Antiplatelet therapy ?  mg once a day      Coordination of Care:  1. Have you been to the ER, urgent care clinic since your last visit? Hospitalized since your last visit? no    2. Have you seen or consulted any other health care providers outside of the [address] [place] [address] since your last visit? Include any pap smears or colon screening.  no

## 2021-04-28 NOTE — PROGRESS NOTES
HISTORY OF PRESENT ILLNESS  Neftaly Stanley is a 70 y.o. male. ASSESSMENT and PLAN    Mr. Rachele Patel has history of small vessel disease in his coronary circulation. He has done well on medical therapy. Unfortunately, he still smokes cigarettes about one pack per day. He was a heavy drinker in the past; however, he has minimized to about 1-2 drinks every 2 to 3 weeks. Because of erectile dysfunction, he had taken Cialis in the past. He was able to come off long-acting nitrates by cutting back on his alcohol intake.  CAD:    He has known history of small vessel disease. Clinically he remains stable.  BP:    Well controlled.  Rhythm:    Stable sinus.  CHF:    Currently, there is no evidence of decompensated CHF noted.  Weight:    His weight today is 194 pounds. His baseline weight is 190 pounds. His current weight is acceptable.  Cholesterol:  Target LDL<70. Remains on lovastatin 80, niacin 500 twice daily.  Tobacco:    Unfortunately, he still smokes about half a pack daily. Again, strong encouragement was given for cessation.  Anticoagulant:  Remains on ASA. I will see him back annually. Thank you. Encounter Diagnoses   Name Primary?  Coronary artery disease involving native coronary artery of native heart with angina pectoris ([address] Yes     current treatment plan is effective, no change in therapy  lab results and schedule of future lab studies reviewed with patient  reviewed diet, exercise and weight control  very strongly urged to quit smoking to reduce cardiovascular risk  cardiovascular risk and specific lipid/LDL goals reviewed  use of aspirin to prevent MI and TIA's discussed      HPI   Today, Mr. Katja Schreiber has no complaints of chest pains, increased shortness of breath or decreased exercise capacity. Unfortunately, he still smokes cigarettes. He denies any active alcohol use. He denies any orthopnea or PND. He denies any palpitations or dizziness.     Review of Systems   Respiratory: Negative for shortness of breath. Cardiovascular: Negative for chest pain, palpitations, orthopnea, claudication, leg swelling and PND. All other systems reviewed and are negative. Physical Exam  Vitals signs and nursing note reviewed. Constitutional:       Appearance: Normal appearance. HENT:      Head: Normocephalic. Eyes:      Conjunctiva/sclera: Conjunctivae normal.   Neck:      Musculoskeletal: No neck rigidity. Cardiovascular:      Rate and Rhythm: Normal rate and regular rhythm. Pulmonary:      Breath sounds: Normal breath sounds. Abdominal:      Palpations: Abdomen is soft. Musculoskeletal:         General: No swelling. Skin:     General: Skin is warm and dry. Neurological:      General: No focal deficit present. Mental Status: He is alert and oriented to person, place, and time. Psychiatric:         Mood and Affect: Mood normal.         Behavior: Behavior normal.         PCP: Russ Meza NP    Past Medical History:   Diagnosis Date    CAD (coronary artery disease)     Cardiac catheterization 03/23/2001    RCA patent. m/dRPDA 55%. LM patent. LAD patent. CX patent. oOMB 75%. LVEDP 8-10 mmHg. EF 50%. Global hypk. Patent bilateral renal arteries.  Cardiac echocardiogram 10/24/2012    EF 55-60%. No WMA. Mild conc LVH. Gr 1 DDfx. RVSP 20-25 mmHg. Mild LAE. Mild ADAM. Possible sm pericardial effusion but likely fat pad.  Cardiac nuclear imaging test, low risk 10/24/2012    No ischemia or prior infarction. EF 58%. Normal EKG on max EST. Ex time 6 min. Low risk.    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       Current Outpatient Medications   Medication Sig Dispense Refill    ergocalciferol (ERGOCALCIFEROL) 1,250 mcg (50,000 unit) capsule TAKE 1 CAP BY MOUTH EVERY SEVEN (7) DAYS. INDICATIONS: VITAMIN D DEFICIENCY (HIGH DOSE THERAPY) 12 Cap 1    spironolactone (ALDACTONE) 25 mg tablet TAKE 1 TABLET BY MOUTH TWICE A  Tab 1    pravastatin (PRAVACHOL) 80 mg tablet TAKE 1 TABLET BY MOUTH EVERY DAY 90 Tab 1    isosorbide mononitrate ER (IMDUR) 30 mg tablet TAKE 1 TABLET BY MOUTH EVERY DAY 90 Tab 1    metoprolol succinate (TOPROL-XL) 200 mg XL tablet TAKE 1 TABLET BY MOUTH EVERY DAY 90 Tab 1    amLODIPine (NORVASC) 10 mg tablet TAKE 1 TABLET BY MOUTH ONCE DAILY FOR HIGH BLOOD PRESSURE 90 Tab 1    Blood Pressure Test Kit-Medium kit 1 Kit by Does Not Apply route two (2) times a day. 1 Kit 0    omega-3 fatty acids-vitamin e (FISH OIL) 1,000 mg Cap Take 1 capsule by mouth as needed.  aspirin (ASPIRIN) 325 mg tablet Take 325 mg by mouth daily.            The patient has a family history of    Social History     Tobacco Use    Smoking status: Current Every Day Smoker     Packs/day: 2.50    Smokeless tobacco: Never Used   Substance Use Topics    Alcohol use: Yes     Comment: 10 beers a day    Drug use: No       Lab Results   Component Value Date/Time    Cholesterol, total 135 09/18/2020 03:47 PM    HDL Cholesterol 39 (L) 09/18/2020 03:47 PM    LDL, calculated 80.4 09/18/2020 03:47 PM    Triglyceride 78 09/18/2020 03:47 PM    CHOL/HDL Ratio 3.5 09/18/2020 03:47 PM        BP Readings from Last 3 Encounters:   04/28/21 130/74   02/10/21 136/65   07/21/20 124/66        Pulse Readings from Last 3 Encounters:   04/28/21 61   02/10/21 63   07/21/20 64       Wt Readings from Last 3 Encounters:   04/28/21 88 kg (194 lb)   02/10/21 88 kg (194 lb)   07/21/20 86.6 kg (191 lb)         EKG: unchanged from previous tracings, normal sinus rhythm, LVH by voltage criteria, repolarization changes.

## 2021-06-09 ENCOUNTER — OFFICE VISIT (OUTPATIENT)
Dept: FAMILY MEDICINE CLINIC | Age: 71
End: 2021-06-09
Payer: MEDICARE

## 2021-06-09 VITALS
OXYGEN SATURATION: 98 % | TEMPERATURE: 98 F | HEART RATE: 84 BPM | WEIGHT: 187 LBS | BODY MASS INDEX: 26.18 KG/M2 | HEIGHT: 71 IN | SYSTOLIC BLOOD PRESSURE: 142 MMHG | DIASTOLIC BLOOD PRESSURE: 84 MMHG | RESPIRATION RATE: 16 BRPM

## 2021-06-09 DIAGNOSIS — Z12.5 SCREENING FOR PROSTATE CANCER: ICD-10-CM

## 2021-06-09 DIAGNOSIS — E55.9 VITAMIN D DEFICIENCY: ICD-10-CM

## 2021-06-09 DIAGNOSIS — Z72.0 TOBACCO ABUSE: ICD-10-CM

## 2021-06-09 DIAGNOSIS — E78.2 MIXED HYPERLIPIDEMIA: ICD-10-CM

## 2021-06-09 DIAGNOSIS — I10 ESSENTIAL HYPERTENSION: Primary | ICD-10-CM

## 2021-06-09 PROCEDURE — G8428 CUR MEDS NOT DOCUMENT: HCPCS | Performed by: NURSE PRACTITIONER

## 2021-06-09 PROCEDURE — G8419 CALC BMI OUT NRM PARAM NOF/U: HCPCS | Performed by: NURSE PRACTITIONER

## 2021-06-09 PROCEDURE — 3017F COLORECTAL CA SCREEN DOC REV: CPT | Performed by: NURSE PRACTITIONER

## 2021-06-09 PROCEDURE — G8753 SYS BP > OR = 140: HCPCS | Performed by: NURSE PRACTITIONER

## 2021-06-09 PROCEDURE — G8536 NO DOC ELDER MAL SCRN: HCPCS | Performed by: NURSE PRACTITIONER

## 2021-06-09 PROCEDURE — 99214 OFFICE O/P EST MOD 30 MIN: CPT | Performed by: NURSE PRACTITIONER

## 2021-06-09 PROCEDURE — G8432 DEP SCR NOT DOC, RNG: HCPCS | Performed by: NURSE PRACTITIONER

## 2021-06-09 PROCEDURE — G8754 DIAS BP LESS 90: HCPCS | Performed by: NURSE PRACTITIONER

## 2021-06-09 PROCEDURE — 1101F PT FALLS ASSESS-DOCD LE1/YR: CPT | Performed by: NURSE PRACTITIONER

## 2021-06-09 NOTE — PROGRESS NOTES
Jarvis Tate is a 70 y.o. male presenting today for Hypertension (follow-up)  . HPI:  Jarvis Tate presents to the office today for hypertension hyperlipidemia follow-up care. He notes that he feels well today and denies any cough, fever, loss of smell or taste or GI upset. Hypertension-his BP today is 151/90. He forgot to take his BP medication today  He is compliant with the medication treatment plan and is up-to-date with his labs. He is not working on lifestyle modifications and discussed his cardiac risk to include tobacco and alcohol abuse. Patient notes that he has not had a drink since July, 2020. He admits he smokes about 2 packs of cigarettes per day. He denies any coughing. Hyperlipidemia-patient last lipid panel was September 18, 2020. His cholesterol was 135, his HDL was 39 and LDL 80.4. He is prescribed pravastatin 80 mg tablet daily. He denies any myalgia. ROS    ROS:  History obtained from the patient intake forms which are reviewed with the patient  · General: negative for - chills, fever, weight changes or malaise  · HEENT: no sore throat, nasal congestion, vision problems or ear problems  · Respiratory: no cough, shortness of breath, or wheezing  · Cardiovascular: no chest pain, palpitations, or dyspnea on exertion  · Gastrointestinal: no abdominal pain, N/V, change in bowel habits, or black or bloody stools  · Musculoskeletal: no back pain, joint pain, joint stiffness, muscle pain or muscle weakness  · Neurological: no numbness, tingling, headache or dizziness  · Endo:  No polyuria or polydipsia. · : no hematuria, dysuria, frequency, hesitancy, or nocturia.     · Psychological: negative for - anxiety, depression, sleep disturbances, suicidal or homicidal ideations    Allergies   Allergen Reactions    Lisinopril Anaphylaxis and Swelling     Swelling of tongue,  angioedema       Current Outpatient Medications   Medication Sig Dispense Refill    ergocalciferol (ERGOCALCIFEROL) 1,250 mcg (50,000 unit) capsule Take 1 Capsule by mouth every seven (7) days. Indications: vitamin D deficiency (high dose therapy) 12 Capsule 1    spironolactone (ALDACTONE) 25 mg tablet TAKE 1 TABLET BY MOUTH TWICE A  Tab 1    pravastatin (PRAVACHOL) 80 mg tablet TAKE 1 TABLET BY MOUTH EVERY DAY 90 Tab 1    isosorbide mononitrate ER (IMDUR) 30 mg tablet TAKE 1 TABLET BY MOUTH EVERY DAY 90 Tab 1    metoprolol succinate (TOPROL-XL) 200 mg XL tablet TAKE 1 TABLET BY MOUTH EVERY DAY 90 Tab 1    amLODIPine (NORVASC) 10 mg tablet TAKE 1 TABLET BY MOUTH ONCE DAILY FOR HIGH BLOOD PRESSURE 90 Tab 1    Blood Pressure Test Kit-Medium kit 1 Kit by Does Not Apply route two (2) times a day. 1 Kit 0    omega-3 fatty acids-vitamin e (FISH OIL) 1,000 mg Cap Take 1 capsule by mouth as needed.  aspirin (ASPIRIN) 325 mg tablet Take 325 mg by mouth daily. Past Medical History:   Diagnosis Date    CAD (coronary artery disease)     Cardiac catheterization 03/23/2001    RCA patent. m/dRPDA 55%. LM patent. LAD patent. CX patent. oOMB 75%. LVEDP 8-10 mmHg. EF 50%. Global hypk. Patent bilateral renal arteries.  Cardiac echocardiogram 10/24/2012    EF 55-60%. No WMA. Mild conc LVH. Gr 1 DDfx. RVSP 20-25 mmHg. Mild LAE. Mild ADAM. Possible sm pericardial effusion but likely fat pad.  Cardiac nuclear imaging test, low risk 10/24/2012    No ischemia or prior infarction. EF 58%. Normal EKG on max EST. Ex time 6 min. Low risk.     History of [address]ism ([facility] [address]     Personal history      Hypercholesteremia     Hypertension     Hypertrophy (benign) of prostate     Without urinary obstruction and other lower urinary tract symptoms (LUTS)    Mixed hyperlipidemia        Past Surgical History:   Procedure Laterality Date    HX COLONOSCOPY  10/2015    neg; h/o polyps    HX HEART CATHETERIZATION  03/23/2001    HX OTHER SURGICAL      sgy to correct club foot       Social History Socioeconomic History    Marital status: SINGLE     Spouse name: Not on file    Number of children: Not on file    Years of education: Not on file    Highest education level: Not on file   Occupational History    Not on file   Tobacco Use    Smoking status: Current Every Day Smoker     Packs/day: 2.50    Smokeless tobacco: Never Used   Substance and Sexual Activity    Alcohol use: Yes     Comment: 10 beers a day    Drug use: No    Sexual activity: Not Currently     Partners: Female   Other Topics Concern    Not on file   Social History Narrative    Not on file     Social Determinants of Health     Financial Resource Strain:     Difficulty of Paying Living Expenses:    Food Insecurity:     Worried About Running Out of Food in the Last Year:     [address] in the Last Year:    Transportation Needs:     Lack of Transportation (Medical):  Lack of Transportation (Non-Medical):    Physical Activity:     Days of Exercise per Week:     Minutes of Exercise per Session:    Stress:     Feeling of Stress :    Social Connections:     Frequency of Communication with Friends and Family:     Frequency of Social Gatherings with Friends and Family:     Attends [employer] Services:     Active Member of Clubs or Organizations:     Attends Club or Organization Meetings:     Marital Status:    Intimate Partner Violence:     Fear of Current or Ex-Partner:     Emotionally Abused:     Physically Abused:     Sexually Abused:        Vitals:    06/09/21 1055 06/09/21 1121   BP: (!) 151/90 (!) 142/84   Pulse: 84    Resp: 16    Temp: 98 °F (36.7 °C)    TempSrc: Oral    SpO2: 98%    Weight: 187 lb (84.8 kg)    Height: 5' 11\" (1.803 m)    PainSc:   0 - No pain        Physical Exam  Vitals and nursing note reviewed. Constitutional:       Appearance: Normal appearance. HENT:      Left Ear: Tympanic membrane normal.   Cardiovascular:      Rate and Rhythm: Normal rate and regular rhythm.       Pulses: Normal pulses. Heart sounds: Normal heart sounds. Pulmonary:      Effort: Pulmonary effort is normal.      Breath sounds: Normal breath sounds. Abdominal:      General: Bowel sounds are normal.      Palpations: Abdomen is soft. Musculoskeletal:         General: No swelling or deformity. Skin:     General: Skin is warm and dry. Neurological:      Mental Status: He is alert. No visits with results within 3 Month(s) from this visit. Latest known visit with results is:   Hospital Outpatient Visit on 02/10/2021   Component Date Value Ref Range Status    WBC 02/10/2021 4.7  4.6 - 13.2 K/uL Final    RBC 02/10/2021 4.53* 4.70 - 5.50 M/uL Final    HGB 02/10/2021 13.0  13.0 - 16.0 g/dL Final    HCT 02/10/2021 41.0  36.0 - 48.0 % Final    MCV 02/10/2021 90.5  74.0 - 97.0 FL Final    MCH 02/10/2021 28.7  24.0 - 34.0 PG Final    MCHC 02/10/2021 31.7  31.0 - 37.0 g/dL Final    RDW 02/10/2021 15.4* 11.6 - 14.5 % Final    PLATELET 06/15/4314 990  135 - 420 K/uL Final    MPV 02/10/2021 13.3* 9.2 - 11.8 FL Final    NEUTROPHILS 02/10/2021 44  40 - 73 % Final    LYMPHOCYTES 02/10/2021 36  21 - 52 % Final    MONOCYTES 02/10/2021 17* 3 - 10 % Final    EOSINOPHILS 02/10/2021 2  0 - 5 % Final    BASOPHILS 02/10/2021 1  0 - 2 % Final    ABS. NEUTROPHILS 02/10/2021 2.1  1.8 - 8.0 K/UL Final    ABS. LYMPHOCYTES 02/10/2021 1.7  0.9 - 3.6 K/UL Final    ABS. MONOCYTES 02/10/2021 0.8  0.05 - 1.2 K/UL Final    ABS. EOSINOPHILS 02/10/2021 0.1  0.0 - 0.4 K/UL Final    ABS. BASOPHILS 02/10/2021 0.0  0.0 - 0.1 K/UL Final    DF 02/10/2021 AUTOMATED    Final       .No results found for any visits on 06/09/21. Assessment / Plan:      ICD-10-CM ICD-9-CM    1. Essential hypertension  Y82 912.8 METABOLIC PANEL, COMPREHENSIVE      CBC WITH AUTOMATED DIFF   2. Vitamin D deficiency  E55.9 268.9 ergocalciferol (ERGOCALCIFEROL) 1,250 mcg (50,000 unit) capsule   3. Tobacco abuse  Z72.0 305.1    4.  Mixed hyperlipidemia E78.2 272.2 LIPID PANEL   5. Screening for prostate cancer  Z12.5 V76.44 PSA SCREENING (SCREENING)     Patient not ready to get colonoscopy secondary to Covid-19 pandemic  Hypertension-BP mildly elevated. Follow-up and Dispositions    · Return in about 4 months (around 10/9/2021). Hyperlipidemia-lipid panel ordered  Vitamin D deficiency prescription given  Fasting labs ordered  Negative for any side effects or adverse reactions to any prescriptions  No change to current treatment plan    I asked the patient if he  had any questions and answered his  questions. The patient stated that he understands the treatment plan and agrees with the treatment plan    This document was created with a voice activated dictation system and may contain transcription errors.

## 2021-06-09 NOTE — PROGRESS NOTES
Junior Bethea presents today for   Chief Complaint   Patient presents with    Hypertension     follow-up       Is someone accompanying this pt? no    Is the patient using any DME equipment during OV? no    Depression Screening:  3 most recent PHQ Screens 6/9/2021   Little interest or pleasure in doing things Not at all   Feeling down, depressed, irritable, or hopeless Not at all   Total Score PHQ 2 0       Learning Assessment:  Learning Assessment 4/28/2021   PRIMARY LEARNER Patient   CO-LEARNER CAREGIVER -   PRIMARY LANGUAGE ENGLISH   LEARNER PREFERENCE PRIMARY DEMONSTRATION   ANSWERED BY patient   RELATIONSHIP SELF       Abuse Screening:  Abuse Screening Questionnaire 4/28/2021   Do you ever feel afraid of your partner? N   Are you in a relationship with someone who physically or mentally threatens you? N   Is it safe for you to go home? Y       Fall Risk  Fall Risk Assessment, last 12 mths 6/9/2021   Able to walk? Yes   Fall in past 12 months? 0   Do you feel unsteady? 0   Are you worried about falling 0       Health Maintenance reviewed and discussed and ordered per Provider. Health Maintenance Due   Topic Date Due    COVID-19 Vaccine (1) Never done    DTaP/Tdap/Td series (1 - Tdap) Never done    Shingrix Vaccine Age 50> (1 of 2) Never done    Colorectal Cancer Screening Combo  10/28/2020   . Coordination of Care:  1. Have you been to the ER, urgent care clinic since your last visit? Hospitalized since your last visit? no    2. Have you seen or consulted any other health care providers outside of the [address] [place] [address] since your last visit? Include any pap smears or colon screening.  no

## 2021-06-22 RX ORDER — ERGOCALCIFEROL 1.25 MG/1
50000 CAPSULE ORAL
Qty: 12 CAPSULE | Refills: 1 | Status: SHIPPED | OUTPATIENT
Start: 2021-06-22 | End: 2021-10-05 | Stop reason: SDUPTHER

## 2021-06-24 DIAGNOSIS — I10 ESSENTIAL HYPERTENSION: ICD-10-CM

## 2021-06-24 RX ORDER — PRAVASTATIN SODIUM 80 MG/1
TABLET ORAL
Qty: 90 TABLET | Refills: 1 | Status: SHIPPED | OUTPATIENT
Start: 2021-06-24 | End: 2021-10-05 | Stop reason: SDUPTHER

## 2021-06-24 RX ORDER — ISOSORBIDE MONONITRATE 30 MG/1
TABLET, EXTENDED RELEASE ORAL
Qty: 90 TABLET | Refills: 1 | Status: SHIPPED | OUTPATIENT
Start: 2021-06-24 | End: 2022-07-07 | Stop reason: SDUPTHER

## 2021-06-24 RX ORDER — SPIRONOLACTONE 25 MG/1
TABLET ORAL
Qty: 180 TABLET | Refills: 1 | Status: SHIPPED | OUTPATIENT
Start: 2021-06-24 | End: 2021-10-05 | Stop reason: SDUPTHER

## 2021-06-24 RX ORDER — METOPROLOL SUCCINATE 200 MG/1
TABLET, EXTENDED RELEASE ORAL
Qty: 90 TABLET | Refills: 1 | Status: SHIPPED | OUTPATIENT
Start: 2021-06-24 | End: 2021-10-05 | Stop reason: SDUPTHER

## 2021-06-27 RX ORDER — AMLODIPINE BESYLATE 10 MG/1
TABLET ORAL
Qty: 90 TABLET | Refills: 1 | Status: SHIPPED | OUTPATIENT
Start: 2021-06-27 | End: 2021-10-05 | Stop reason: SDUPTHER

## 2021-10-05 ENCOUNTER — OFFICE VISIT (OUTPATIENT)
Dept: FAMILY MEDICINE CLINIC | Age: 71
End: 2021-10-05
Payer: MEDICARE

## 2021-10-05 VITALS
WEIGHT: 176 LBS | RESPIRATION RATE: 16 BRPM | DIASTOLIC BLOOD PRESSURE: 80 MMHG | HEIGHT: 71 IN | OXYGEN SATURATION: 97 % | BODY MASS INDEX: 24.64 KG/M2 | HEART RATE: 80 BPM | SYSTOLIC BLOOD PRESSURE: 160 MMHG | TEMPERATURE: 98.7 F

## 2021-10-05 DIAGNOSIS — Z00.00 MEDICARE ANNUAL WELLNESS VISIT, SUBSEQUENT: Primary | ICD-10-CM

## 2021-10-05 DIAGNOSIS — I10 ESSENTIAL HYPERTENSION: ICD-10-CM

## 2021-10-05 DIAGNOSIS — E55.9 VITAMIN D DEFICIENCY: ICD-10-CM

## 2021-10-05 DIAGNOSIS — E78.2 MIXED HYPERLIPIDEMIA: ICD-10-CM

## 2021-10-05 PROCEDURE — G8427 DOCREV CUR MEDS BY ELIG CLIN: HCPCS | Performed by: NURSE PRACTITIONER

## 2021-10-05 PROCEDURE — G8536 NO DOC ELDER MAL SCRN: HCPCS | Performed by: NURSE PRACTITIONER

## 2021-10-05 PROCEDURE — G8754 DIAS BP LESS 90: HCPCS | Performed by: NURSE PRACTITIONER

## 2021-10-05 PROCEDURE — G0439 PPPS, SUBSEQ VISIT: HCPCS | Performed by: NURSE PRACTITIONER

## 2021-10-05 PROCEDURE — G8432 DEP SCR NOT DOC, RNG: HCPCS | Performed by: NURSE PRACTITIONER

## 2021-10-05 PROCEDURE — G8420 CALC BMI NORM PARAMETERS: HCPCS | Performed by: NURSE PRACTITIONER

## 2021-10-05 PROCEDURE — 1101F PT FALLS ASSESS-DOCD LE1/YR: CPT | Performed by: NURSE PRACTITIONER

## 2021-10-05 PROCEDURE — G8753 SYS BP > OR = 140: HCPCS | Performed by: NURSE PRACTITIONER

## 2021-10-05 PROCEDURE — 99214 OFFICE O/P EST MOD 30 MIN: CPT | Performed by: NURSE PRACTITIONER

## 2021-10-05 PROCEDURE — 3017F COLORECTAL CA SCREEN DOC REV: CPT | Performed by: NURSE PRACTITIONER

## 2021-10-05 RX ORDER — METOPROLOL SUCCINATE 200 MG/1
200 TABLET, EXTENDED RELEASE ORAL DAILY
Qty: 90 TABLET | Refills: 1 | Status: SHIPPED | OUTPATIENT
Start: 2021-10-05 | End: 2022-10-27 | Stop reason: SDUPTHER

## 2021-10-05 RX ORDER — AMLODIPINE BESYLATE 10 MG/1
TABLET ORAL
Qty: 90 TABLET | Refills: 1 | Status: SHIPPED | OUTPATIENT
Start: 2021-10-05 | End: 2022-10-27 | Stop reason: SDUPTHER

## 2021-10-05 RX ORDER — ISOSORBIDE MONONITRATE 30 MG/1
30 TABLET, EXTENDED RELEASE ORAL DAILY
Qty: 90 TABLET | Refills: 1 | Status: CANCELLED | OUTPATIENT
Start: 2021-10-05

## 2021-10-05 RX ORDER — SPIRONOLACTONE 25 MG/1
25 TABLET ORAL 2 TIMES DAILY
Qty: 180 TABLET | Refills: 1 | Status: SHIPPED | OUTPATIENT
Start: 2021-10-05 | End: 2022-10-27 | Stop reason: SDUPTHER

## 2021-10-05 RX ORDER — ERGOCALCIFEROL 1.25 MG/1
50000 CAPSULE ORAL
Qty: 12 CAPSULE | Refills: 1 | Status: SHIPPED | OUTPATIENT
Start: 2021-10-05

## 2021-10-05 RX ORDER — PRAVASTATIN SODIUM 80 MG/1
80 TABLET ORAL DAILY
Qty: 90 TABLET | Refills: 1 | Status: SHIPPED | OUTPATIENT
Start: 2021-10-05 | End: 2022-09-20 | Stop reason: SDUPTHER

## 2021-10-05 NOTE — PATIENT INSTRUCTIONS
Medicare Wellness Visit, Male    The best way to live healthy is to have a lifestyle where you eat a well-balanced diet, exercise regularly, limit alcohol use, and quit all forms of tobacco/nicotine, if applicable. Regular preventive services are another way to keep healthy. Preventive services (vaccines, screening tests, monitoring & exams) can help personalize your care plan, which helps you manage your own care. Screening tests can find health problems at the earliest stages, when they are easiest to treat. [facility] follows the current, evidence-based guidelines published by the [facility] (RUSTSTF) when recommending preventive services for our patients. Because we follow these guidelines, sometimes recommendations change over time as research supports it. (For example, a prostate screening blood test is no longer routinely recommended for men with no symptoms). Of course, you and your doctor may decide to screen more often for some diseases, based on your risk and co-morbidities (chronic disease you are already diagnosed with). Preventive services for you include:  - Medicare offers their members a free annual wellness visit, which is time for you and your primary care provider to discuss and plan for your preventive service needs. Take advantage of this benefit every year!  -All adults over age 72 should receive the recommended pneumonia vaccines. Current USPSTF guidelines recommend a series of two vaccines for the best pneumonia protection.   -All adults should have a flu vaccine yearly and tetanus vaccine every 10 years.  -All adults age 48 and older should receive the shingles vaccines (series of two vaccines).        -All adults age 38-68 who are overweight should have a diabetes screening test once every three years.   -Other screening tests & preventive services for persons with diabetes include: an eye exam to screen for diabetic retinopathy, a kidney function test, a foot exam, and stricter control over your cholesterol.   -Cardiovascular screening for adults with routine risk involves an electrocardiogram (ECG) at intervals determined by the provider.   -Colorectal cancer screening should be done for adults age 54-65 with no increased risk factors for colorectal cancer. There are a number of acceptable methods of screening for this type of cancer. Each test has its own benefits and drawbacks. Discuss with your provider what is most appropriate for you during your annual wellness visit. The different tests include: colonoscopy (considered the best screening method), a fecal occult blood test, a fecal DNA test, and sigmoidoscopy.  -All adults born between [facility] should be screened once for Hepatitis C.  -An Abdominal Aortic Aneurysm (AAA) Screening is recommended for men age 73-68 who has ever smoked in their lifetime.      Here is a list of your current Health Maintenance items (your personalized list of preventive services) with a due date:  Health Maintenance Due   Topic Date Due    COVID-19 Vaccine (1) Never done    DTaP/Tdap/Td  (1 - Tdap) Never done    Shingles Vaccine (1 of 2) Never done    Colorectal Screening  10/28/2020    Yearly Flu Vaccine (1) Never done    Cholesterol Test   09/18/2021

## 2021-10-05 NOTE — PROGRESS NOTES
Fernando Garza is a 70 y.o. male presenting today for Hypertension (4 month follow-up), Hip Pain (1 week), and Annual Wellness Visit  . Chief Complaint   Patient presents with    Hypertension     4 month follow-up    Hip Pain     1 week    Annual Wellness Visit       HPI:  Fernando Garza presents to the office today for Annual Wellness and hypertension  Follow-up care. He is complaining of left lumbar back pain x 1 week. He notes the pain is improving daily. He denies any injury but admits he sleep on the couch nightly. HTN- BP is elevated today. He did not take his medication this a.m. and his BP is 160/100. He is negative for any chest pain or palpitation. Hyperlipidemia-prescribed statin therapy daily. Previous lipid panel back in September, 2020. ROS    ROS:  History obtained from the patient intake forms which are reviewed with the patient  · General: negative for - chills, fever, weight changes or malaise  · HEENT: no sore throat, nasal congestion, vision problems or ear problems  · Respiratory: no cough, shortness of breath, or wheezing  · Cardiovascular: no chest pain, palpitations, or dyspnea on exertion  · Gastrointestinal: no abdominal pain, N/V, change in bowel habits, or black or bloody stools  · Musculoskeletal: no back pain, joint pain, joint stiffness, muscle pain or muscle weakness  · Neurological: no numbness, tingling, headache or dizziness  · Endo:  No polyuria or polydipsia. · : no hematuria, dysuria, frequency, hesitancy, or nocturia.     · Psychological: negative for - anxiety, depression, sleep disturbances, suicidal or homicidal ideations    Allergies   Allergen Reactions    Lisinopril Anaphylaxis and Swelling     Swelling of tongue,  angioedema       PHQ Screening   3 most recent PHQ Screens 10/5/2021   Little interest or pleasure in doing things Not at all   Feeling down, depressed, irritable, or hopeless Not at all   Total Score PHQ 2 0       History  Past Medical History:   Diagnosis Date    CAD (coronary artery disease)     Cardiac catheterization 03/23/2001    RCA patent. m/dRPDA 55%. LM patent. LAD patent. CX patent. oOMB 75%. LVEDP 8-10 mmHg. EF 50%. Global hypk. Patent bilateral renal arteries.  Cardiac echocardiogram 10/24/2012    EF 55-60%. No WMA. Mild conc LVH. Gr 1 DDfx. RVSP 20-25 mmHg. Mild LAE. Mild ADAM. Possible sm pericardial effusion but likely fat pad.  Cardiac nuclear imaging test, low risk 10/24/2012    No ischemia or prior infarction. EF 58%. Normal EKG on max EST. Ex time 6 min. Low risk.  History of alcoholism ([facility] [address]     Personal history      Hypercholesteremia     Hypertension     Hypertrophy (benign) of prostate     Without urinary obstruction and other lower urinary tract symptoms (LUTS)    Mixed hyperlipidemia        Past Surgical History:   Procedure Laterality Date    HX COLONOSCOPY  10/2015    neg; h/o polyps    HX HEART CATHETERIZATION  03/23/2001    HX OTHER SURGICAL      sgy to correct club foot       Social History     Socioeconomic History    Marital status: SINGLE     Spouse name: Not on file    Number of children: Not on file    Years of education: Not on file    Highest education level: Not on file   Occupational History    Not on file   Tobacco Use    Smoking status: Current Every Day Smoker     Packs/day: 2.50    Smokeless tobacco: Never Used   Substance and Sexual Activity    Alcohol use: Yes     Comment: 10 beers a day    Drug use: No    Sexual activity: Not Currently     Partners: Female   Other Topics Concern    Not on file   Social History Narrative    Not on file     Social Determinants of Health     Financial Resource Strain:     Difficulty of Paying Living Expenses:    Food Insecurity:     Worried About Running Out of Food in the Last Year:     Ran Out of Food in the Last Year:    Transportation Needs:     Lack of Transportation (Medical):      Lack of Transportation (Non-Medical):    Physical Activity:     Days of Exercise per Week:     Minutes of Exercise per Session:    Stress:     Feeling of Stress :    Social Connections:     Frequency of Communication with Friends and Family:     Frequency of Social Gatherings with Friends and Family:     Attends Yazidism Services:     Active Member of Clubs or Organizations:     Attends Club or Organization Meetings:     Marital Status:    Intimate Partner Violence:     Fear of Current or Ex-Partner:     Emotionally Abused:     Physically Abused:     Sexually Abused:        Current Outpatient Medications   Medication Sig Dispense Refill    spironolactone (ALDACTONE) 25 mg tablet Take 1 Tablet by mouth two (2) times a day. 180 Tablet 1    pravastatin (PRAVACHOL) 80 mg tablet Take 1 Tablet by mouth daily. 90 Tablet 1    metoprolol succinate (TOPROL-XL) 200 mg XL tablet Take 1 Tablet by mouth daily. 90 Tablet 1    amLODIPine (NORVASC) 10 mg tablet TAKE 1 TABLET BY MOUTH ONCE DAILY FOR HIGH BLOOD PRESSURE 90 Tablet 1    ergocalciferol (ERGOCALCIFEROL) 1,250 mcg (50,000 unit) capsule Take 1 Capsule by mouth every seven (7) days. Indications: vitamin D deficiency (high dose therapy) 12 Capsule 1    isosorbide mononitrate ER (IMDUR) 30 mg tablet TAKE 1 TABLET BY MOUTH EVERY DAY 90 Tablet 1    Blood Pressure Test Kit-Medium kit 1 Kit by Does Not Apply route two (2) times a day. 1 Kit 0    omega-3 fatty acids-vitamin e (FISH OIL) 1,000 mg Cap Take 1 capsule by mouth as needed.  aspirin (ASPIRIN) 325 mg tablet Take 325 mg by mouth daily. Vitals:    10/05/21 1123 10/05/21 1205   BP: (!) 151/77 (!) 160/80   Pulse: 80    Resp: 16    Temp: 98.7 °F (37.1 °C)    TempSrc: Oral    SpO2: 97%    Weight: 176 lb (79.8 kg)    Height: 5' 11\" (1.803 m)    PainSc:   0 - No pain        Physical Exam  Vitals and nursing note reviewed. Constitutional:       Appearance: Normal appearance.    Cardiovascular:      Rate and Rhythm: Normal rate and regular rhythm. Pulses: Normal pulses. Heart sounds: Normal heart sounds. Pulmonary:      Effort: Pulmonary effort is normal.      Breath sounds: Normal breath sounds. Abdominal:      General: Bowel sounds are normal.      Palpations: Abdomen is soft. Skin:     General: Skin is warm and dry. Neurological:      Mental Status: He is alert. No visits with results within 3 Month(s) from this visit. Latest known visit with results is:   Hospital Outpatient Visit on 02/10/2021   Component Date Value Ref Range Status    WBC 02/10/2021 4.7  4.6 - 13.2 K/uL Final    RBC 02/10/2021 4.53* 4.70 - 5.50 M/uL Final    HGB 02/10/2021 13.0  13.0 - 16.0 g/dL Final    HCT 02/10/2021 41.0  36.0 - 48.0 % Final    MCV 02/10/2021 90.5  74.0 - 97.0 FL Final    MCH 02/10/2021 28.7  24.0 - 34.0 PG Final    MCHC 02/10/2021 31.7  31.0 - 37.0 g/dL Final    RDW 02/10/2021 15.4* 11.6 - 14.5 % Final    PLATELET 97/69/2441 360  135 - 420 K/uL Final    MPV 02/10/2021 13.3* 9.2 - 11.8 FL Final    NEUTROPHILS 02/10/2021 44  40 - 73 % Final    LYMPHOCYTES 02/10/2021 36  21 - 52 % Final    MONOCYTES 02/10/2021 17* 3 - 10 % Final    EOSINOPHILS 02/10/2021 2  0 - 5 % Final    BASOPHILS 02/10/2021 1  0 - 2 % Final    ABS. NEUTROPHILS 02/10/2021 2.1  1.8 - 8.0 K/UL Final    ABS. LYMPHOCYTES 02/10/2021 1.7  0.9 - 3.6 K/UL Final    ABS. MONOCYTES 02/10/2021 0.8  0.05 - 1.2 K/UL Final    ABS. EOSINOPHILS 02/10/2021 0.1  0.0 - 0.4 K/UL Final    ABS. BASOPHILS 02/10/2021 0.0  0.0 - 0.1 K/UL Final    DF 02/10/2021 AUTOMATED    Final       No results found for any visits on 10/05/21. Patient Care Team:  Patient Care Team:  Filiberto Cueva NP as PCP - General (Nurse Practitioner)  Filibreto Cueva NP as PCP - Renetta Murguia Provider      Assessment / Plan:      ICD-10-CM ICD-9-CM    1. Medicare annual wellness visit, subsequent  Z00.00 V70.0    2.  Essential hypertension  I10 401.9 spironolactone (ALDACTONE) 25 mg tablet      pravastatin (PRAVACHOL) 80 mg tablet      metoprolol succinate (TOPROL-XL) 200 mg XL tablet      amLODIPine (NORVASC) 10 mg tablet   3. Vitamin D deficiency  E55.9 268.9 ergocalciferol (ERGOCALCIFEROL) 1,250 mcg (50,000 unit) capsule   4. Mixed hyperlipidemia  E78.2 272.2 pravastatin (PRAVACHOL) 80 mg tablet         Follow-up and Dispositions    · Return in about 3 months (around 1/5/2022). I asked the patient if he  had any questions and answered his  questions. The patient stated that he understands the treatment plan and agrees with the treatment plan    This document was created with a voice activated dictation system and may contain transcription errors.

## 2021-10-05 NOTE — PROGRESS NOTES
This is the Subsequent Medicare Annual Wellness Exam, performed 12 months or more after the Initial AWV or the last Subsequent AWV    I have reviewed the patient's medical history in detail and updated the computerized patient record. Assessment/Plan   Education and counseling provided:  Are appropriate based on today's review and evaluation    1. Essential hypertension  2. Vitamin D deficiency       Depression Risk Factor Screening     3 most recent PHQ Screens 10/5/2021   Little interest or pleasure in doing things Not at all   Feeling down, depressed, irritable, or hopeless Not at all   Total Score PHQ 2 0       Alcohol Risk Screen    Do you average more than 1 drink per night or more than 7 drinks a week: No    In the past three months have you have had more than 4 drinks containing alcohol on one occasion: No        Functional Ability and Level of Safety    Hearing: Hearing is good. Activities of Daily Living: The home contains: no safety equipment. Patient does total self care      Ambulation: with no difficulty     Fall Risk:  Fall Risk Assessment, last 12 mths 10/5/2021   Able to walk? Yes   Fall in past 12 months? 0   Do you feel unsteady?  0   Are you worried about falling 0      Abuse Screen:  Patient is not abused       Cognitive Screening    Has your family/caregiver stated any concerns about your memory: no     Cognitive Screening: Normal - Clock Drawing Test    Health Maintenance Due     Health Maintenance Due   Topic Date Due    COVID-19 Vaccine (1) Never done    DTaP/Tdap/Td series (1 - Tdap) Never done    Shingrix Vaccine Age 50> (1 of 2) Never done    Colorectal Cancer Screening Combo  10/28/2020    Medicare Yearly Exam  08/28/2021    Flu Vaccine (1) Never done    Lipid Screen  09/18/2021       Patient Care Team   Patient Care Team:  Pillo Lopez NP as PCP - General (Nurse Practitioner)  Pillo Lopez NP as PCP - Renetta Jasmineled Provider    History     Patient Active Problem List   Diagnosis Code    Hypercholesteremia E78.00    Hypertension I10    CAD (coronary artery disease) I25.10    Tobacco use disorder F17.200    Alcohol abuse F10.10    History of alcoholism ([facility] [address] F10.21    Hypertrophy (benign) of prostate N40.0    Mixed hyperlipidemia E78.2    Advance directive discussed with patient Z70.80     Past Medical History:   Diagnosis Date    CAD (coronary artery disease)     Cardiac catheterization 03/23/2001    RCA patent. m/dRPDA 55%. LM patent. LAD patent. CX patent. oOMB 75%. LVEDP 8-10 mmHg. EF 50%. Global hypk. Patent bilateral renal arteries.  Cardiac echocardiogram 10/24/2012    EF 55-60%. No WMA. Mild conc LVH. Gr 1 DDfx. RVSP 20-25 mmHg. Mild LAE. Mild ADAM. Possible sm pericardial effusion but likely fat pad.  Cardiac nuclear imaging test, low risk 10/24/2012    No ischemia or prior infarction. EF 58%. Normal EKG on max EST. Ex time 6 min. Low risk.     History of alcoholism ([facility]     Personal history      Hypercholesteremia     Hypertension     Hypertrophy (benign) of prostate     Without urinary obstruction and other lower urinary tract symptoms (LUTS)    Mixed hyperlipidemia       Past Surgical History:   Procedure Laterality Date    HX COLONOSCOPY  10/2015    neg; h/o polyps    HX HEART CATHETERIZATION  03/23/2001    HX OTHER SURGICAL      sgy to correct club foot     Current Outpatient Medications   Medication Sig Dispense Refill    amLODIPine (NORVASC) 10 mg tablet TAKE 1 TABLET BY MOUTH ONCE DAILY FOR HIGH BLOOD PRESSURE 90 Tablet 1    pravastatin (PRAVACHOL) 80 mg tablet TAKE 1 TABLET BY MOUTH EVERY DAY 90 Tablet 1    metoprolol succinate (TOPROL-XL) 200 mg XL tablet TAKE 1 TABLET BY MOUTH EVERY DAY 90 Tablet 1    isosorbide mononitrate ER (IMDUR) 30 mg tablet TAKE 1 TABLET BY MOUTH EVERY DAY 90 Tablet 1    spironolactone (ALDACTONE) 25 mg tablet TAKE 1 TABLET BY MOUTH TWICE A  Tablet 1    ergocalciferol (ERGOCALCIFEROL) 1,250 mcg (50,000 unit) capsule Take 1 Capsule by mouth every seven (7) days. Indications: vitamin D deficiency (high dose therapy) 12 Capsule 1    Blood Pressure Test Kit-Medium kit 1 Kit by Does Not Apply route two (2) times a day. 1 Kit 0    omega-3 fatty acids-vitamin e (FISH OIL) 1,000 mg Cap Take 1 capsule by mouth as needed.  aspirin (ASPIRIN) 325 mg tablet Take 325 mg by mouth daily.          Allergies   Allergen Reactions    Lisinopril Anaphylaxis and Swelling     Swelling of tongue,  angioedema       Family History   Problem Relation Age of Onset    Heart Disease Mother     Heart Surgery Mother     Dementia Father      Social History     Tobacco Use    Smoking status: Current Every Day Smoker     Packs/day: 2.50    Smokeless tobacco: Never Used   Substance Use Topics    Alcohol use: Yes     Comment: 10 beers a day         [facility]

## 2021-10-05 NOTE — PROGRESS NOTES
Myles Chery presents today for   Chief Complaint   Patient presents with    Hypertension     4 month follow-up    Hip Pain     1 week    Annual Wellness Visit       Is someone accompanying this pt? no    Is the patient using any DME equipment during OV? no    Depression Screening:  3 most recent PHQ Screens 10/5/2021   Little interest or pleasure in doing things Not at all   Feeling down, depressed, irritable, or hopeless Not at all   Total Score PHQ 2 0       Learning Assessment:  Learning Assessment 4/28/2021   PRIMARY LEARNER Patient   CO-LEARNER CAREGIVER -   PRIMARY LANGUAGE ENGLISH   LEARNER PREFERENCE PRIMARY DEMONSTRATION   ANSWERED BY patient   RELATIONSHIP SELF       Abuse Screening:  Abuse Screening Questionnaire 10/5/2021   Do you ever feel afraid of your partner? N   Are you in a relationship with someone who physically or mentally threatens you? -   Is it safe for you to go home? Y       Fall Risk  Fall Risk Assessment, last 12 mths 10/5/2021   Able to walk? Yes   Fall in past 12 months? 0   Do you feel unsteady? 0   Are you worried about falling 0       Health Maintenance reviewed and discussed and ordered per Provider. Health Maintenance Due   Topic Date Due    COVID-19 Vaccine (1) Never done    DTaP/Tdap/Td series (1 - Tdap) Never done    Shingrix Vaccine Age 50> (1 of 2) Never done    Colorectal Cancer Screening Combo  10/28/2020    Medicare Yearly Exam  08/28/2021    Flu Vaccine (1) Never done    Lipid Screen  09/18/2021   . Coordination of Care:  1. Have you been to the ER, urgent care clinic since your last visit? Hospitalized since your last visit? no    2. Have you seen or consulted any other health care providers outside of the [address] [place] [address] since your last visit? Include any pap smears or colon screening.  no

## 2022-01-25 ENCOUNTER — TELEPHONE (OUTPATIENT)
Dept: FAMILY MEDICINE CLINIC | Age: 72
End: 2022-01-25

## 2022-01-25 NOTE — TELEPHONE ENCOUNTER
Labs are placed patient can go to [facility] or [facility] to have them drawn.

## 2022-01-25 NOTE — TELEPHONE ENCOUNTER
----- Message from Perezwinsome Kaiser sent at 1/24/2022 10:59 AM EST -----  Subject: Message to Provider    QUESTIONS  Information for Provider? Patient needs contacted to schedule lab visit   before appt Feb 24th at 1140.  ---------------------------------------------------------------------------  --------------  CALL BACK INFO  What is the best way for the office to contact you? OK to leave message on   voicemail  Preferred Call Back Phone Number? [phone]  ---------------------------------------------------------------------------  --------------  SCRIPT ANSWERS  Relationship to Patient?  Self

## 2022-01-26 ENCOUNTER — TELEPHONE (OUTPATIENT)
Dept: FAMILY MEDICINE CLINIC | Age: 72
End: 2022-01-26

## 2022-01-26 NOTE — TELEPHONE ENCOUNTER
TC to patient, he was notified that labs were placed and to get drawn a week prior to his appointment. Patient verbalized his understanding.

## 2022-02-18 ENCOUNTER — HOSPITAL ENCOUNTER (OUTPATIENT)
Dept: LAB | Age: 72
Discharge: HOME | End: 2022-02-18
Payer: MEDICARE

## 2022-02-18 DIAGNOSIS — Z12.5 SCREENING FOR PROSTATE CANCER: ICD-10-CM

## 2022-02-18 DIAGNOSIS — E78.2 MIXED HYPERLIPIDEMIA: ICD-10-CM

## 2022-02-18 DIAGNOSIS — I10 ESSENTIAL HYPERTENSION: ICD-10-CM

## 2022-02-18 LAB
ALBUMIN SERPL-MCNC: 3.9 G/DL (ref 3.4–5)
ALBUMIN/GLOB SERPL: 1 {RATIO} (ref 0.8–1.7)
ALP SERPL-CCNC: 96 U/L (ref 45–117)
ALT SERPL-CCNC: 12 U/L (ref 16–61)
ANION GAP SERPL CALC-SCNC: 9 MMOL/L (ref 3–18)
AST SERPL-CCNC: 37 U/L (ref 10–38)
BASOPHILS # BLD: 0.1 K/UL (ref 0–0.1)
BASOPHILS NFR BLD: 1 % (ref 0–2)
BILIRUB SERPL-MCNC: 0.7 MG/DL (ref 0.2–1)
BUN SERPL-MCNC: 7 MG/DL (ref 7–18)
BUN/CREAT SERPL: 10 (ref 12–20)
CALCIUM SERPL-MCNC: 9.4 MG/DL (ref 8.5–10.1)
CHLORIDE SERPL-SCNC: 105 MMOL/L (ref 100–111)
CHOLEST SERPL-MCNC: 204 MG/DL
CO2 SERPL-SCNC: 25 MMOL/L (ref 21–32)
CREAT SERPL-MCNC: 0.73 MG/DL (ref 0.6–1.3)
DIFFERENTIAL METHOD BLD: ABNORMAL
EOSINOPHIL # BLD: 0.1 K/UL (ref 0–0.4)
EOSINOPHIL NFR BLD: 2 % (ref 0–5)
ERYTHROCYTE [DISTWIDTH] IN BLOOD BY AUTOMATED COUNT: 15.5 % (ref 11.6–14.5)
GLOBULIN SER CALC-MCNC: 3.8 G/DL (ref 2–4)
GLUCOSE SERPL-MCNC: 85 MG/DL (ref 74–99)
HCT VFR BLD AUTO: 39.8 % (ref 36–48)
HDLC SERPL-MCNC: 37 MG/DL (ref 40–60)
HDLC SERPL: 5.5 {RATIO} (ref 0–5)
HGB BLD-MCNC: 12.3 G/DL (ref 13–16)
IMM GRANULOCYTES # BLD AUTO: 0 K/UL (ref 0–0.04)
IMM GRANULOCYTES NFR BLD AUTO: 0 % (ref 0–0.5)
LDLC SERPL CALC-MCNC: 147.8 MG/DL (ref 0–100)
LIPID PROFILE,FLP: ABNORMAL
LYMPHOCYTES # BLD: 1.7 K/UL (ref 0.9–3.6)
LYMPHOCYTES NFR BLD: 38 % (ref 21–52)
MCH RBC QN AUTO: 25.7 PG (ref 24–34)
MCHC RBC AUTO-ENTMCNC: 30.9 G/DL (ref 31–37)
MCV RBC AUTO: 83.1 FL (ref 78–100)
MONOCYTES # BLD: 0.5 K/UL (ref 0.05–1.2)
MONOCYTES NFR BLD: 10 % (ref 3–10)
NEUTS SEG # BLD: 2.2 K/UL (ref 1.8–8)
NEUTS SEG NFR BLD: 49 % (ref 40–73)
NRBC # BLD: 0 K/UL (ref 0–0.01)
NRBC BLD-RTO: 0 PER 100 WBC
PLATELET # BLD AUTO: 187 K/UL (ref 135–420)
PMV BLD AUTO: 13 FL (ref 9.2–11.8)
POTASSIUM SERPL-SCNC: 3.3 MMOL/L (ref 3.5–5.5)
PROT SERPL-MCNC: 7.7 G/DL (ref 6.4–8.2)
PSA SERPL-MCNC: 0.7 NG/ML (ref 0–4)
RBC # BLD AUTO: 4.79 M/UL (ref 4.35–5.65)
SODIUM SERPL-SCNC: 139 MMOL/L (ref 136–145)
TRIGL SERPL-MCNC: 96 MG/DL (ref ?–150)
VLDLC SERPL CALC-MCNC: 19.2 MG/DL
WBC # BLD AUTO: 4.6 K/UL (ref 4.6–13.2)

## 2022-02-18 PROCEDURE — 84153 ASSAY OF PSA TOTAL: CPT

## 2022-02-18 PROCEDURE — 80053 COMPREHEN METABOLIC PANEL: CPT

## 2022-02-18 PROCEDURE — 85025 COMPLETE CBC W/AUTO DIFF WBC: CPT

## 2022-02-18 PROCEDURE — 36415 COLL VENOUS BLD VENIPUNCTURE: CPT

## 2022-02-18 PROCEDURE — 80061 LIPID PANEL: CPT

## 2022-02-21 DIAGNOSIS — E87.6 HYPOKALEMIA: Primary | ICD-10-CM

## 2022-02-21 RX ORDER — POTASSIUM CHLORIDE 20 MEQ/1
20 TABLET, EXTENDED RELEASE ORAL DAILY
Qty: 5 TABLET | Refills: 0 | Status: SHIPPED | OUTPATIENT
Start: 2022-02-21

## 2022-02-22 NOTE — PROGRESS NOTES
Please let the patient know the potassium level is little ow. A rx for Potassium was sent to the pharmacist x 5 days.

## 2022-02-25 ENCOUNTER — TELEPHONE (OUTPATIENT)
Dept: FAMILY MEDICINE CLINIC | Age: 72
End: 2022-02-25

## 2022-02-25 NOTE — TELEPHONE ENCOUNTER
----- Message from Tessmayra Cheatham sent at 2/17/2022 12:24 PM EST -----  Subject: Message to Provider    QUESTIONS  Information for Provider? Patient wants to know if he has been set up to   get a LDCT? I dont know what this is. Please reach out to patient.  ---------------------------------------------------------------------------  --------------  CALL BACK INFO  What is the best way for the office to contact you? OK to leave message on   voicemail  Preferred Call Back Phone Number? [phone]  ---------------------------------------------------------------------------  --------------  SCRIPT ANSWERS  Relationship to Patient?  Self

## 2022-02-25 NOTE — TELEPHONE ENCOUNTER
----- Message from Mitch Irma sent at 2/17/2022 12:22 PM EST -----  Subject: Message to Provider    QUESTIONS  Information for Provider? Patient wants to know if he needs to have labs   drawn before his next appointment. Could you check on this and call   patient?  ---------------------------------------------------------------------------  --------------  CALL BACK INFO  What is the best way for the office to contact you? OK to leave message on   voicemail  Preferred Call Back Phone Number? [phone]  ---------------------------------------------------------------------------  --------------  SCRIPT ANSWERS  Relationship to Patient?  Self

## 2022-02-25 NOTE — TELEPHONE ENCOUNTER
Spoke with patient on 2/17/22, he was informed that he needs to complete labs before his appointment. He verbalized his understanding.

## 2022-04-14 ENCOUNTER — OFFICE VISIT (OUTPATIENT)
Dept: FAMILY MEDICINE CLINIC | Age: 72
End: 2022-04-14
Payer: MEDICARE

## 2022-04-14 VITALS
DIASTOLIC BLOOD PRESSURE: 63 MMHG | BODY MASS INDEX: 24.48 KG/M2 | WEIGHT: 171 LBS | TEMPERATURE: 97 F | HEART RATE: 69 BPM | HEIGHT: 70 IN | RESPIRATION RATE: 16 BRPM | OXYGEN SATURATION: 98 % | SYSTOLIC BLOOD PRESSURE: 116 MMHG

## 2022-04-14 DIAGNOSIS — R63.4 WEIGHT LOSS: ICD-10-CM

## 2022-04-14 DIAGNOSIS — E55.9 VITAMIN D DEFICIENCY: Primary | ICD-10-CM

## 2022-04-14 DIAGNOSIS — R68.81 EARLY SATIETY: ICD-10-CM

## 2022-04-14 DIAGNOSIS — F17.210 CIGARETTE NICOTINE DEPENDENCE WITHOUT COMPLICATION: ICD-10-CM

## 2022-04-14 DIAGNOSIS — Z12.11 COLON CANCER SCREENING: ICD-10-CM

## 2022-04-14 PROCEDURE — 1101F PT FALLS ASSESS-DOCD LE1/YR: CPT | Performed by: NURSE PRACTITIONER

## 2022-04-14 PROCEDURE — G8420 CALC BMI NORM PARAMETERS: HCPCS | Performed by: NURSE PRACTITIONER

## 2022-04-14 PROCEDURE — G8752 SYS BP LESS 140: HCPCS | Performed by: NURSE PRACTITIONER

## 2022-04-14 PROCEDURE — G8536 NO DOC ELDER MAL SCRN: HCPCS | Performed by: NURSE PRACTITIONER

## 2022-04-14 PROCEDURE — G8432 DEP SCR NOT DOC, RNG: HCPCS | Performed by: NURSE PRACTITIONER

## 2022-04-14 PROCEDURE — G8754 DIAS BP LESS 90: HCPCS | Performed by: NURSE PRACTITIONER

## 2022-04-14 PROCEDURE — G8427 DOCREV CUR MEDS BY ELIG CLIN: HCPCS | Performed by: NURSE PRACTITIONER

## 2022-04-14 PROCEDURE — 99213 OFFICE O/P EST LOW 20 MIN: CPT | Performed by: NURSE PRACTITIONER

## 2022-04-14 PROCEDURE — 3017F COLORECTAL CA SCREEN DOC REV: CPT | Performed by: NURSE PRACTITIONER

## 2022-04-14 NOTE — PROGRESS NOTES
Sarika Olsen is a 67 y.o. male presenting today for Hypertension, Labs (results), and Cholesterol Problem  . HPI:  Sarika Olsen presents to the office today for hypertension hyperlipidemia follow-up care. He notes that he feels well today and denies any cough, fever, loss of smell or taste or GI upset. Hypertension-his BP today is 116/63. He is compliant with the medication treatment plan and is last labs were in on February 18, 2022. He is compliant with the medication treatment plan for hypertension. He has had a progressive decrease in his weight. He notes that he has stopped drinking alcohol about 2 years ago but admits he smokes about 2 packs of cigarettes per day. He denies any coughing or dyspnea. He notes that he may is not able to eat as much as he used to. He has early satiety. He denies any nausea, vomiting or diarrhea. Hyperlipidemia-patient last lipid panel was September 18, 2020. His cholesterol was 135, his HDL was 39 and LDL 80.4. He is prescribed pravastatin 80 mg tablet daily. He denies any myalgia. ROS    ROS:  History obtained from the patient intake forms which are reviewed with the patient  · General: negative for - chills, fever, weight changes or malaise  · HEENT: no sore throat, nasal congestion, vision problems or ear problems  · Respiratory: no cough, shortness of breath, or wheezing  · Cardiovascular: no chest pain, palpitations, or dyspnea on exertion  · Gastrointestinal: no abdominal pain, N/V, change in bowel habits, or black or bloody stools  · Musculoskeletal: no back pain, joint pain, joint stiffness, muscle pain or muscle weakness  · Neurological: no numbness, tingling, headache or dizziness  · Endo:  No polyuria or polydipsia. · : no hematuria, dysuria, frequency, hesitancy, or nocturia.     · Psychological: negative for - anxiety, depression, sleep disturbances, suicidal or homicidal ideations    Allergies   Allergen Reactions    Lisinopril Anaphylaxis and Swelling     Swelling of tongue,  angioedema       Current Outpatient Medications   Medication Sig Dispense Refill    potassium chloride (K-DUR, KLOR-CON M20) 20 mEq tablet Take 1 Tablet by mouth daily. X 5 DAYS 5 Tablet 0    spironolactone (ALDACTONE) 25 mg tablet Take 1 Tablet by mouth two (2) times a day. 180 Tablet 1    pravastatin (PRAVACHOL) 80 mg tablet Take 1 Tablet by mouth daily. 90 Tablet 1    metoprolol succinate (TOPROL-XL) 200 mg XL tablet Take 1 Tablet by mouth daily. 90 Tablet 1    amLODIPine (NORVASC) 10 mg tablet TAKE 1 TABLET BY MOUTH ONCE DAILY FOR HIGH BLOOD PRESSURE 90 Tablet 1    ergocalciferol (ERGOCALCIFEROL) 1,250 mcg (50,000 unit) capsule Take 1 Capsule by mouth every seven (7) days. Indications: vitamin D deficiency (high dose therapy) 12 Capsule 1    isosorbide mononitrate ER (IMDUR) 30 mg tablet TAKE 1 TABLET BY MOUTH EVERY DAY 90 Tablet 1    Blood Pressure Test Kit-Medium kit 1 Kit by Does Not Apply route two (2) times a day. 1 Kit 0    omega-3 fatty acids-vitamin e (FISH OIL) 1,000 mg Cap Take 1 capsule by mouth as needed.  aspirin (ASPIRIN) 325 mg tablet Take 325 mg by mouth daily. Past Medical History:   Diagnosis Date    CAD (coronary artery disease)     Cardiac catheterization 03/23/2001    RCA patent. m/dRPDA 55%. LM patent. LAD patent. CX patent. oOMB 75%. LVEDP 8-10 mmHg. EF 50%. Global hypk. Patent bilateral renal arteries.  Cardiac echocardiogram 10/24/2012    EF 55-60%. No WMA. Mild conc LVH. Gr 1 DDfx. RVSP 20-25 mmHg. Mild LAE. Mild ADAM. Possible sm pericardial effusion but likely fat pad.  Cardiac nuclear imaging test, low risk 10/24/2012    No ischemia or prior infarction. EF 58%. Normal EKG on max EST. Ex time 6 min. Low risk.    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       Social History     Socioeconomic History    Marital status: SINGLE     Spouse name: Not on file    Number of children: Not on file    Years of education: Not on file    Highest education level: Not on file   Occupational History    Not on file   Tobacco Use    Smoking status: Current Every Day Smoker     Packs/day: 2.50    Smokeless tobacco: Never Used   Substance and Sexual Activity    Alcohol use: Yes     Comment: 10 beers a day    Drug use: No    Sexual activity: Not Currently     Partners: Female   Other Topics Concern    Not on file   Social History Narrative    Not on file     Social Determinants of Health     Financial Resource Strain:     Difficulty of Paying Living Expenses: Not on file   Food Insecurity:     Worried About Running Out of Food in the Last Year: Not on file    Gideon of Food in the Last Year: Not on file   Transportation Needs:     Lack of Transportation (Medical): Not on file    Lack of Transportation (Non-Medical):  Not on file   Physical Activity:     Days of Exercise per Week: Not on file    Minutes of Exercise per Session: Not on file   Stress:     Feeling of Stress : Not on file   Social Connections:     Frequency of Communication with Friends and Family: Not on file    Frequency of Social Gatherings with Friends and Family: Not on file    Attends [employer] Services: Not on file    Active Member of Clubs or Organizations: Not on file    Attends Club or Organization Meetings: Not on file    Marital Status: Not on file   Intimate Partner Violence:     Fear of Current or Ex-Partner: Not on file    Emotionally Abused: Not on file    Physically Abused: Not on file    Sexually Abused: Not on file Housing Stability:     Unable to Pay for Housing in the Last Year: Not on file    Number of Places Lived in the Last Year: Not on file    Unstable Housing in the Last Year: Not on file       Vitals:    04/14/22 1316   BP: 116/63   Pulse: 69   Resp: 16   Temp: 97 °F (36.1 °C)   TempSrc: Oral   SpO2: 98%   Weight: 171 lb (77.6 kg)   Height: 5' 10\" (1.778 m)   PainSc:   0 - No pain       Physical Exam  Vitals and nursing note reviewed. Constitutional:       Appearance: Normal appearance. HENT:      Right Ear: There is impacted cerumen (soft). Left Ear: Tympanic membrane normal.   Cardiovascular:      Rate and Rhythm: Normal rate and regular rhythm. Pulses: Normal pulses. Heart sounds: Normal heart sounds. Pulmonary:      Effort: Pulmonary effort is normal.      Breath sounds: Normal breath sounds. Abdominal:      General: Bowel sounds are normal.      Palpations: Abdomen is soft. Musculoskeletal:         General: No swelling or deformity. Skin:     General: Skin is warm and dry. Neurological:      Mental Status: He is alert. Hospital Outpatient Visit on 02/18/2022   Component Date Value Ref Range Status    LIPID PROFILE 02/18/2022        Final    Cholesterol, total 02/18/2022 204* <200 MG/DL Final    Triglyceride 02/18/2022 96  <150 MG/DL Final    Comment: The drugs N-acetylcysteine (NAC) and  Metamiszole have been found to cause falsely  low results in this chemical assay. Please  be sure to submit blood samples obtained  BEFORE administration of either of these  drugs to assure correct results.       HDL Cholesterol 02/18/2022 37* 40 - 60 MG/DL Final    LDL, calculated 02/18/2022 147.8* 0 - 100 MG/DL Final    VLDL, calculated 02/18/2022 19.2  MG/DL Final    CHOL/HDL Ratio 02/18/2022 5.5* 0 - 5.0   Final    Sodium 02/18/2022 139  136 - 145 mmol/L Final    Potassium 02/18/2022 3.3* 3.5 - 5.5 mmol/L Final    Chloride 02/18/2022 105  100 - 111 mmol/L Final    CO2 02/18/2022 25  21 - 32 mmol/L Final    Anion gap 02/18/2022 9  3.0 - 18 mmol/L Final    Glucose 02/18/2022 85  74 - 99 mg/dL Final    BUN 02/18/2022 7  7.0 - 18 MG/DL Final    Creatinine 02/18/2022 0.73  0.6 - 1.3 MG/DL Final    BUN/Creatinine ratio 02/18/2022 10* 12 - 20   Final    GFR est AA 02/18/2022 >60  >60 ml/min/1.73m2 Final    GFR est non-AA 02/18/2022 >60  >60 ml/min/1.73m2 Final    Comment: (NOTE)  Estimated GFR is calculated using the Modification of Diet in Renal   Disease (MDRD) Study equation, reported for both  Americans   (GFRAA) and non- Americans (GFRNA), and normalized to 1.73m2   body surface area. The physician must decide which value applies to   the patient. The MDRD study equation should only be used in   individuals age 25 or older. It has not been validated for the   following: pregnant women, patients with serious comorbid conditions,   or on certain medications, or persons with extremes of body size,   muscle mass, or nutritional status.  Calcium 02/18/2022 9.4  8.5 - 10.1 MG/DL Final    Bilirubin, total 02/18/2022 0.7  0.2 - 1.0 MG/DL Final    ALT (SGPT) 02/18/2022 12* 16 - 61 U/L Final    AST (SGOT) 02/18/2022 37  10 - 38 U/L Final    Alk.  phosphatase 02/18/2022 96  45 - 117 U/L Final    Protein, total 02/18/2022 7.7  6.4 - 8.2 g/dL Final    Albumin 02/18/2022 3.9  3.4 - 5.0 g/dL Final    Globulin 02/18/2022 3.8  2.0 - 4.0 g/dL Final    A-G Ratio 02/18/2022 1.0  0.8 - 1.7   Final    WBC 02/18/2022 4.6  4.6 - 13.2 K/uL Final    RBC 02/18/2022 4.79  4.35 - 5.65 M/uL Final    HGB 02/18/2022 12.3* 13.0 - 16.0 g/dL Final    HCT 02/18/2022 39.8  36.0 - 48.0 % Final    MCV 02/18/2022 83.1  78.0 - 100.0 FL Final    MCH 02/18/2022 25.7  24.0 - 34.0 PG Final    MCHC 02/18/2022 30.9* 31.0 - 37.0 g/dL Final    RDW 02/18/2022 15.5* 11.6 - 14.5 % Final    PLATELET 06/92/3978 100  135 - 420 K/uL Final    MPV 02/18/2022 13.0* 9.2 - 11.8 FL Final    NRBC 02/18/2022 0.0  0  WBC Final    ABSOLUTE NRBC 02/18/2022 0.00  0.00 - 0.01 K/uL Final    NEUTROPHILS 02/18/2022 49  40 - 73 % Final    LYMPHOCYTES 02/18/2022 38  21 - 52 % Final    MONOCYTES 02/18/2022 10  3 - 10 % Final    EOSINOPHILS 02/18/2022 2  0 - 5 % Final    BASOPHILS 02/18/2022 1  0 - 2 % Final    IMMATURE GRANULOCYTES 02/18/2022 0  0.0 - 0.5 % Final    ABS. NEUTROPHILS 02/18/2022 2.2  1.8 - 8.0 K/UL Final    ABS. LYMPHOCYTES 02/18/2022 1.7  0.9 - 3.6 K/UL Final    ABS. MONOCYTES 02/18/2022 0.5  0.05 - 1.2 K/UL Final    ABS. EOSINOPHILS 02/18/2022 0.1  0.0 - 0.4 K/UL Final    ABS. BASOPHILS 02/18/2022 0.1  0.0 - 0.1 K/UL Final    ABS. IMM. GRANS. 02/18/2022 0.0  0.00 - 0.04 K/UL Final    DF 02/18/2022 AUTOMATED    Final    Prostate Specific Ag 02/18/2022 0.7  0.0 - 4.0 ng/mL Final       .No results found for any visits on 04/14/22. Assessment / Plan:      ICD-10-CM ICD-9-CM    1. Vitamin D deficiency  E55.9 268.9 VITAMIN D, 25 HYDROXY   2. Cigarette nicotine dependence without complication  M39.977 280.3 CT LOW DOSE LUNG CANCER SCREENING   3. Colon cancer screening  Z12.11 V76.51 REFERRAL TO GASTROENTEROLOGY   4. Weight loss  R63.4 783.21 REFERRAL TO GASTROENTEROLOGY   5. Early satiety  R68.81 780.94 REFERRAL TO GASTROENTEROLOGY     CT low-dose lung cancer screening ordered  Hypertension-no change to current treatment plan    I asked the patient if he  had any questions and answered his  questions. The patient stated that he understands the treatment plan and agrees with the treatment plan    This document was created with a voice activated dictation system and may contain transcription errors.

## 2022-04-14 NOTE — PROGRESS NOTES
Susan Burk presents today for   Chief Complaint   Patient presents with    Hypertension    Labs     results    Cholesterol Problem       Is someone accompanying this pt? no    Is the patient using any DME equipment during OV? no    Depression Screening:  3 most recent PHQ Screens 4/14/2022   Little interest or pleasure in doing things Not at all   Feeling down, depressed, irritable, or hopeless Not at all   Total Score PHQ 2 0       Learning Assessment:  Learning Assessment 4/28/2021   PRIMARY LEARNER Patient   CO-LEARNER CAREGIVER -   PRIMARY LANGUAGE ENGLISH   LEARNER PREFERENCE PRIMARY DEMONSTRATION   ANSWERED BY patient   RELATIONSHIP SELF       Abuse Screening:  Abuse Screening Questionnaire 4/14/2022   Do you ever feel afraid of your partner? N   Are you in a relationship with someone who physically or mentally threatens you? -   Is it safe for you to go home? Y       Fall Risk  Fall Risk Assessment, last 12 mths 4/14/2022   Able to walk? Yes   Fall in past 12 months? 0   Do you feel unsteady? 0   Are you worried about falling 0       Health Maintenance reviewed and discussed and ordered per Provider. Health Maintenance Due   Topic Date Due    COVID-19 Vaccine (1) Never done    Pneumococcal 65+ years (1 - PCV) Never done    DTaP/Tdap/Td series (1 - Tdap) Never done    Shingrix Vaccine Age 50> (1 of 2) Never done    Colorectal Cancer Screening Combo  10/28/2020   . Coordination of Care:  1. Have you been to the ER, urgent care clinic since your last visit? Hospitalized since your last visit? no    2. Have you seen or consulted any other health care providers outside of the [address] [place] [address] since your last visit? Include any pap smears or colon screening.  no

## 2022-04-20 ENCOUNTER — NURSE NAVIGATOR (OUTPATIENT)
Dept: OTHER | Age: 72
End: 2022-04-20

## 2022-04-20 NOTE — PROGRESS NOTES
Referring Provider: Luly Dutta NP      Lung Cancer Risk Profile:   Age: 67  Gender: Male  Height: 70\"  Weight: 171#    Smoking History:  Smoking Status: current use  # years smokin  # years quit: 0  Packs/day: 1.5  Pack years: 80    Patient discussed smoking cessation with PCP: Yes, per provider order    Patient participated in shared decision making process with PCP: Yes, per provider order    Patient is currently experiencing symptoms: No    If yes what symptoms:     Co-Morbidities:  CAD    Cancer History:      Additional Risk Factors:   Exposure to second hand smoke      Patient's smoking history verified via EMR and provider note. Patient meets LDCT lung cancer screening criteria.        TAB AltamiranoN, RN, OCN  Lung Health Nurse Navigator

## 2022-04-22 ENCOUNTER — HOSPITAL ENCOUNTER (OUTPATIENT)
Dept: CT IMAGING | Age: 72
Discharge: HOME | End: 2022-04-22
Attending: NURSE PRACTITIONER
Payer: MEDICARE

## 2022-04-22 DIAGNOSIS — F17.210 CIGARETTE NICOTINE DEPENDENCE WITHOUT COMPLICATION: ICD-10-CM

## 2022-04-22 PROCEDURE — 71271 CT THORAX LUNG CANCER SCR C-: CPT

## 2022-04-26 ENCOUNTER — OFFICE VISIT (OUTPATIENT)
Dept: CARDIOLOGY CLINIC | Age: 72
End: 2022-04-26
Payer: MEDICARE

## 2022-04-26 VITALS
WEIGHT: 174 LBS | OXYGEN SATURATION: 98 % | HEART RATE: 63 BPM | SYSTOLIC BLOOD PRESSURE: 116 MMHG | BODY MASS INDEX: 24.91 KG/M2 | HEIGHT: 70 IN | DIASTOLIC BLOOD PRESSURE: 74 MMHG

## 2022-04-26 DIAGNOSIS — I25.119 CORONARY ARTERY DISEASE INVOLVING NATIVE CORONARY ARTERY OF NATIVE HEART WITH ANGINA PECTORIS (HCC): Primary | ICD-10-CM

## 2022-04-26 PROCEDURE — 93000 ELECTROCARDIOGRAM COMPLETE: CPT | Performed by: INTERNAL MEDICINE

## 2022-04-26 PROCEDURE — G8754 DIAS BP LESS 90: HCPCS | Performed by: INTERNAL MEDICINE

## 2022-04-26 PROCEDURE — 99214 OFFICE O/P EST MOD 30 MIN: CPT | Performed by: INTERNAL MEDICINE

## 2022-04-26 PROCEDURE — 1101F PT FALLS ASSESS-DOCD LE1/YR: CPT | Performed by: INTERNAL MEDICINE

## 2022-04-26 PROCEDURE — G8510 SCR DEP NEG, NO PLAN REQD: HCPCS | Performed by: INTERNAL MEDICINE

## 2022-04-26 PROCEDURE — G8427 DOCREV CUR MEDS BY ELIG CLIN: HCPCS | Performed by: INTERNAL MEDICINE

## 2022-04-26 PROCEDURE — G8752 SYS BP LESS 140: HCPCS | Performed by: INTERNAL MEDICINE

## 2022-04-26 PROCEDURE — G8420 CALC BMI NORM PARAMETERS: HCPCS | Performed by: INTERNAL MEDICINE

## 2022-04-26 PROCEDURE — 3017F COLORECTAL CA SCREEN DOC REV: CPT | Performed by: INTERNAL MEDICINE

## 2022-04-26 PROCEDURE — G8536 NO DOC ELDER MAL SCRN: HCPCS | Performed by: INTERNAL MEDICINE

## 2022-04-26 NOTE — PROGRESS NOTES
Prudencio Waters presents today for   Chief Complaint   Patient presents with    Follow-up     1 year follow up- no complaints        Prudencio Waters preferred language for health care discussion is english/other. Is someone accompanying this pt? no    Is the patient using any DME equipment during [address]? no    Depression Screening:  3 most recent PHQ Screens 4/26/2022   Little interest or pleasure in doing things Not at all   Feeling down, depressed, irritable, or hopeless Not at all   Total Score PHQ 2 0       Learning Assessment:  Learning Assessment 4/28/2021   PRIMARY LEARNER Patient   CO-LEARNER CAREGIVER -   PRIMARY LANGUAGE ENGLISH   LEARNER PREFERENCE PRIMARY DEMONSTRATION   ANSWERED BY patient   RELATIONSHIP SELF       Abuse Screening:  Abuse Screening Questionnaire 4/26/2022   Do you ever feel afraid of your partner? N   Are you in a relationship with someone who physically or mentally threatens you? N   Is it safe for you to go home? Y       Fall Risk  Fall Risk Assessment, last 12 mths 4/26/2022   Able to walk? Yes   Fall in past 12 months? 0   Do you feel unsteady? 0   Are you worried about falling 0       Pt currently taking Anticoagulant therapy? no    Coordination of Care:  1. Have you been to the ER, urgent care clinic since your last visit? Hospitalized since your last visit? no    2. Have you seen or consulted any other health care providers outside of the [address] [place] [address] since your last visit? Include any pap smears or colon screening.  no

## 2022-04-26 NOTE — PROGRESS NOTES
HISTORY OF PRESENT ILLNESS  Rebel To is a 67 y.o. male. ASSESSMENT and PLAN    Mr. Andie Grant has history of small vessel disease in his coronary circulation. He has done well on medical therapy. Unfortunately, he still smokes cigarettes about one pack per day.  He was a heavy drinker in the past; however, he has gradually decreased his alcohol intake. In 2020, he was able to discontinue alcohol use completely. Because of erectile dysfunction, he had taken Cialis in the past. He was able to come off long-acting nitrates by cutting back on his alcohol intake. · CAD:    He has known history of small vessel disease. He remains clinically and symptomatically stable. · BP:    Well controlled at 116/74 on current medication regimen. · Rhythm:    Stable sinus rhythm at 63 bpm.  · CHF:    There is no evidence of decompensated CHF noted. · Weight:     His weight today is 174 pounds. His previous weight was 290 pounds. He has lost weight without really trying. · Cholesterol:   Target LDL <70. Lovastatin 80, niacin 500 twice daily. Because of his prior drinking history and abnormal LFTs, he is not on potent statin. However, with his elevated LDL, I would consider switching his lovastatin to Lipitor 80 mg daily or Crestor 20 mg daily. · Tobacco:    He still smokes about half a pack a day. · Anti-platelet:   Remains on ASA. Will defer his weight loss evaluation to his PCP. Again, I encouraged tobacco cessation. I will see him back annually. Thank you. Encounter Diagnoses   Name Primary?     Coronary artery disease involving native coronary artery of native heart with angina pectoris ([facility] [address] Yes     current treatment plan is effective, no change in therapy  lab results and schedule of future lab studies reviewed with patient  reviewed diet, exercise and weight control  very strongly urged to quit smoking to reduce cardiovascular risk  cardiovascular risk and specific lipid/LDL goals reviewed  use of aspirin to prevent MI and TIA's discussed      HPI   Today, Mr. Cynthia Don has no complaints of chest pains. He has baseline dyspnea on exertion without change. He has remained reasonably active physically. He has successfully stayed off of alcohol use since 2020. Unfortunately, he still smokes about a half a pack a day. He has had some weight loss without really trying to lose weight. He denies any orthopnea or PND. He denies any palpitations or dizziness. Review of Systems   Respiratory: Negative for shortness of breath. Cardiovascular: Negative for chest pain, palpitations, orthopnea, claudication, leg swelling and PND. All other systems reviewed and are negative. Physical Exam  Vitals and nursing note reviewed. Constitutional:       Appearance: Normal appearance. HENT:      Head: Normocephalic. Eyes:      Conjunctiva/sclera: Conjunctivae normal.   Neck:      Vascular: No carotid bruit. Cardiovascular:      Rate and Rhythm: Normal rate and regular rhythm. Pulmonary:      Breath sounds: Normal breath sounds. Abdominal:      Palpations: Abdomen is soft. Musculoskeletal:         General: No swelling. Cervical back: No rigidity. Skin:     General: Skin is warm and dry. Neurological:      General: No focal deficit present. Mental Status: He is alert and oriented to person, place, and time. Psychiatric:         Mood and Affect: Mood normal.         Behavior: Behavior normal.         PCP: Levi Quick NP    Past Medical History:   Diagnosis Date    CAD (coronary artery disease)     Cardiac catheterization 03/23/2001    RCA patent. m/dRPDA 55%. LM patent. LAD patent. CX patent. oOMB 75%. LVEDP 8-10 mmHg. EF 50%. Global hypk. Patent bilateral renal arteries.  Cardiac echocardiogram 10/24/2012    EF 55-60%. No WMA. Mild conc LVH. Gr 1 DDfx. RVSP 20-25 mmHg. Mild LAE. Mild ADAM. Possible sm pericardial effusion but likely fat pad.  Cardiac nuclear imaging test, low risk 10/24/2012    No ischemia or prior infarction. EF 58%. Normal EKG on max EST. Ex time 6 min. Low risk. 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       Current Outpatient Medications   Medication Sig Dispense Refill    potassium chloride (K-DUR, KLOR-CON M20) 20 mEq tablet Take 1 Tablet by mouth daily. X 5 DAYS 5 Tablet 0    spironolactone (ALDACTONE) 25 mg tablet Take 1 Tablet by mouth two (2) times a day. 180 Tablet 1    pravastatin (PRAVACHOL) 80 mg tablet Take 1 Tablet by mouth daily. 90 Tablet 1    metoprolol succinate (TOPROL-XL) 200 mg XL tablet Take 1 Tablet by mouth daily. 90 Tablet 1    amLODIPine (NORVASC) 10 mg tablet TAKE 1 TABLET BY MOUTH ONCE DAILY FOR HIGH BLOOD PRESSURE 90 Tablet 1    ergocalciferol (ERGOCALCIFEROL) 1,250 mcg (50,000 unit) capsule Take 1 Capsule by mouth every seven (7) days. Indications: vitamin D deficiency (high dose therapy) 12 Capsule 1    isosorbide mononitrate ER (IMDUR) 30 mg tablet TAKE 1 TABLET BY MOUTH EVERY DAY 90 Tablet 1    Blood Pressure Test Kit-Medium kit 1 Kit by Does Not Apply route two (2) times a day. 1 Kit 0    omega-3 fatty acids-vitamin e (FISH OIL) 1,000 mg Cap Take 1 capsule by mouth as needed.  aspirin (ASPIRIN) 325 mg tablet Take 325 mg by mouth daily.            The patient has a family history of    Social History     Tobacco Use    Smoking status: Current Every Day Smoker     Packs/day: 2.50    Smokeless tobacco: Never Used   Substance Use Topics    Alcohol use: Yes     Comment: 10 beers a day    Drug use: No       Lab Results   Component Value Date/Time    Cholesterol, total 204 (H) 02/18/2022 03:51 PM    HDL Cholesterol 37 (L) 02/18/2022 03:51 PM    LDL, calculated 147.8 (H) 02/18/2022 03:51 PM    Triglyceride 96 02/18/2022 03:51 PM    CHOL/HDL Ratio 5.5 (H) 02/18/2022 03:51 PM        BP Readings from Last 3 Encounters:   04/26/22 116/74   04/14/22 116/63   10/05/21 (!) 160/80        Pulse Readings from Last 3 Encounters:   04/26/22 63   04/14/22 69   10/05/21 80       Wt Readings from Last 3 Encounters:   04/26/22 78.9 kg (174 lb)   04/14/22 77.6 kg (171 lb)   10/05/21 79.8 kg (176 lb)         EKG: unchanged from previous tracings, normal sinus rhythm, nonspecific ST and T waves changes, LVH by limb lead voltage criteria.

## 2022-04-29 NOTE — PROGRESS NOTES
Please let patient know the xray results showed Lung-RADS Category: 1: Negative. Management recommendation: 1 year low-dose  lung screening CT. Severe coronary arteriosclerosis. Please fax results to his Cardiology office re: Severe coronary arteriosclerosis.

## 2022-05-04 ENCOUNTER — TELEPHONE (OUTPATIENT)
Dept: CARDIOLOGY CLINIC | Age: 72
End: 2022-05-04

## 2022-05-13 NOTE — TELEPHONE ENCOUNTER
Pt had called because he had had a CT chest done and it showed \"severe coronary arteriosclerosis\". Reviewed results with Dr Nathaly Tucker. No new orders. Called pt to let him know that Dr Nathaly Tucker did not want to make any changes to his plan of care based on results. Pt content with call.

## 2022-07-14 RX ORDER — ISOSORBIDE MONONITRATE 30 MG/1
30 TABLET, EXTENDED RELEASE ORAL DAILY
Qty: 90 TABLET | Refills: 1 | Status: SHIPPED | OUTPATIENT
Start: 2022-07-14 | End: 2022-10-27 | Stop reason: SDUPTHER

## 2022-09-20 DIAGNOSIS — E78.2 MIXED HYPERLIPIDEMIA: ICD-10-CM

## 2022-09-20 DIAGNOSIS — I10 ESSENTIAL HYPERTENSION: ICD-10-CM

## 2022-09-20 RX ORDER — PRAVASTATIN SODIUM 80 MG/1
80 TABLET ORAL DAILY
Qty: 90 TABLET | Refills: 1 | Status: SHIPPED | OUTPATIENT
Start: 2022-09-20 | End: 2022-10-27

## 2022-09-20 NOTE — TELEPHONE ENCOUNTER
Requested Prescriptions     Pending Prescriptions Disp Refills    pravastatin (PRAVACHOL) 80 mg tablet 90 Tablet 1     Sig: Take 1 Tablet by mouth daily.      Next appointment 10/26/22

## 2022-10-27 ENCOUNTER — OFFICE VISIT (OUTPATIENT)
Dept: FAMILY MEDICINE CLINIC | Age: 72
End: 2022-10-27
Payer: MEDICARE

## 2022-10-27 VITALS
HEIGHT: 70 IN | DIASTOLIC BLOOD PRESSURE: 72 MMHG | OXYGEN SATURATION: 99 % | WEIGHT: 169 LBS | RESPIRATION RATE: 13 BRPM | BODY MASS INDEX: 24.2 KG/M2 | HEART RATE: 60 BPM | SYSTOLIC BLOOD PRESSURE: 130 MMHG

## 2022-10-27 DIAGNOSIS — F10.21 HISTORY OF ALCOHOLISM (HCC): ICD-10-CM

## 2022-10-27 DIAGNOSIS — I10 ESSENTIAL HYPERTENSION: ICD-10-CM

## 2022-10-27 DIAGNOSIS — R63.4 RECENT UNINTENTIONAL WEIGHT LOSS OVER SEVERAL MONTHS: Primary | ICD-10-CM

## 2022-10-27 DIAGNOSIS — E87.6 HYPOKALEMIA: ICD-10-CM

## 2022-10-27 DIAGNOSIS — I25.10 CORONARY ARTERY DISEASE INVOLVING NATIVE CORONARY ARTERY OF NATIVE HEART WITHOUT ANGINA PECTORIS: ICD-10-CM

## 2022-10-27 DIAGNOSIS — E55.9 VITAMIN D DEFICIENCY: ICD-10-CM

## 2022-10-27 DIAGNOSIS — R06.02 SOB (SHORTNESS OF BREATH): ICD-10-CM

## 2022-10-27 DIAGNOSIS — E78.2 MIXED HYPERLIPIDEMIA: ICD-10-CM

## 2022-10-27 PROCEDURE — G8536 NO DOC ELDER MAL SCRN: HCPCS | Performed by: STUDENT IN AN ORGANIZED HEALTH CARE EDUCATION/TRAINING PROGRAM

## 2022-10-27 PROCEDURE — 3074F SYST BP LT 130 MM HG: CPT | Performed by: STUDENT IN AN ORGANIZED HEALTH CARE EDUCATION/TRAINING PROGRAM

## 2022-10-27 PROCEDURE — G8752 SYS BP LESS 140: HCPCS | Performed by: STUDENT IN AN ORGANIZED HEALTH CARE EDUCATION/TRAINING PROGRAM

## 2022-10-27 PROCEDURE — G8510 SCR DEP NEG, NO PLAN REQD: HCPCS | Performed by: STUDENT IN AN ORGANIZED HEALTH CARE EDUCATION/TRAINING PROGRAM

## 2022-10-27 PROCEDURE — G8754 DIAS BP LESS 90: HCPCS | Performed by: STUDENT IN AN ORGANIZED HEALTH CARE EDUCATION/TRAINING PROGRAM

## 2022-10-27 PROCEDURE — 1123F ACP DISCUSS/DSCN MKR DOCD: CPT | Performed by: STUDENT IN AN ORGANIZED HEALTH CARE EDUCATION/TRAINING PROGRAM

## 2022-10-27 PROCEDURE — 1101F PT FALLS ASSESS-DOCD LE1/YR: CPT | Performed by: STUDENT IN AN ORGANIZED HEALTH CARE EDUCATION/TRAINING PROGRAM

## 2022-10-27 PROCEDURE — 3017F COLORECTAL CA SCREEN DOC REV: CPT | Performed by: STUDENT IN AN ORGANIZED HEALTH CARE EDUCATION/TRAINING PROGRAM

## 2022-10-27 PROCEDURE — 3078F DIAST BP <80 MM HG: CPT | Performed by: STUDENT IN AN ORGANIZED HEALTH CARE EDUCATION/TRAINING PROGRAM

## 2022-10-27 PROCEDURE — G8420 CALC BMI NORM PARAMETERS: HCPCS | Performed by: STUDENT IN AN ORGANIZED HEALTH CARE EDUCATION/TRAINING PROGRAM

## 2022-10-27 PROCEDURE — G8427 DOCREV CUR MEDS BY ELIG CLIN: HCPCS | Performed by: STUDENT IN AN ORGANIZED HEALTH CARE EDUCATION/TRAINING PROGRAM

## 2022-10-27 PROCEDURE — 99214 OFFICE O/P EST MOD 30 MIN: CPT | Performed by: STUDENT IN AN ORGANIZED HEALTH CARE EDUCATION/TRAINING PROGRAM

## 2022-10-27 RX ORDER — METOPROLOL SUCCINATE 200 MG/1
200 TABLET, EXTENDED RELEASE ORAL DAILY
Qty: 90 TABLET | Refills: 1 | Status: SHIPPED | OUTPATIENT
Start: 2022-10-27

## 2022-10-27 RX ORDER — SPIRONOLACTONE 25 MG/1
25 TABLET ORAL 2 TIMES DAILY
Qty: 180 TABLET | Refills: 1 | Status: SHIPPED | OUTPATIENT
Start: 2022-10-27

## 2022-10-27 RX ORDER — ATORVASTATIN CALCIUM 40 MG/1
40 TABLET, FILM COATED ORAL DAILY
Qty: 90 TABLET | Refills: 1 | Status: SHIPPED | OUTPATIENT
Start: 2022-10-27

## 2022-10-27 RX ORDER — ASPIRIN 325 MG
325 TABLET ORAL DAILY
Qty: 90 TABLET | Refills: 1 | Status: SHIPPED | OUTPATIENT
Start: 2022-10-27

## 2022-10-27 RX ORDER — ISOSORBIDE MONONITRATE 30 MG/1
30 TABLET, EXTENDED RELEASE ORAL DAILY
Qty: 90 TABLET | Refills: 1 | Status: SHIPPED | OUTPATIENT
Start: 2022-10-27

## 2022-10-27 RX ORDER — AMLODIPINE BESYLATE 10 MG/1
TABLET ORAL
Qty: 90 TABLET | Refills: 1 | Status: SHIPPED | OUTPATIENT
Start: 2022-10-27

## 2022-10-27 RX ORDER — POTASSIUM CHLORIDE 20 MEQ/1
20 TABLET, EXTENDED RELEASE ORAL DAILY
Qty: 5 TABLET | Refills: 0 | Status: CANCELLED | OUTPATIENT
Start: 2022-10-27

## 2022-10-27 RX ORDER — CALCIUM CARBONATE 750 MG/1
1 TABLET, CHEWABLE ORAL 2 TIMES DAILY
Qty: 1 KIT | Refills: 0 | Status: CANCELLED | OUTPATIENT
Start: 2022-10-27

## 2022-10-27 RX ORDER — ERGOCALCIFEROL 1.25 MG/1
50000 CAPSULE ORAL
Qty: 12 CAPSULE | Refills: 1 | Status: CANCELLED | OUTPATIENT
Start: 2022-10-27

## 2022-10-27 RX ORDER — PRAVASTATIN SODIUM 80 MG/1
80 TABLET ORAL DAILY
Qty: 90 TABLET | Refills: 1 | Status: CANCELLED | OUTPATIENT
Start: 2022-10-27

## 2022-10-27 NOTE — PATIENT INSTRUCTIONS
Learning About Benefits From Quitting Smoking  Why is it important to quit smoking? If you're thinking about quitting smoking, you may have a few reasons to be smoke-free. Your health may be one of them. When you quit smoking, you lower your risk for many serious health problems, such as cancer, lung disease, heart attack, stroke, blood vessel disease, and blindness from macular degeneration. When you're smoke-free, you get sick less often, and you heal faster. You are less likely to get colds, flu, bronchitis, and pneumonia. As a nonsmoker, you may find that your mood is better and you are less stressed. When and how will you feel healthier? Quitting has real health benefits that start from day 1 of being smoke-free. And the longer you stay smoke-free, the healthier you get and the better you feel. The first hours  After just 20 minutes, your blood pressure and heart rate go down. That means there's less stress on your heart and blood vessels. Within 12 hours, the level of carbon monoxide in your blood drops back to normal. That makes room for more oxygen. With more oxygen in your body, you may notice that you have more energy than when you smoked. After 2 weeks  Your lungs start to work better. Your risk of heart attack starts to drop. After 1 month  When your lungs are clear, you cough less and breathe deeper, so it's easier to be active. Your sense of taste and smell return. That means you can enjoy food more than you have since you started smoking. Over the years  Over the years, your risks of heart disease, heart attack, and stroke are lower. After 10 years, your risk of dying from lung cancer is cut by about half. And your risk for many other types of cancer is lower too. How would quitting help others in your life? When you quit smoking, you improve the health of everyone who now breathes in your smoke. Their heart, lung, and cancer risks drop, much like yours. They are sick less. For babies and small children, living smoke-free means they're less likely to have ear infections, pneumonia, and bronchitis. If you're a woman who is or will be pregnant someday, quitting smoking means a healthier . Children who are close to you are less likely to become adult smokers. Where can you learn more? Go to http://www.gray.com/  Enter O319 in the search box to learn more about \"Learning About Benefits From Quitting Smoking. \"  Current as of: 2021               Content Version: 13.4   Healthwise, [employer]. Care instructions adapted under license by [employer] (which disclaims liability or warranty for this information). If you have questions about a medical condition or this instruction, always ask your healthcare professional. [address] any warranty or liability for your use of this information.

## 2022-10-27 NOTE — PROGRESS NOTES
Joy Coleman is a 67 y.o. male presenting today for Establish Care  . Chief Complaint   Patient presents with    Establish Care       HPI:  Joy Coleman presents to the office today to establish care. Patient has a PMHx of HTN, HLD, CAD, tobacco abuse, history of alcohol abuse. CAD: Patient follows with cardiology: Dr. Felice Van. HTN: Takes amlodipine, Imdur, metoprolol and Aldactone. HLD: Lipid panel in 2/22 showed .8, HDL 37, triglyceride 96. He is prescribed pravastatin 80 mg daily. BMP in 2/22 showed hypokalemia with potassium 3.3    He underwent CT lung cancer screen in 4/22 which was negative but showed severe coronary arteriosclerosis. Also noted to have mild bronchitis and emphysema. Wt loss: Last visit with his PCP in 4/22, patient was complaining of unintentional weight loss. CT lung cancer screening was ordered and he was referred to GI for colon cancer screening. Has an appointment scheduled for December. Patient states his average wt was 190s. His wt has slowly been decreasing. Smoker: Patient is a heavy smoker. He smokes 1.5 ppd since 4 years. He started smoking at 13 yrs of age around 0.5-1 ppd. He has not tried to quit. History of alcohol abuse: Patient abused alcohol for many years. He quit alcohol 2 years ago. Review of Systems   Constitutional:  Positive for weight loss. Negative for chills, diaphoresis, fever and malaise/fatigue. HENT:  Negative for congestion, ear discharge, ear pain, hearing loss, nosebleeds, sinus pain, sore throat and tinnitus. Eyes:  Negative for blurred vision, double vision and photophobia. Respiratory:  Negative for cough, sputum production, shortness of breath, wheezing and stridor. Cardiovascular:  Negative for chest pain, palpitations, orthopnea, claudication and leg swelling. Gastrointestinal:  Negative for abdominal pain, blood in stool, constipation, diarrhea, heartburn, nausea and vomiting. Genitourinary:  Negative for dysuria, flank pain, frequency, hematuria and urgency. Musculoskeletal:  Negative for back pain, joint pain, myalgias and neck pain. Skin:  Negative for rash. Neurological:  Negative for tingling, tremors, sensory change, speech change, focal weakness, seizures, weakness and headaches. Psychiatric/Behavioral:  Negative for depression. The patient is not nervous/anxious. All other systems reviewed and are negative. Allergies   Allergen Reactions    Lisinopril Anaphylaxis and Swelling     Swelling of tongue,  angioedema       PHQ Screening   3 most recent PHQ Screens 10/27/2022   Little interest or pleasure in doing things Not at all   Feeling down, depressed, irritable, or hopeless Not at all   Total Score PHQ 2 0       History  Past Medical History:   Diagnosis Date    CAD (coronary artery disease)     Cardiac catheterization 03/23/2001    RCA patent. m/dRPDA 55%. LM patent. LAD patent. CX patent. oOMB 75%. LVEDP 8-10 mmHg. EF 50%. Global hypk. Patent bilateral renal arteries. Cardiac echocardiogram 10/24/2012    EF 55-60%. No WMA. Mild conc LVH. Gr 1 DDfx. RVSP 20-25 mmHg. Mild LAE. Mild ADAM. Possible sm pericardial effusion but likely fat pad. Cardiac nuclear imaging test, low risk 10/24/2012    No ischemia or prior infarction. EF 58%. Normal EKG on max EST. Ex time 6 min. Low risk.     History of alcoholism ([facility] [address]     Personal history      Hypercholesteremia     Hypertension     Hypertrophy (benign) of prostate     Without urinary obstruction and other lower urinary tract symptoms (LUTS)    Mixed hyperlipidemia        Past Surgical History:   Procedure Laterality Date    HX COLONOSCOPY  10/2015    neg; h/o polyps    HX HEART CATHETERIZATION  03/23/2001    HX OTHER SURGICAL      sgy to correct club foot       Social History     Socioeconomic History    Marital status: SINGLE     Spouse name: Not on file    Number of children: Not on file    Years of education: Not on file    Highest education level: Not on file   Occupational History    Not on file   Tobacco Use    Smoking status: Every Day     Packs/day: 2.50     Types: Cigarettes    Smokeless tobacco: Never   Substance and Sexual Activity    Alcohol use: Yes     Comment: 10 beers a day    Drug use: No    Sexual activity: Not Currently     Partners: Female   Other Topics Concern    Not on file   Social History Narrative    Not on file     Social Determinants of Health     Financial Resource Strain: Not on file   Food Insecurity: Not on file   Transportation Needs: Not on file   Physical Activity: Not on file   Stress: Not on file   Social Connections: Not on file   Intimate Partner Violence: Not on file   Housing Stability: Not on file       Current Outpatient Medications   Medication Sig Dispense Refill    amLODIPine (NORVASC) 10 mg tablet TAKE 1 TABLET BY MOUTH ONCE DAILY FOR HIGH BLOOD PRESSURE 90 Tablet 1    aspirin (ASPIRIN) 325 mg tablet Take 1 Tablet by mouth daily. 90 Tablet 1    spironolactone (ALDACTONE) 25 mg tablet Take 1 Tablet by mouth two (2) times a day. 180 Tablet 1    metoprolol succinate (TOPROL-XL) 200 mg XL tablet Take 1 Tablet by mouth daily. 90 Tablet 1    isosorbide mononitrate ER (IMDUR) 30 mg tablet Take 1 Tablet by mouth daily. 90 Tablet 1    atorvastatin (LIPITOR) 40 mg tablet Take 1 Tablet by mouth daily. 90 Tablet 1    ergocalciferol (ERGOCALCIFEROL) 1,250 mcg (50,000 unit) capsule Take 1 Capsule by mouth every seven (7) days. Indications: vitamin D deficiency (high dose therapy) 12 Capsule 1    Blood Pressure Test Kit-Medium kit 1 Kit by Does Not Apply route two (2) times a day. 1 Kit 0    omega-3 fatty acids-vitamin e 1,000 mg cap Take 1 capsule by mouth as needed. potassium chloride (K-DUR, KLOR-CON M20) 20 mEq tablet Take 1 Tablet by mouth daily.  X 5 DAYS (Patient not taking: Reported on 10/27/2022) 5 Tablet 0         Vitals:    10/27/22 0930   BP: (!) 144/78 Pulse: 60   Resp: 13   SpO2: 99%   Weight: 169 lb (76.7 kg)   Height: 5' 10\" (1.778 m)   PainSc:   0 - No pain       Physical Exam  Vitals and nursing note reviewed. Constitutional:       General: He is not in acute distress. Appearance: Normal appearance. He is normal weight. He is not ill-appearing, toxic-appearing or diaphoretic. HENT:      Head: Normocephalic and atraumatic. Eyes:      General: No scleral icterus. Extraocular Movements: Extraocular movements intact. Conjunctiva/sclera: Conjunctivae normal.      Pupils: Pupils are equal, round, and reactive to light. Cardiovascular:      Rate and Rhythm: Normal rate and regular rhythm. Pulses: Normal pulses. Heart sounds: Normal heart sounds. No murmur heard. Pulmonary:      Effort: Pulmonary effort is normal. No respiratory distress. Breath sounds: Normal breath sounds. No wheezing or rales. Musculoskeletal:         General: No swelling or tenderness. Normal range of motion. Cervical back: Normal range of motion and neck supple. Right lower leg: No edema. Left lower leg: No edema. Skin:     General: Skin is warm and dry. Coloration: Skin is not jaundiced or pale. Neurological:      General: No focal deficit present. Mental Status: He is alert and oriented to person, place, and time. Mental status is at baseline. Cranial Nerves: No cranial nerve deficit. Motor: No weakness. Gait: Gait normal.   Psychiatric:         Mood and Affect: Mood normal.         Behavior: Behavior normal.         Thought Content: Thought content normal.         Judgment: Judgment normal.       No visits with results within 3 Month(s) from this visit.    Latest known visit with results is:   Hospital Outpatient Visit on 02/18/2022   Component Date Value Ref Range Status    LIPID PROFILE 02/18/2022        Final    Cholesterol, total 02/18/2022 204 (A)  <200 MG/DL Final    Triglyceride 02/18/2022 96  <150 MG/DL Final    Comment: The drugs N-acetylcysteine (NAC) and  Metamiszole have been found to cause falsely  low results in this chemical assay. Please  be sure to submit blood samples obtained  BEFORE administration of either of these  drugs to assure correct results. HDL Cholesterol 02/18/2022 37 (A)  40 - 60 MG/DL Final    LDL, calculated 02/18/2022 147.8 (A)  0 - 100 MG/DL Final    VLDL, calculated 02/18/2022 19.2  MG/DL Final    CHOL/HDL Ratio 02/18/2022 5.5 (A)  0 - 5.0   Final    Sodium 02/18/2022 139  136 - 145 mmol/L Final    Potassium 02/18/2022 3.3 (A)  3.5 - 5.5 mmol/L Final    Chloride 02/18/2022 105  100 - 111 mmol/L Final    CO2 02/18/2022 25  21 - 32 mmol/L Final    Anion gap 02/18/2022 9  3.0 - 18 mmol/L Final    Glucose 02/18/2022 85  74 - 99 mg/dL Final    BUN 02/18/2022 7  7.0 - 18 MG/DL Final    Creatinine 02/18/2022 0.73  0.6 - 1.3 MG/DL Final    BUN/Creatinine ratio 02/18/2022 10 (A)  12 - 20   Final    GFR est AA 02/18/2022 >60  >60 ml/min/1.73m2 Final    GFR est non-AA 02/18/2022 >60  >60 ml/min/1.73m2 Final    Comment: (NOTE)  Estimated GFR is calculated using the Modification of Diet in Renal   Disease (MDRD) Study equation, reported for both  Americans   (GFRAA) and non- Americans (GFRNA), and normalized to 1.73m2   body surface area. The physician must decide which value applies to   the patient. The MDRD study equation should only be used in   individuals age 25 or older. It has not been validated for the   following: pregnant women, patients with serious comorbid conditions,   or on certain medications, or persons with extremes of body size,   muscle mass, or nutritional status. Calcium 02/18/2022 9.4  8.5 - 10.1 MG/DL Final    Bilirubin, total 02/18/2022 0.7  0.2 - 1.0 MG/DL Final    ALT (SGPT) 02/18/2022 12 (A)  16 - 61 U/L Final    AST (SGOT) 02/18/2022 37  10 - 38 U/L Final    Alk.  phosphatase 02/18/2022 96  45 - 117 U/L Final    Protein, total 02/18/2022 7.7 6.4 - 8.2 g/dL Final    Albumin 02/18/2022 3.9  3.4 - 5.0 g/dL Final    Globulin 02/18/2022 3.8  2.0 - 4.0 g/dL Final    A-G Ratio 02/18/2022 1.0  0.8 - 1.7   Final    WBC 02/18/2022 4.6  4.6 - 13.2 K/uL Final    RBC 02/18/2022 4.79  4.35 - 5.65 M/uL Final    HGB 02/18/2022 12.3 (A)  13.0 - 16.0 g/dL Final    HCT 02/18/2022 39.8  36.0 - 48.0 % Final    MCV 02/18/2022 83.1  78.0 - 100.0 FL Final    MCH 02/18/2022 25.7  24.0 - 34.0 PG Final    MCHC 02/18/2022 30.9 (A)  31.0 - 37.0 g/dL Final    RDW 02/18/2022 15.5 (A)  11.6 - 14.5 % Final    PLATELET 68/23/0159 864  135 - 420 K/uL Final    MPV 02/18/2022 13.0 (A)  9.2 - 11.8 FL Final    NRBC 02/18/2022 0.0  0  WBC Final    ABSOLUTE NRBC 02/18/2022 0.00  0.00 - 0.01 K/uL Final    NEUTROPHILS 02/18/2022 49  40 - 73 % Final    LYMPHOCYTES 02/18/2022 38  21 - 52 % Final    MONOCYTES 02/18/2022 10  3 - 10 % Final    EOSINOPHILS 02/18/2022 2  0 - 5 % Final    BASOPHILS 02/18/2022 1  0 - 2 % Final    IMMATURE GRANULOCYTES 02/18/2022 0  0.0 - 0.5 % Final    ABS. NEUTROPHILS 02/18/2022 2.2  1.8 - 8.0 K/UL Final    ABS. LYMPHOCYTES 02/18/2022 1.7  0.9 - 3.6 K/UL Final    ABS. MONOCYTES 02/18/2022 0.5  0.05 - 1.2 K/UL Final    ABS. EOSINOPHILS 02/18/2022 0.1  0.0 - 0.4 K/UL Final    ABS. BASOPHILS 02/18/2022 0.1  0.0 - 0.1 K/UL Final    ABS. IMM. GRANS. 02/18/2022 0.0  0.00 - 0.04 K/UL Final    DF 02/18/2022 AUTOMATED    Final    Prostate Specific Ag 02/18/2022 0.7  0.0 - 4.0 ng/mL Final       No results found for any visits on 10/27/22. Patient Care Team:  Patient Care Team:  Mak Holt MD as PCP - General (Internal Medicine Physician)      Assessment / Plan:      ICD-10-CM ICD-9-CM    1.  Essential hypertension  I10 401.9 amLODIPine (NORVASC) 10 mg tablet      spironolactone (ALDACTONE) 25 mg tablet      metoprolol succinate (TOPROL-XL) 200 mg XL tablet      isosorbide mononitrate ER (IMDUR) 30 mg tablet      CBC WITH AUTOMATED DIFF      METABOLIC PANEL, COMPREHENSIVE      2. SOB (shortness of breath)  R06.02 786.05 aspirin (ASPIRIN) 325 mg tablet      3. CAD (coronary artery disease)  I25.10 414.00 aspirin (ASPIRIN) 325 mg tablet      LIPID PANEL      atorvastatin (LIPITOR) 40 mg tablet      4. Vitamin D deficiency  E55.9 268.9 VITAMIN D, 25 HYDROXY      5. Mixed hyperlipidemia  E78.2 272.2       6. Hypokalemia  E87.6 276.8       7. History of alcoholism ([facility] [address]  F10.21 V11.3         HTN: Continue amlodipine, Imdur, metoprolol, Aldactone. CAD: Following with cardiology. CT lung showed severe arteriosclerosis. Will change from pravastatin to Lipitor. Continue aspirin. Patient denies any chest pain. HLD: Switched over to Lipitor. Counseled on taking his medication daily. Patient had not been taking his statin. History of vitamin D deficiency: Hold off on further refills of ergocalciferol. Check repeat level. Weight loss: Patient with unintentional weight loss since the past few months. His wt has steadily declining without trying. CT lung cancer screen was negative for malignancy. He has a colonoscopy scheduled for December. Will check CT AP to rule out occult malignancy specially given his extensive smoking and alcohol abuse history. Tobacco abuse: Patient counseled on cessation. Not ready to quit at this time. Refused PCV 20 vaccine and flu vaccine today. Will readdress at next visit. Follow-up and Dispositions    Return in about 8 weeks (around 12/22/2022) for Medicare Wellness, 30 mins. I asked the patient if he  had any questions and answered his  questions. The patient stated that he understands the treatment plan and agrees with the treatment plan    This document was created with a voice activated dictation system and may contain transcription errors.

## 2022-11-18 ENCOUNTER — HOSPITAL ENCOUNTER (OUTPATIENT)
Dept: CT IMAGING | Age: 72
Discharge: HOME | End: 2022-11-18
Attending: STUDENT IN AN ORGANIZED HEALTH CARE EDUCATION/TRAINING PROGRAM
Payer: MEDICARE

## 2022-11-18 DIAGNOSIS — R63.4 RECENT UNINTENTIONAL WEIGHT LOSS OVER SEVERAL MONTHS: ICD-10-CM

## 2022-11-18 LAB — CREAT UR-MCNC: 0.7 MG/DL (ref 0.6–1.3)

## 2022-11-18 PROCEDURE — 74177 CT ABD & PELVIS W/CONTRAST: CPT

## 2022-11-18 PROCEDURE — 74011000636 HC RX REV CODE- 636: Performed by: STUDENT IN AN ORGANIZED HEALTH CARE EDUCATION/TRAINING PROGRAM

## 2022-11-18 PROCEDURE — 82565 ASSAY OF CREATININE: CPT

## 2022-11-18 RX ADMIN — DIATRIZOATE MEGLUMINE AND DIATRIZOATE SODIUM 30 ML: 660; 100 LIQUID ORAL; RECTAL at 12:49

## 2022-11-18 RX ADMIN — IOPAMIDOL 79 ML: 612 INJECTION, SOLUTION INTRAVENOUS at 14:15

## 2022-12-01 NOTE — PERIOP NOTES
PRE-SURGICAL INSTRUCTIONS        Patient's Name:  Dmitri Ibanez      SXJPI'C Date:  12/1/2022            Covid Testing Date and Time:    Surgery Date:  12/7/2022                Do NOT eat or drink anything, including candy, gum, or ice chips after midnight on 12/7, unless you have specific instructions from your surgeon or anesthesia provider to do so. You may brush your teeth before coming to the hospital.  No smoking 24 hours prior to the day of surgery. No alcohol 24 hours prior to the day of surgery. No recreational drugs for one week prior to the day of surgery. Leave all valuables, including money/purse, at home. Remove all jewelry, nail polish, acrylic nails, and makeup (including mascara); no lotions powders, deodorant, or perfume/cologne/after shave on the skin. Follow instruction for Hibiclens washes and CHG wipes from surgeon's office. Glasses/contact lenses and dentures may be worn to the hospital.  They will be removed prior to surgery. Call your doctor if symptoms of a cold or illness develop within 24-48 hours prior to your surgery. 11.  If you are having an outpatient procedure, please make arrangements for a responsible ADULT TO [address] [place] [address] and stay with you for 24 hours after your surgery. 12. ONE VISITOR in the hospital at this time for outpatient procedures. Exceptions may be made for surgical admissions, per nursing unit guidelines      Special Instructions:      Bring list of CURRENT medications. Bring any pertinent legal medical records. Take these medications the morning of surgery with a sip of water:  per office         On the day of surgery, come in the main entrance of [facility]. Let the  at the desk know you are there for surgery. A staff member will come escort you to the surgical area on the second floor.     If you have any questions or concerns, please do not hesitate to call:     (Prior to the day of surgery) [facility] department:  [phone]   (Day of surgery) Pre-Op department:  [phone]    These surgical instructions were reviewed with the patient during the PAT phone call.

## 2022-12-06 ENCOUNTER — ANESTHESIA EVENT (OUTPATIENT)
Dept: ENDOSCOPY | Age: 72
End: 2022-12-06
Payer: MEDICARE

## 2022-12-07 ENCOUNTER — HOSPITAL ENCOUNTER (OUTPATIENT)
Age: 72
Setting detail: OUTPATIENT SURGERY
Discharge: HOME | End: 2022-12-07
Attending: INTERNAL MEDICINE | Admitting: INTERNAL MEDICINE
Payer: MEDICARE

## 2022-12-07 ENCOUNTER — ANESTHESIA (OUTPATIENT)
Dept: ENDOSCOPY | Age: 72
End: 2022-12-07
Payer: MEDICARE

## 2022-12-07 VITALS
TEMPERATURE: 98.8 F | BODY MASS INDEX: 23.66 KG/M2 | HEART RATE: 78 BPM | OXYGEN SATURATION: 100 % | WEIGHT: 169 LBS | DIASTOLIC BLOOD PRESSURE: 66 MMHG | SYSTOLIC BLOOD PRESSURE: 153 MMHG | RESPIRATION RATE: 18 BRPM | HEIGHT: 71 IN

## 2022-12-07 LAB — POTASSIUM SERPL-SCNC: 3.9 MMOL/L (ref 3.5–5.5)

## 2022-12-07 PROCEDURE — 77030021593 HC FCPS BIOP ENDOSC BSC -A: Performed by: INTERNAL MEDICINE

## 2022-12-07 PROCEDURE — 76060000031 HC ANESTHESIA FIRST 0.5 HR: Performed by: INTERNAL MEDICINE

## 2022-12-07 PROCEDURE — 2709999900 HC NON-CHARGEABLE SUPPLY: Performed by: INTERNAL MEDICINE

## 2022-12-07 PROCEDURE — 74011250636 HC RX REV CODE- 250/636: Performed by: ANESTHESIOLOGY

## 2022-12-07 PROCEDURE — 76040000019: Performed by: INTERNAL MEDICINE

## 2022-12-07 PROCEDURE — 77030008565 HC TBNG SUC IRR ERBE -B: Performed by: INTERNAL MEDICINE

## 2022-12-07 PROCEDURE — 74011250636 HC RX REV CODE- 250/636: Performed by: NURSE ANESTHETIST, CERTIFIED REGISTERED

## 2022-12-07 PROCEDURE — 77030013992 HC SNR POLYP ENDOSC BSC -B: Performed by: INTERNAL MEDICINE

## 2022-12-07 PROCEDURE — 84132 ASSAY OF SERUM POTASSIUM: CPT

## 2022-12-07 PROCEDURE — 74011000250 HC RX REV CODE- 250: Performed by: ANESTHESIOLOGY

## 2022-12-07 PROCEDURE — 88305 TISSUE EXAM BY PATHOLOGIST: CPT

## 2022-12-07 RX ORDER — LIDOCAINE HYDROCHLORIDE 20 MG/ML
INJECTION, SOLUTION EPIDURAL; INFILTRATION; INTRACAUDAL; PERINEURAL AS NEEDED
Status: DISCONTINUED | OUTPATIENT
Start: 2022-12-07 | End: 2022-12-07 | Stop reason: HOSPADM

## 2022-12-07 RX ORDER — FENTANYL CITRATE 50 UG/ML
50 INJECTION, SOLUTION INTRAMUSCULAR; INTRAVENOUS
Status: DISCONTINUED | OUTPATIENT
Start: 2022-12-07 | End: 2022-12-07 | Stop reason: HOSPADM

## 2022-12-07 RX ORDER — FAMOTIDINE 20 MG/1
20 TABLET, FILM COATED ORAL ONCE
Status: DISCONTINUED | OUTPATIENT
Start: 2022-12-07 | End: 2022-12-07 | Stop reason: HOSPADM

## 2022-12-07 RX ORDER — INSULIN LISPRO 100 [IU]/ML
INJECTION, SOLUTION INTRAVENOUS; SUBCUTANEOUS ONCE
Status: DISCONTINUED | OUTPATIENT
Start: 2022-12-07 | End: 2022-12-07 | Stop reason: HOSPADM

## 2022-12-07 RX ORDER — EPHEDRINE SULFATE/0.9% NACL/PF 50 MG/5 ML
SYRINGE (ML) INTRAVENOUS AS NEEDED
Status: DISCONTINUED | OUTPATIENT
Start: 2022-12-07 | End: 2022-12-07 | Stop reason: HOSPADM

## 2022-12-07 RX ORDER — PROPOFOL 10 MG/ML
INJECTION, EMULSION INTRAVENOUS AS NEEDED
Status: DISCONTINUED | OUTPATIENT
Start: 2022-12-07 | End: 2022-12-07 | Stop reason: HOSPADM

## 2022-12-07 RX ORDER — SODIUM CHLORIDE, SODIUM LACTATE, POTASSIUM CHLORIDE, CALCIUM CHLORIDE 600; 310; 30; 20 MG/100ML; MG/100ML; MG/100ML; MG/100ML
50 INJECTION, SOLUTION INTRAVENOUS CONTINUOUS
Status: DISCONTINUED | OUTPATIENT
Start: 2022-12-07 | End: 2022-12-07 | Stop reason: HOSPADM

## 2022-12-07 RX ADMIN — PROPOFOL 20 MG: 10 INJECTION, EMULSION INTRAVENOUS at 13:04

## 2022-12-07 RX ADMIN — Medication 10 MG: at 13:17

## 2022-12-07 RX ADMIN — PROPOFOL 20 MG: 10 INJECTION, EMULSION INTRAVENOUS at 13:11

## 2022-12-07 RX ADMIN — Medication 5 MG: at 13:11

## 2022-12-07 RX ADMIN — Medication 10 MG: at 13:13

## 2022-12-07 RX ADMIN — PROPOFOL 40 MG: 10 INJECTION, EMULSION INTRAVENOUS at 13:13

## 2022-12-07 RX ADMIN — PROPOFOL 100 MG: 10 INJECTION, EMULSION INTRAVENOUS at 13:02

## 2022-12-07 RX ADMIN — PROPOFOL 20 MG: 10 INJECTION, EMULSION INTRAVENOUS at 13:08

## 2022-12-07 RX ADMIN — SODIUM CHLORIDE, SODIUM LACTATE, POTASSIUM CHLORIDE, AND CALCIUM CHLORIDE: 600; 310; 30; 20 INJECTION, SOLUTION INTRAVENOUS at 12:58

## 2022-12-07 RX ADMIN — LIDOCAINE HYDROCHLORIDE 100 MG: 20 INJECTION, SOLUTION EPIDURAL; INFILTRATION; INTRACAUDAL; PERINEURAL at 13:02

## 2022-12-07 NOTE — H&P
[email or web address] [address]  [place], [address]        Impression:   1.hx polypos      Plan:     1. [place] [employer] [place] riks[employer] benefits and alt discussed       Chief Complaint: hx polyps      HPI:  Maddy Lockett is a 67 y.o. male who is being seen on consult for hx of polyps . PMH:   Past Medical History:   Diagnosis Date    CAD (coronary artery disease)     Cardiac catheterization 03/23/2001    RCA patent. m/dRPDA 55%. LM patent. LAD patent. CX patent. oOMB 75%. LVEDP 8-10 mmHg. EF 50%. Global hypk. Patent bilateral renal arteries. Cardiac echocardiogram 10/24/2012    EF 55-60%. No WMA. Mild conc LVH. Gr 1 DDfx. RVSP 20-25 mmHg. Mild LAE. Mild ADAM. Possible sm pericardial effusion but likely fat pad. Cardiac nuclear imaging test, low risk 10/24/2012    No ischemia or prior infarction. EF 58%. Normal EKG on max EST. Ex time 6 min. Low risk.     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       Social HX:   Social History     Socioeconomic History    Marital status: SINGLE     Spouse name: Not on file    Number of children: Not on file    Years of education: Not on file    Highest education level: Not on file   Occupational History    Not on file   Tobacco Use    Smoking status: Every Day     Types: Cigarettes    Smokeless tobacco: Never    Tobacco comments:     Smokes 2 packs per week   Substance and Sexual Activity    Alcohol use: Not Currently     Comment: 12/2022-quit 2 yrs ago    Drug use: No    Sexual activity: Not Currently     Partners: Female   Other Topics Concern    Not on file   Social History Narrative    Not on file     Social Determinants of Health     Financial Resource Strain: Not on file   Food Insecurity: Not on file   Transportation Needs: Not on file   Physical Activity: Not on file   Stress: Not on file   Social Connections: Not on file   Intimate Partner Violence: Not on file   Housing Stability: Not on file       FHX:   Family History   Problem Relation Age of Onset    Heart Disease Mother     Heart Surgery Mother     Dementia Father        Allergy:   Allergies   Allergen Reactions    Lisinopril Anaphylaxis and Swelling     Swelling of tongue,  angioedema       Home Medications:     Medications Prior to Admission   Medication Sig    amLODIPine (NORVASC) 10 mg tablet TAKE 1 TABLET BY MOUTH ONCE DAILY FOR HIGH BLOOD PRESSURE    aspirin (ASPIRIN) 325 mg tablet Take 1 Tablet by mouth daily. spironolactone (ALDACTONE) 25 mg tablet Take 1 Tablet by mouth two (2) times a day. metoprolol succinate (TOPROL-XL) 200 mg XL tablet Take 1 Tablet by mouth daily. isosorbide mononitrate ER (IMDUR) 30 mg tablet Take 1 Tablet by mouth daily. atorvastatin (LIPITOR) 40 mg tablet Take 1 Tablet by mouth daily. potassium chloride (K-DUR, KLOR-CON M20) 20 mEq tablet Take 1 Tablet by mouth daily. X 5 DAYS    ergocalciferol (ERGOCALCIFEROL) 1,250 mcg (50,000 unit) capsule Take 1 Capsule by mouth every seven (7) days. Indications: vitamin D deficiency (high dose therapy)    omega-3 fatty acids-vitamin e 1,000 mg cap Take 1 capsule by mouth as needed. Blood Pressure Test Kit-Medium kit 1 Kit by Does Not Apply route two (2) times a day. Review of Systems:     Constitutional: No fevers, chills, weight loss, fatigue. Skin: No rashes, pruritis, jaundice, ulcerations, erythema. HENT: No headaches, nosebleeds, sinus pressure, rhinorrhea, sore throat. Eyes: No visual changes, blurred vision, eye pain, photophobia, jaundice. Cardiovascular: No chest pain, heart palpitations. Respiratory: No cough, SOB, wheezing, chest discomfort, orthopnea. Gastrointestinal: neg   Genitourinary: No dysuria, bleeding, discharge, pyuria. Musculoskeletal: No weakness, arthralgias, wasting. Endo: No sweats. Heme: No bruising, easy bleeding. Allergies: As noted. Neurological: Cranial nerves intact. Alert and oriented. Gait not assessed. Psychiatric:  No anxiety, depression, hallucinations. Visit Vitals  BP (!) 150/76 (BP 1 Location: Left upper arm, BP Patient Position: At rest)   Pulse 67   Temp 98.2 °F (36.8 °C)   Resp 18   Ht 5' 11\" (1.803 m)   Wt 76.7 kg (169 lb)   SpO2 98%   BMI 23.57 kg/m²       Physical Assessment:     constitutional: appearance: well developed, well nourished, normal habitus, no deformities, in no acute distress. skin: inspection: no rashes, ulcers, icterus or other lesions; no clubbing or telangiectasias. palpation: no induration or subcutaneos nodules. eyes: inspection: normal conjunctivae and lids; no jaundice pupils: symmetrical, normoreactive to light, normal accommodation and size. ENMT: mouth: normal oral mucosa,lips and gums; good dentition. oropharynx: normal tongue, hard and soft palate; posterior pharynx without erythema, exudate or lesions. neck: no masses organomegaly or tenderness. respiratory: effort: normal chest excursion; no intercostal retraction or accessory muscle use. cardiovascular: abdominal aorta: normal size and position; no bruits. palpation: PMI of normal size and position; normal rhythm; no thrill or murmurs. abdominal: abdomen: normal consistency; no tenderness or masses. hernias: no hernias appreciated. liver: normal size and consistency. spleen: not palpable. rectal: hemoccult/guaiac: not performed. musculoskeletal: no deformities or muscle wasting   lymphatic: axilae: not palpable. groin: not palpable. neck: within normal limits. other: not palpable. neurologic: cranial nerves: II-XII normal.   psychiatric: judgement/insight: within normal limits. memory: within normal limits for recent and remote events. mood and affect: no evidence of depression, anxiety or agitation. orientation: oriented to time, space and person. Basic Metabolic Profile   No results for input(s): NA, K, CL, CO2, BUN, GLU, CA, MG, PHOS in the last 72 hours. No lab exists for component: CREAT      CBC w/Diff    No results for input(s): WBC, RBC, HGB, HCT, MCV, MCH, MCHC, RDW, PLT, HGBEXT, HCTEXT, PLTEXT in the last 72 hours. No lab exists for component: MPV No results for input(s): GRANS, LYMPH, EOS, PRO, MYELO, METAS, BLAST in the last 72 hours. No lab exists for component: MONO, BASO     Hepatic Function   No results for input(s): ALB, TP, TBILI, AP, AML, LPSE in the last 72 hours. No lab exists for component: DBILI, GPT, SGOT       Giovanna Harding MD, M.D. [facility], [address] [place] [address]  [email or web address]. [facility]

## 2022-12-07 NOTE — ANESTHESIA PREPROCEDURE EVALUATION
Relevant Problems   CARDIOVASCULAR   (+) CAD (coronary artery disease)   (+) Hypertension       Anesthetic History   No history of anesthetic complications            Review of Systems / Medical History  Patient summary reviewed and pertinent labs reviewed    Pulmonary  Within defined limits                 Neuro/Psych   Within defined limits           Cardiovascular    Hypertension          CAD         GI/Hepatic/Renal  Within defined limits              Endo/Other  Within defined limits           Other Findings              Physical Exam    Airway  Mallampati: II  TM Distance: 4 - 6 cm  Neck ROM: normal range of motion   Mouth opening: Normal     Cardiovascular  Regular rate and rhythm,  S1 and S2 normal,  no murmur, click, rub, or gallop             Dental    Dentition: Full lower dentures and Full upper dentures     Pulmonary  Breath sounds clear to auscultation               Abdominal  GI exam deferred       Other Findings            Anesthetic Plan    ASA: 3            Induction: Intravenous  Anesthetic plan and risks discussed with: Patient

## 2022-12-07 NOTE — PERIOP NOTES
IV D/C FROM FROM PTS LEFT HAND WITH CATH TIP INTACT. NO SIGNS OF REDNESS, SWELLING OR DRAINAGE FROM SITE. GAUZE DRESSING APPLIED TO LEFT HAND AND SECURED WITH TAPE.

## 2022-12-07 NOTE — ANESTHESIA POSTPROCEDURE EVALUATION
Procedure(s):  COLONOSCOPY with Polypectomies. MAC    Anesthesia Post Evaluation      Multimodal analgesia: multimodal analgesia used between 6 hours prior to anesthesia start to PACU discharge  Patient location during evaluation: PACU  Patient participation: complete - patient participated  Level of consciousness: awake and alert  Pain management: adequate  Airway patency: patent  Anesthetic complications: no  Cardiovascular status: acceptable  Respiratory status: acceptable  Hydration status: acceptable  Post anesthesia nausea and vomiting:  controlled  Final Post Anesthesia Temperature Assessment:  Normothermia (36.0-37.5 degrees C)      INITIAL Post-op Vital signs:   Vitals Value Taken Time   /80 12/07/22 1354   Temp 36.4 °C (97.5 °F) 12/07/22 1327   Pulse 86 12/07/22 1351   Resp 12 12/07/22 1355   SpO2 100 % 12/07/22 1355   Vitals shown include unvalidated device data.

## 2022-12-07 NOTE — DISCHARGE INSTRUCTIONS
Diverticulosis: Care Instructions  Your Care Instructions  In diverticulosis, pouches called diverticula form in the wall of the large intestine (colon). The pouches do not cause any pain or other symptoms. Most people who have diverticulosis do not know they have it. But the pouches sometimes bleed, and if they become infected, they can cause pain and other symptoms. When this happens, it is called diverticulitis. Diverticula form when pressure pushes the wall of the colon outward at certain weak points. A diet that is too low in fiber can cause diverticula. Follow-up care is a key part of your treatment and safety. Be sure to make and go to all appointments, and call your doctor if you are having problems. It's also a good idea to know your test results and keep a list of the medicines you take. How can you care for yourself at home? Include fruits, leafy green vegetables, beans, and whole grains in your diet each day. These foods are high in fiber. Take a fiber supplement, such as Citrucel or Metamucil, every day if needed. Read and follow all instructions on the label. Drink plenty of fluids. If you have kidney, heart, or liver disease and have to limit fluids, talk with your doctor before you increase the amount of fluids you drink. Get at least 30 minutes of exercise on most days of the week. Walking is a good choice. You also may want to do other activities, such as running, swimming, cycling, or playing tennis or team sports. Cut out foods that cause gas, pain, or other symptoms. When should you call for help? Call your doctor now or seek immediate medical care if:    You have belly pain. You pass maroon or very bloody stools. You have a fever. You have nausea and vomiting. You have unusual changes in your bowel movements or abdominal swelling. You have burning pain when you urinate. You have abnormal vaginal discharge. You have shoulder pain.      You have cramping pain that does not get better when you have a bowel movement or pass gas. You pass gas or stool from your urethra while urinating. Watch closely for changes in your health, and be sure to contact your doctor if you have any problems. Where can you learn more? Go to http://www.gray.com/  Enter [id number] in the search box to learn more about \"Diverticulosis: Care Instructions. \"  Current as of: June 6, 2022               Content Version: 13.4  © 2006-2022 [employer]. Care instructions adapted under license by [employer] (which disclaims liability or warranty for this information). If you have questions about a medical condition or this instruction, always ask your healthcare professional. [address] any warranty or liability for your use of this information. Colonoscopy: What to Expect at [address] [place] [address]  After a colonoscopy, you'll stay at the clinic until you wake up. Then you can go home. But you'll need to arrange for a ride. Your doctor will tell you when you can eat and do your other usual activities. Your doctor will talk to you about when you'll need your next colonoscopy. Your doctor can help you decide how often you need to be checked. This will depend on the results of your test and your risk for colorectal cancer. After the test, you may be bloated or have gas pains. You may need to pass gas. If a biopsy was done or a polyp was removed, you may have streaks of blood in your stool (feces) for a few days. Problems such as heavy rectal bleeding may not occur until several weeks after the test. This isn't common. But it can happen after polyps are removed. This care sheet gives you a general idea about how long it will take for you to recover. But each person recovers at a different pace. Follow the steps below to get better as quickly as possible. How can you care for yourself at home?   Activity    Rest when you feel tired. You can do your normal activities when it feels okay to do so. Diet    Follow your doctor's directions for eating. Unless your doctor has told you not to, drink plenty of fluids. This helps to replace the fluids that were lost during the colon prep. Do not drink alcohol. Medicines    Your doctor will tell you if and when you can restart your medicines. You will also be given instructions about taking any new medicines. If you stopped taking aspirin or some other blood thinner, your doctor will tell you when to start taking it again. If polyps were removed or a biopsy was done during the test, your doctor may tell you not to take aspirin or other anti-inflammatory medicines for a few days. These include ibuprofen (Advil, Motrin) and naproxen (Aleve). Other instructions    For your safety, do not drive or operate machinery until the medicine wears off and you can think clearly. Your doctor may tell you not to drive or operate machinery until the day after your test.     Do not sign legal documents or make major decisions until the medicine wears off and you can think clearly. The anesthesia can make it hard for you to fully understand what you are agreeing to. Follow-up care is a key part of your treatment and safety. Be sure to make and go to all appointments, and call your doctor if you are having problems. It's also a good idea to know your test results and keep a list of the medicines you take. When should you call for help? High-Fiber Diet: Care Instructions  Overview     A high-fiber diet may help you relieve constipation and feel less bloated. Your doctor and dietitian will help you make a high-fiber eating plan based on your personal needs. The plan will include the things you like to eat. It will also make sure that you get 25 to 35 grams of fiber a day. Before you make changes to the way you eat, be sure to talk with your doctor or dietitian.   Follow-up care is a key part of your treatment and safety. Be sure to make and go to all appointments, and call your doctor if you are having problems. It's also a good idea to know your test results and keep a list of the medicines you take. How can you care for yourself at home? You can increase how much fiber you get if you eat more of certain foods. These foods include:  Whole-grain breads and cereals. Fruits, such as pears, apples, and peaches. Eat the skins and peels if you can. Vegetables, such as broccoli, cabbage, spinach, carrots, asparagus, and squash. Starchy vegetables. These include potatoes with skins, kidney beans, and lima beans. Take a fiber supplement every day if your doctor recommends it. Examples are Benefiber, Citrucel, FiberCon, and Metamucil. Ask your doctor how much to take. Drink plenty of fluids. If you have kidney, heart, or liver disease and have to limit fluids, talk with your doctor before you increase the amount of fluids you drink. Where can you learn more? Go to http://www.gray.com/  Enter C163 in the search box to learn more about \"High-Fiber Diet: Care Instructions. \"  Current as of: May 9, 2022               Content Version: 13.4  © 2006-2022 [employer]. Care instructions adapted under license by [employer] (which disclaims liability or warranty for this information). If you have questions about a medical condition or this instruction, always ask your healthcare professional. [place] [address] any warranty or liability for your use of this information. Call 911 anytime you think you may need emergency care. For example, call if:    You passed out (lost consciousness). You pass maroon or bloody stools. You have trouble breathing. Call your doctor now or seek immediate medical care if:    You have pain that does not get better after you take pain medicine.      You are sick to your stomach or cannot drink fluids. You have new or worse belly pain. You have blood in your stools. You have a fever. You cannot pass stools or gas. Watch closely for changes in your health, and be sure to contact your doctor if you have any problems. Where can you learn more? Go to http://www.gray.com/  Enter E264 in the search box to learn more about \"Colonoscopy: What to Expect at Home. \"  Current as of: May 4, 2022               Content Version: 13.4  © 2006-2022 [employer]. Care instructions adapted under license by [employer] (which disclaims liability or warranty for this information). If you have questions about a medical condition or this instruction, always ask your healthcare professional. [address] any warranty or liability for your use of this information.

## 2022-12-23 ENCOUNTER — OFFICE VISIT (OUTPATIENT)
Dept: FAMILY MEDICINE CLINIC | Age: 72
End: 2022-12-23
Payer: MEDICARE

## 2022-12-23 VITALS
WEIGHT: 174 LBS | BODY MASS INDEX: 24.36 KG/M2 | HEIGHT: 71 IN | SYSTOLIC BLOOD PRESSURE: 150 MMHG | OXYGEN SATURATION: 99 % | DIASTOLIC BLOOD PRESSURE: 68 MMHG | RESPIRATION RATE: 14 BRPM | HEART RATE: 74 BPM

## 2022-12-23 DIAGNOSIS — I10 ESSENTIAL HYPERTENSION: ICD-10-CM

## 2022-12-23 DIAGNOSIS — Z72.0 TOBACCO ABUSE: ICD-10-CM

## 2022-12-23 DIAGNOSIS — Z00.00 MEDICARE ANNUAL WELLNESS VISIT, SUBSEQUENT: Primary | ICD-10-CM

## 2022-12-23 DIAGNOSIS — Z91.14 NON COMPLIANCE W MEDICATION REGIMEN: ICD-10-CM

## 2022-12-23 DIAGNOSIS — E78.2 MIXED HYPERLIPIDEMIA: ICD-10-CM

## 2022-12-23 NOTE — PROGRESS NOTES
This is the Subsequent Medicare Annual Wellness Exam, performed 12 months or more after the Initial AWV or the last Subsequent AWV    I have reviewed the patient's medical history in detail and updated the computerized patient record. Assessment/Plan   Education and counseling provided:  Are appropriate based on today's review and evaluation  Pneumococcal Vaccine    1. Medicare annual wellness visit, subsequent  2. Non compliance w medication regimen  3. Essential hypertension  4. Mixed hyperlipidemia  5. Tobacco abuse  -     PNEUMOCOCCAL, PCV20, PREVNAR 20, (AGE 18 YRS+), IM, PF     Patient has not been taking his medication regularly. Counseled on compliance. Had his colonoscopy recently-recall in 5 years. BP is elevated but he has not been taking his meds. Received PCV 20 vaccine today. Depression Risk Factor Screening     3 most recent PHQ Screens 12/23/2022   Little interest or pleasure in doing things Not at all   Feeling down, depressed, irritable, or hopeless Not at all   Total Score PHQ 2 0       Alcohol & Drug Abuse Risk Screen    Do you average more than 1 drink per night or more than 7 drinks a week: No    In the past three months have you have had more than 4 drinks containing alcohol on one occasion: No          Functional Ability and Level of Safety    Hearing: Hearing is good. Activities of Daily Living: The home contains: no safety equipment. Patient does total self care      Ambulation: with no difficulty     Fall Risk:  Fall Risk Assessment, last 12 mths 12/23/2022   Able to walk? Yes   Fall in past 12 months? 0   Do you feel unsteady?  0   Are you worried about falling 0      Abuse Screen:  Patient is not abused       Cognitive Screening    Has your family/caregiver stated any concerns about your memory: no     Cognitive Screening: Normal - Clock Drawing Test    Health Maintenance Due     Health Maintenance Due   Topic Date Due    COVID-19 Vaccine (1) Never done DTaP/Tdap/Td series (1 - Tdap) Never done    Shingles Vaccine (1 of 2) Never done    Flu Vaccine (1) Never done       Patient Care Team   Patient Care Team:  Edgardo Islas MD as PCP - General (Internal Medicine Physician)  Edgardo Islas MD as PCP - [facility]    History     Patient Active Problem List   Diagnosis Code    Hypercholesteremia E78.00    Hypertension I10    CAD (coronary artery disease) I25.10    Tobacco use disorder F17.200    Alcohol abuse F10.10    History of alcoholism ([facility] [address] F10.21    Hypertrophy (benign) of prostate N40.0    Mixed hyperlipidemia E78.2    Advance directive discussed with patient Z71.89    Recent unintentional weight loss over several months R63.4     Past Medical History:   Diagnosis Date    CAD (coronary artery disease)     Cardiac catheterization 03/23/2001    RCA patent. m/dRPDA 55%. LM patent. LAD patent. CX patent. oOMB 75%. LVEDP 8-10 mmHg. EF 50%. Global hypk. Patent bilateral renal arteries. Cardiac echocardiogram 10/24/2012    EF 55-60%. No WMA. Mild conc LVH. Gr 1 DDfx. RVSP 20-25 mmHg. Mild LAE. Mild ADAM. Possible sm pericardial effusion but likely fat pad. Cardiac nuclear imaging test, low risk 10/24/2012    No ischemia or prior infarction. EF 58%. Normal EKG on max EST. Ex time 6 min. Low risk.     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 90 Tablet 1    spironolactone (ALDACTONE) 25 mg tablet Take 1 Tablet by mouth two (2) times a day. 180 Tablet 1    metoprolol succinate (TOPROL-XL) 200 mg XL tablet Take 1 Tablet by mouth daily. 90 Tablet 1    isosorbide mononitrate ER (IMDUR) 30 mg tablet Take 1 Tablet by mouth daily. 90 Tablet 1    atorvastatin (LIPITOR) 40 mg tablet Take 1 Tablet by mouth daily. 90 Tablet 1    Blood Pressure Test Kit-Medium kit 1 Kit by Does Not Apply route two (2) times a day. 1 Kit 0    omega-3 fatty acids-vitamin e 1,000 mg cap Take 1 capsule by mouth as needed.        Allergies   Allergen Reactions    Lisinopril Anaphylaxis and Swelling     Swelling of tongue,  angioedema       Family History   Problem Relation Age of Onset    Heart Disease Mother     Heart Surgery Mother     Dementia Father      Social History     Tobacco Use    Smoking status: Every Day     Types: Cigarettes    Smokeless tobacco: Never    Tobacco comments:     Smokes 2 packs per week   Substance Use Topics    Alcohol use: Not Currently     Comment: 12/2022-quit 2 yrs ago         SHAKILA Bolivar

## 2023-04-25 ENCOUNTER — OFFICE VISIT (OUTPATIENT)
Age: 73
End: 2023-04-25
Payer: MEDICARE

## 2023-04-25 VITALS
HEIGHT: 71 IN | SYSTOLIC BLOOD PRESSURE: 136 MMHG | WEIGHT: 173 LBS | HEART RATE: 72 BPM | OXYGEN SATURATION: 100 % | BODY MASS INDEX: 24.22 KG/M2 | DIASTOLIC BLOOD PRESSURE: 82 MMHG

## 2023-04-25 DIAGNOSIS — I25.119 ATHEROSCLEROSIS OF NATIVE CORONARY ARTERY WITH ANGINA PECTORIS, UNSPECIFIED WHETHER NATIVE OR TRANSPLANTED HEART (HCC): Primary | ICD-10-CM

## 2023-04-25 DIAGNOSIS — R07.9 CHEST PAIN, UNSPECIFIED TYPE: ICD-10-CM

## 2023-04-25 DIAGNOSIS — R06.02 SOB (SHORTNESS OF BREATH): ICD-10-CM

## 2023-04-25 PROCEDURE — G8427 DOCREV CUR MEDS BY ELIG CLIN: HCPCS | Performed by: INTERNAL MEDICINE

## 2023-04-25 PROCEDURE — 99214 OFFICE O/P EST MOD 30 MIN: CPT | Performed by: INTERNAL MEDICINE

## 2023-04-25 PROCEDURE — 3075F SYST BP GE 130 - 139MM HG: CPT | Performed by: INTERNAL MEDICINE

## 2023-04-25 PROCEDURE — 1123F ACP DISCUSS/DSCN MKR DOCD: CPT | Performed by: INTERNAL MEDICINE

## 2023-04-25 PROCEDURE — 3017F COLORECTAL CA SCREEN DOC REV: CPT | Performed by: INTERNAL MEDICINE

## 2023-04-25 PROCEDURE — 4004F PT TOBACCO SCREEN RCVD TLK: CPT | Performed by: INTERNAL MEDICINE

## 2023-04-25 PROCEDURE — 93000 ELECTROCARDIOGRAM COMPLETE: CPT | Performed by: INTERNAL MEDICINE

## 2023-04-25 PROCEDURE — G8420 CALC BMI NORM PARAMETERS: HCPCS | Performed by: INTERNAL MEDICINE

## 2023-04-25 PROCEDURE — 3078F DIAST BP <80 MM HG: CPT | Performed by: INTERNAL MEDICINE

## 2023-04-25 ASSESSMENT — PATIENT HEALTH QUESTIONNAIRE - PHQ9
SUM OF ALL RESPONSES TO PHQ QUESTIONS 1-9: 0
SUM OF ALL RESPONSES TO PHQ9 QUESTIONS 1 & 2: 0
SUM OF ALL RESPONSES TO PHQ QUESTIONS 1-9: 0
SUM OF ALL RESPONSES TO PHQ QUESTIONS 1-9: 0
1. LITTLE INTEREST OR PLEASURE IN DOING THINGS: 0
SUM OF ALL RESPONSES TO PHQ QUESTIONS 1-9: 0
2. FEELING DOWN, DEPRESSED OR HOPELESS: 0

## 2023-04-25 NOTE — PROGRESS NOTES
HISTORY OF PRESENT ILLNESS  Santana Janis  68 y.o. male     Chief Complaint   Patient presents with    Follow-up     1 year        ASSESSMENT and PLAN    The encounter diagnosis was Atherosclerosis of native coronary artery with angina pectoris, unspecified whether native or transplanted heart ([address] Mr. Oumar Kay has history of small vessel disease in his coronary circulation from 2001 heart catheterization as well as mild disease involving his RCA and OM branch. He has not had chest pains. He has done well on medical therapy. Unfortunately, he has long history of cigarettes about one pack per day. He was a heavy drinker in the past; however, he has gradually decreased his alcohol intake. In 2020, he was able to discontinue alcohol use completely. Because of erectile dysfunction, he had taken Cialis in the past. He was able to come off long-acting nitrates by cutting back on his alcohol intake. CAD:    He has known history of small vessel disease. He remains clinically and symptomatically stable. BP:    Mildly elevated at 150/70. Rhythm:    Stable sinus rhythm 72 bpm.  CHF:    There is no evidence of decompensated CHF noted. Weight:     His weight today is 173 pounds. His current weight is at baseline. Cholesterol:   Target LDL <70. Lipitor 40 mg daily. Tobacco:   Still smokes about half a pack daily. Anti-platelet:   Remains on ASA. I encouraged tobacco cessation. He would like to try Nicorette gum. Prescription was given today. With his known history of mild CAD noted back in 2001, I have recommended proceeding on with repeat nuclear scan prior to his next visit. Obviously, if he develops symptoms, this can be done more quickly. I will see him back annually. Thank you. Orders Placed This Encounter   Procedures    EKG 12 Lead     Order Specific Question:   Reason for Exam?     Answer: Other        HPI  Today, Mr. Johanne Cabral has no complaints of chest pains or increased BRO.   He

## 2023-04-25 NOTE — PROGRESS NOTES
Dandy Cesar presents today for   Chief Complaint   Patient presents with    Follow-up     1 year        [place] Tali preferred language for health care discussion is english/other. Is someone accompanying this pt? no    Is the patient using any DME equipment during OV? no    Depression Screening:  Depression: Not at risk    PHQ-2 Score: 0        Learning Assessment:  Who is the primary learner? Patient    What is the preferred language for health care of the primary learner? ENGLISH    How does the primary learner prefer to learn new concepts? DEMONSTRATION    Answered By patient    Relationship to Learner SELF           Pt currently taking Anticoagulant therapy? no    Pt currently taking Antiplatelet therapy ?  MG 1X DAILY       Coordination of Care:  1. Have you been to the ER, urgent care clinic since your last visit? Hospitalized since your last visit? no    2. Have you seen or consulted any other health care providers outside of the [address] [place] [address] since your last visit? Include any pap smears or colon screening.  no

## 2023-04-28 ENCOUNTER — OFFICE VISIT (OUTPATIENT)
Facility: CLINIC | Age: 73
End: 2023-04-28
Payer: MEDICARE

## 2023-04-28 VITALS
SYSTOLIC BLOOD PRESSURE: 132 MMHG | HEART RATE: 77 BPM | DIASTOLIC BLOOD PRESSURE: 66 MMHG | BODY MASS INDEX: 24.22 KG/M2 | HEIGHT: 71 IN | TEMPERATURE: 98.1 F | WEIGHT: 173 LBS | RESPIRATION RATE: 18 BRPM | OXYGEN SATURATION: 99 %

## 2023-04-28 DIAGNOSIS — I25.10 ATHEROSCLEROSIS OF NATIVE CORONARY ARTERY OF NATIVE HEART WITHOUT ANGINA PECTORIS: ICD-10-CM

## 2023-04-28 DIAGNOSIS — Z72.0 TOBACCO USE: ICD-10-CM

## 2023-04-28 DIAGNOSIS — I10 ESSENTIAL (PRIMARY) HYPERTENSION: Primary | ICD-10-CM

## 2023-04-28 DIAGNOSIS — E55.9 VITAMIN D DEFICIENCY, UNSPECIFIED: ICD-10-CM

## 2023-04-28 DIAGNOSIS — E78.2 MIXED HYPERLIPIDEMIA: ICD-10-CM

## 2023-04-28 DIAGNOSIS — Z12.5 SCREENING FOR MALIGNANT NEOPLASM OF PROSTATE: ICD-10-CM

## 2023-04-28 DIAGNOSIS — F10.21 ALCOHOL DEPENDENCE, IN REMISSION (HCC): ICD-10-CM

## 2023-04-28 PROCEDURE — 3075F SYST BP GE 130 - 139MM HG: CPT | Performed by: STUDENT IN AN ORGANIZED HEALTH CARE EDUCATION/TRAINING PROGRAM

## 2023-04-28 PROCEDURE — 3017F COLORECTAL CA SCREEN DOC REV: CPT | Performed by: STUDENT IN AN ORGANIZED HEALTH CARE EDUCATION/TRAINING PROGRAM

## 2023-04-28 PROCEDURE — 3078F DIAST BP <80 MM HG: CPT | Performed by: STUDENT IN AN ORGANIZED HEALTH CARE EDUCATION/TRAINING PROGRAM

## 2023-04-28 PROCEDURE — 4004F PT TOBACCO SCREEN RCVD TLK: CPT | Performed by: STUDENT IN AN ORGANIZED HEALTH CARE EDUCATION/TRAINING PROGRAM

## 2023-04-28 PROCEDURE — G8420 CALC BMI NORM PARAMETERS: HCPCS | Performed by: STUDENT IN AN ORGANIZED HEALTH CARE EDUCATION/TRAINING PROGRAM

## 2023-04-28 PROCEDURE — G8427 DOCREV CUR MEDS BY ELIG CLIN: HCPCS | Performed by: STUDENT IN AN ORGANIZED HEALTH CARE EDUCATION/TRAINING PROGRAM

## 2023-04-28 PROCEDURE — 1123F ACP DISCUSS/DSCN MKR DOCD: CPT | Performed by: STUDENT IN AN ORGANIZED HEALTH CARE EDUCATION/TRAINING PROGRAM

## 2023-04-28 PROCEDURE — 99214 OFFICE O/P EST MOD 30 MIN: CPT | Performed by: STUDENT IN AN ORGANIZED HEALTH CARE EDUCATION/TRAINING PROGRAM

## 2023-04-28 SDOH — ECONOMIC STABILITY: INCOME INSECURITY: HOW HARD IS IT FOR YOU TO PAY FOR THE VERY BASICS LIKE FOOD, HOUSING, MEDICAL CARE, AND HEATING?: SOMEWHAT HARD

## 2023-04-28 SDOH — ECONOMIC STABILITY: FOOD INSECURITY: WITHIN THE PAST 12 MONTHS, THE FOOD YOU BOUGHT JUST DIDN'T LAST AND YOU DIDN'T HAVE MONEY TO GET MORE.: SOMETIMES TRUE

## 2023-04-28 SDOH — ECONOMIC STABILITY: FOOD INSECURITY: WITHIN THE PAST 12 MONTHS, YOU WORRIED THAT YOUR FOOD WOULD RUN OUT BEFORE YOU GOT MONEY TO BUY MORE.: SOMETIMES TRUE

## 2023-04-28 SDOH — ECONOMIC STABILITY: HOUSING INSECURITY
IN THE LAST 12 MONTHS, WAS THERE A TIME WHEN YOU DID NOT HAVE A STEADY PLACE TO SLEEP OR SLEPT IN A SHELTER (INCLUDING NOW)?: YES

## 2023-04-28 ASSESSMENT — ENCOUNTER SYMPTOMS
ABDOMINAL PAIN: 0
NAUSEA: 0
VOMITING: 0
RHINORRHEA: 0
DIARRHEA: 0
CONSTIPATION: 0
CHEST TIGHTNESS: 0
BACK PAIN: 0
SHORTNESS OF BREATH: 1

## 2023-04-28 ASSESSMENT — PATIENT HEALTH QUESTIONNAIRE - PHQ9
SUM OF ALL RESPONSES TO PHQ QUESTIONS 1-9: 0
1. LITTLE INTEREST OR PLEASURE IN DOING THINGS: 0
SUM OF ALL RESPONSES TO PHQ QUESTIONS 1-9: 0
SUM OF ALL RESPONSES TO PHQ QUESTIONS 1-9: 0
SUM OF ALL RESPONSES TO PHQ9 QUESTIONS 1 & 2: 0
SUM OF ALL RESPONSES TO PHQ QUESTIONS 1-9: 0
2. FEELING DOWN, DEPRESSED OR HOPELESS: 0

## 2023-04-28 NOTE — PROGRESS NOTES
Ed Zayas presents today for   Chief Complaint   Patient presents with    Blood Pressure Check    Advance Care Planning     /66 (Site: Right Upper Arm, Position: Sitting, Cuff Size: Medium Adult)   Pulse 77   Temp 98.1 °F (36.7 °C) (Oral)   Resp 18   Ht 5' 11\" (1.803 m)   Wt 173 lb (78.5 kg)   SpO2 99%   BMI 24.13 kg/m²      1. \"Have you been to the ER, urgent care clinic since your last visit? Hospitalized since your last visit? \" No    2. \"Have you seen or consulted any other health care providers outside of the [address] [place] [address] since your last visit? \" No     3. For patients aged 39-70: Has the patient had a colonoscopy / FIT/ Cologuard?  Yes - no Care Gap present

## 2023-04-28 NOTE — PROGRESS NOTES
Paradise Castillo is a 68 y.o. male presenting today for Blood Pressure Check and Advance Care Planning  . Chief Complaint   Patient presents with    Blood Pressure Check    Advance Care Planning       HPI:  Paradise Castillo presents to the office today for follow up. Patient has a PMHx of HTN, HLD, CAD, tobacco abuse, history of alcohol abuse. CAD: Patient follows with cardiology: Dr. Elder Brantley. He has been scheduled for nuclear scan. HTN: Takes amlodipine, Imdur, metoprolol and Aldactone. HLD: Lipid panel in 2/22 showed .8, HDL 37, triglyceride 96. He is lipitor 40 mg daily. He underwent CT lung cancer screen in 4/22 which was negative but showed severe coronary arteriosclerosis. Also noted to have mild bronchitis and emphysema. Wt loss: Last visit with his PCP in 4/22, patient was complaining of unintentional weight loss. CT lung cancer screening in 4/22 was negative but showed severe coronary arteriosclerosis. He was also noted to have mild bronchitis and mild emphysema. Patient states his average wt was 190s. His wt has slowly been decreasing. Colonoscopy in 12/22 showed polyps in descending colon and sigmoid colon with severe diverticulosis of sigmoid and descending colon. Repeat colonoscopy was recommended in 5 years. Weight today is stable at 173 pounds compared to last visits. Smoker: Patient is a heavy smoker. He smokes 1.5 ppd since 4 years. He started smoking at 13 yrs of age around 0.5-1 ppd. He has not tried to quit. He was prescribed nicotine gum by his cardiologist.     History of alcohol abuse: Patient abused alcohol for many years. He quit alcohol 2 years ago. Review of Systems   Constitutional:  Negative for activity change, appetite change, fatigue and fever. HENT:  Negative for congestion, ear pain, postnasal drip and rhinorrhea. Respiratory:  Positive for shortness of breath. Negative for chest tightness.     Cardiovascular:  Negative for chest

## 2023-05-24 ENCOUNTER — TELEPHONE (OUTPATIENT)
Age: 73
End: 2023-05-24

## 2023-05-24 NOTE — TELEPHONE ENCOUNTER
----- Message from [id number] Vinicio Martinez MD sent at 5/23/2023  4:49 PM EDT -----  Let the patient know that his nuclear scan shows no significant unexpected findings. We will continue medical therapy. If he has worsening symptoms, he needs to let us know.  ----- Message -----  From: Celia Alvarado RN  Sent: 5/10/2023   1:09 PM EDT  To: [id number] Vinicio Martinez MD    Per your last note:     With his known history of mild CAD noted back in 2001, I have recommended proceeding on with repeat nuclear scan prior to his next visit

## 2024-01-16 ENCOUNTER — HOSPITAL ENCOUNTER (OUTPATIENT)
Facility: HOSPITAL | Age: 74
Setting detail: SPECIMEN
Discharge: HOME | End: 2024-01-19
Payer: MEDICARE

## 2024-01-16 ENCOUNTER — OFFICE VISIT (OUTPATIENT)
Facility: CLINIC | Age: 74
End: 2024-01-16
Payer: MEDICARE

## 2024-01-16 ENCOUNTER — HOSPITAL ENCOUNTER (INPATIENT)
Facility: HOSPITAL | Age: 74
LOS: 3 days | Discharge: HOME | DRG: 378 | End: 2024-01-20
Attending: EMERGENCY MEDICINE | Admitting: HOSPITALIST
Payer: MEDICARE

## 2024-01-16 VITALS
TEMPERATURE: 98.9 F | WEIGHT: 168 LBS | RESPIRATION RATE: 16 BRPM | HEART RATE: 70 BPM | DIASTOLIC BLOOD PRESSURE: 59 MMHG | OXYGEN SATURATION: 100 % | SYSTOLIC BLOOD PRESSURE: 107 MMHG | BODY MASS INDEX: 23.52 KG/M2 | HEIGHT: 71 IN

## 2024-01-16 DIAGNOSIS — E78.2 MIXED HYPERLIPIDEMIA: ICD-10-CM

## 2024-01-16 DIAGNOSIS — D64.9 ANEMIA, UNSPECIFIED TYPE: Primary | ICD-10-CM

## 2024-01-16 DIAGNOSIS — Z12.5 SCREENING FOR MALIGNANT NEOPLASM OF PROSTATE: ICD-10-CM

## 2024-01-16 DIAGNOSIS — Z87.891 PERSONAL HISTORY OF TOBACCO USE: ICD-10-CM

## 2024-01-16 DIAGNOSIS — E87.6 HYPOKALEMIA: ICD-10-CM

## 2024-01-16 DIAGNOSIS — F10.21 ALCOHOL DEPENDENCE, IN REMISSION (HCC): ICD-10-CM

## 2024-01-16 DIAGNOSIS — I10 ESSENTIAL (PRIMARY) HYPERTENSION: ICD-10-CM

## 2024-01-16 DIAGNOSIS — K92.2 UPPER GI BLEED: ICD-10-CM

## 2024-01-16 DIAGNOSIS — I25.119 ATHEROSCLEROSIS OF NATIVE CORONARY ARTERY OF NATIVE HEART WITH ANGINA PECTORIS (HCC): ICD-10-CM

## 2024-01-16 DIAGNOSIS — Z01.818 PRE-OPERATIVE CLEARANCE: Primary | ICD-10-CM

## 2024-01-16 DIAGNOSIS — E55.9 VITAMIN D DEFICIENCY, UNSPECIFIED: ICD-10-CM

## 2024-01-16 LAB
25(OH)D3 SERPL-MCNC: 16 NG/ML (ref 30–100)
ALBUMIN SERPL-MCNC: 3.5 G/DL (ref 3.4–5)
ALBUMIN SERPL-MCNC: 3.8 G/DL (ref 3.4–5)
ALBUMIN/GLOB SERPL: 0.9 (ref 0.8–1.7)
ALBUMIN/GLOB SERPL: 1.1 (ref 0.8–1.7)
ALP SERPL-CCNC: 109 U/L (ref 45–117)
ALP SERPL-CCNC: 97 U/L (ref 45–117)
ALT SERPL-CCNC: 14 U/L (ref 16–61)
ALT SERPL-CCNC: 15 U/L (ref 16–61)
ANION GAP SERPL CALC-SCNC: 5 MMOL/L (ref 3–18)
ANION GAP SERPL CALC-SCNC: 6 MMOL/L (ref 3–18)
APTT PPP: 28.6 SEC (ref 23–36.4)
AST SERPL-CCNC: 29 U/L (ref 10–38)
AST SERPL-CCNC: 29 U/L (ref 10–38)
BASOPHILS # BLD: 0.1 K/UL (ref 0–0.1)
BASOPHILS # BLD: 0.1 K/UL (ref 0–0.1)
BASOPHILS NFR BLD: 1 % (ref 0–2)
BASOPHILS NFR BLD: 1 % (ref 0–2)
BILIRUB DIRECT SERPL-MCNC: 0.1 MG/DL (ref 0–0.2)
BILIRUB SERPL-MCNC: 0.5 MG/DL (ref 0.2–1)
BILIRUB SERPL-MCNC: 0.6 MG/DL (ref 0.2–1)
BUN SERPL-MCNC: 12 MG/DL (ref 7–18)
BUN SERPL-MCNC: 9 MG/DL (ref 7–18)
BUN/CREAT SERPL: 12 (ref 12–20)
BUN/CREAT SERPL: 14 (ref 12–20)
CALCIUM SERPL-MCNC: 8.7 MG/DL (ref 8.5–10.1)
CALCIUM SERPL-MCNC: 9 MG/DL (ref 8.5–10.1)
CHLORIDE SERPL-SCNC: 107 MMOL/L (ref 100–111)
CHLORIDE SERPL-SCNC: 109 MMOL/L (ref 100–111)
CHOLEST SERPL-MCNC: 138 MG/DL
CO2 SERPL-SCNC: 25 MMOL/L (ref 21–32)
CO2 SERPL-SCNC: 26 MMOL/L (ref 21–32)
CREAT SERPL-MCNC: 0.76 MG/DL (ref 0.6–1.3)
CREAT SERPL-MCNC: 0.84 MG/DL (ref 0.6–1.3)
DIFFERENTIAL METHOD BLD: ABNORMAL
DIFFERENTIAL METHOD BLD: ABNORMAL
EOSINOPHIL # BLD: 0.1 K/UL (ref 0–0.4)
EOSINOPHIL # BLD: 0.1 K/UL (ref 0–0.4)
EOSINOPHIL NFR BLD: 1 % (ref 0–5)
EOSINOPHIL NFR BLD: 1 % (ref 0–5)
ERYTHROCYTE [DISTWIDTH] IN BLOOD BY AUTOMATED COUNT: 17.4 % (ref 11.6–14.5)
ERYTHROCYTE [DISTWIDTH] IN BLOOD BY AUTOMATED COUNT: 17.5 % (ref 11.6–14.5)
GLOBULIN SER CALC-MCNC: 3.3 G/DL (ref 2–4)
GLOBULIN SER CALC-MCNC: 4.3 G/DL (ref 2–4)
GLUCOSE SERPL-MCNC: 109 MG/DL (ref 74–99)
GLUCOSE SERPL-MCNC: 93 MG/DL (ref 74–99)
HCT VFR BLD AUTO: 19.7 % (ref 36–48)
HCT VFR BLD AUTO: 21.1 % (ref 36–48)
HDLC SERPL-MCNC: 41 MG/DL (ref 40–60)
HDLC SERPL: 3.4 (ref 0–5)
HEMOCCULT STL QL: POSITIVE
HGB BLD-MCNC: 5.4 G/DL (ref 13–16)
HGB BLD-MCNC: 5.8 G/DL (ref 13–16)
HISTORY CHECK: NORMAL
IMM GRANULOCYTES # BLD AUTO: 0 K/UL (ref 0–0.04)
IMM GRANULOCYTES # BLD AUTO: 0 K/UL (ref 0–0.04)
IMM GRANULOCYTES NFR BLD AUTO: 0 % (ref 0–0.5)
IMM GRANULOCYTES NFR BLD AUTO: 0 % (ref 0–0.5)
INR PPP: 1.1 (ref 0.9–1.1)
LDLC SERPL CALC-MCNC: 89 MG/DL (ref 0–100)
LIPASE SERPL-CCNC: 20 U/L (ref 13–75)
LIPID PANEL: NORMAL
LYMPHOCYTES # BLD: 0.9 K/UL (ref 0.9–3.6)
LYMPHOCYTES # BLD: 1.3 K/UL (ref 0.9–3.6)
LYMPHOCYTES NFR BLD: 16 % (ref 21–52)
LYMPHOCYTES NFR BLD: 16 % (ref 21–52)
MCH RBC QN AUTO: 19.6 PG (ref 24–34)
MCH RBC QN AUTO: 20.1 PG (ref 24–34)
MCHC RBC AUTO-ENTMCNC: 27.4 G/DL (ref 31–37)
MCHC RBC AUTO-ENTMCNC: 27.5 G/DL (ref 31–37)
MCV RBC AUTO: 71.3 FL (ref 78–100)
MCV RBC AUTO: 73.2 FL (ref 78–100)
MONOCYTES # BLD: 0.6 K/UL (ref 0.05–1.2)
MONOCYTES # BLD: 0.8 K/UL (ref 0.05–1.2)
MONOCYTES NFR BLD: 10 % (ref 3–10)
MONOCYTES NFR BLD: 11 % (ref 3–10)
NEUTS SEG # BLD: 4 K/UL (ref 1.8–8)
NEUTS SEG # BLD: 5.7 K/UL (ref 1.8–8)
NEUTS SEG NFR BLD: 71 % (ref 40–73)
NEUTS SEG NFR BLD: 72 % (ref 40–73)
NRBC # BLD: 0 K/UL (ref 0–0.01)
NRBC # BLD: 0 K/UL (ref 0–0.01)
NRBC BLD-RTO: 0 PER 100 WBC
NRBC BLD-RTO: 0 PER 100 WBC
PLATELET # BLD AUTO: 218 K/UL (ref 135–420)
PLATELET # BLD AUTO: 237 K/UL (ref 135–420)
PLATELET COMMENT: ABNORMAL
POTASSIUM SERPL-SCNC: 2.9 MMOL/L (ref 3.5–5.5)
POTASSIUM SERPL-SCNC: 3.1 MMOL/L (ref 3.5–5.5)
PROT SERPL-MCNC: 6.8 G/DL (ref 6.4–8.2)
PROT SERPL-MCNC: 8.1 G/DL (ref 6.4–8.2)
PROTHROMBIN TIME: 14.2 SEC (ref 11.9–14.7)
PSA SERPL-MCNC: 0.5 NG/ML (ref 0–4)
RBC # BLD AUTO: 2.69 M/UL (ref 4.35–5.65)
RBC # BLD AUTO: 2.96 M/UL (ref 4.35–5.65)
RBC MORPH BLD: ABNORMAL
SODIUM SERPL-SCNC: 138 MMOL/L (ref 136–145)
SODIUM SERPL-SCNC: 140 MMOL/L (ref 136–145)
TRIGL SERPL-MCNC: 40 MG/DL
VLDLC SERPL CALC-MCNC: 8 MG/DL
WBC # BLD AUTO: 5.7 K/UL (ref 4.6–13.2)
WBC # BLD AUTO: 8 K/UL (ref 4.6–13.2)

## 2024-01-16 PROCEDURE — 99214 OFFICE O/P EST MOD 30 MIN: CPT | Performed by: STUDENT IN AN ORGANIZED HEALTH CARE EDUCATION/TRAINING PROGRAM

## 2024-01-16 PROCEDURE — G0296 VISIT TO DETERM LDCT ELIG: HCPCS | Performed by: STUDENT IN AN ORGANIZED HEALTH CARE EDUCATION/TRAINING PROGRAM

## 2024-01-16 PROCEDURE — 86900 BLOOD TYPING SEROLOGIC ABO: CPT

## 2024-01-16 PROCEDURE — 1123F ACP DISCUSS/DSCN MKR DOCD: CPT | Performed by: STUDENT IN AN ORGANIZED HEALTH CARE EDUCATION/TRAINING PROGRAM

## 2024-01-16 PROCEDURE — 80076 HEPATIC FUNCTION PANEL: CPT

## 2024-01-16 PROCEDURE — 83690 ASSAY OF LIPASE: CPT

## 2024-01-16 PROCEDURE — 99285 EMERGENCY DEPT VISIT HI MDM: CPT

## 2024-01-16 PROCEDURE — 85025 COMPLETE CBC W/AUTO DIFF WBC: CPT

## 2024-01-16 PROCEDURE — 86901 BLOOD TYPING SEROLOGIC RH(D): CPT

## 2024-01-16 PROCEDURE — 80053 COMPREHEN METABOLIC PANEL: CPT

## 2024-01-16 PROCEDURE — 85730 THROMBOPLASTIN TIME PARTIAL: CPT

## 2024-01-16 PROCEDURE — 80061 LIPID PANEL: CPT

## 2024-01-16 PROCEDURE — G8427 DOCREV CUR MEDS BY ELIG CLIN: HCPCS | Performed by: STUDENT IN AN ORGANIZED HEALTH CARE EDUCATION/TRAINING PROGRAM

## 2024-01-16 PROCEDURE — 3074F SYST BP LT 130 MM HG: CPT | Performed by: STUDENT IN AN ORGANIZED HEALTH CARE EDUCATION/TRAINING PROGRAM

## 2024-01-16 PROCEDURE — 82272 OCCULT BLD FECES 1-3 TESTS: CPT

## 2024-01-16 PROCEDURE — 85610 PROTHROMBIN TIME: CPT

## 2024-01-16 PROCEDURE — G8420 CALC BMI NORM PARAMETERS: HCPCS | Performed by: STUDENT IN AN ORGANIZED HEALTH CARE EDUCATION/TRAINING PROGRAM

## 2024-01-16 PROCEDURE — G8484 FLU IMMUNIZE NO ADMIN: HCPCS | Performed by: STUDENT IN AN ORGANIZED HEALTH CARE EDUCATION/TRAINING PROGRAM

## 2024-01-16 PROCEDURE — 86923 COMPATIBILITY TEST ELECTRIC: CPT

## 2024-01-16 PROCEDURE — 93005 ELECTROCARDIOGRAM TRACING: CPT

## 2024-01-16 PROCEDURE — 82306 VITAMIN D 25 HYDROXY: CPT

## 2024-01-16 PROCEDURE — 36415 COLL VENOUS BLD VENIPUNCTURE: CPT

## 2024-01-16 PROCEDURE — 3078F DIAST BP <80 MM HG: CPT | Performed by: STUDENT IN AN ORGANIZED HEALTH CARE EDUCATION/TRAINING PROGRAM

## 2024-01-16 PROCEDURE — G0103 PSA SCREENING: HCPCS

## 2024-01-16 PROCEDURE — 4004F PT TOBACCO SCREEN RCVD TLK: CPT | Performed by: STUDENT IN AN ORGANIZED HEALTH CARE EDUCATION/TRAINING PROGRAM

## 2024-01-16 PROCEDURE — 3017F COLORECTAL CA SCREEN DOC REV: CPT | Performed by: STUDENT IN AN ORGANIZED HEALTH CARE EDUCATION/TRAINING PROGRAM

## 2024-01-16 PROCEDURE — 86850 RBC ANTIBODY SCREEN: CPT

## 2024-01-16 RX ORDER — ATORVASTATIN CALCIUM 40 MG/1
40 TABLET, FILM COATED ORAL DAILY
Qty: 90 TABLET | Refills: 1 | Status: SHIPPED | OUTPATIENT
Start: 2024-01-16

## 2024-01-16 RX ORDER — POTASSIUM CHLORIDE 7.45 MG/ML
10 INJECTION INTRAVENOUS
Status: COMPLETED | OUTPATIENT
Start: 2024-01-16 | End: 2024-01-17

## 2024-01-16 RX ORDER — AMLODIPINE BESYLATE 10 MG/1
10 TABLET ORAL DAILY
Qty: 90 TABLET | Refills: 1 | Status: SHIPPED | OUTPATIENT
Start: 2024-01-16

## 2024-01-16 RX ORDER — SODIUM CHLORIDE 9 MG/ML
INJECTION, SOLUTION INTRAVENOUS PRN
Status: DISCONTINUED | OUTPATIENT
Start: 2024-01-16 | End: 2024-01-19

## 2024-01-16 SDOH — ECONOMIC STABILITY: FOOD INSECURITY: WITHIN THE PAST 12 MONTHS, THE FOOD YOU BOUGHT JUST DIDN'T LAST AND YOU DIDN'T HAVE MONEY TO GET MORE.: NEVER TRUE

## 2024-01-16 SDOH — ECONOMIC STABILITY: INCOME INSECURITY: HOW HARD IS IT FOR YOU TO PAY FOR THE VERY BASICS LIKE FOOD, HOUSING, MEDICAL CARE, AND HEATING?: NOT HARD AT ALL

## 2024-01-16 SDOH — ECONOMIC STABILITY: FOOD INSECURITY: WITHIN THE PAST 12 MONTHS, YOU WORRIED THAT YOUR FOOD WOULD RUN OUT BEFORE YOU GOT MONEY TO BUY MORE.: NEVER TRUE

## 2024-01-16 SDOH — ECONOMIC STABILITY: HOUSING INSECURITY
IN THE LAST 12 MONTHS, WAS THERE A TIME WHEN YOU DID NOT HAVE A STEADY PLACE TO SLEEP OR SLEPT IN A SHELTER (INCLUDING NOW)?: NO

## 2024-01-16 ASSESSMENT — ENCOUNTER SYMPTOMS
BACK PAIN: 0
EYE DISCHARGE: 0
NAUSEA: 0
SHORTNESS OF BREATH: 1
VOMITING: 0
ABDOMINAL PAIN: 0
NAUSEA: 0
COUGH: 0
DIARRHEA: 0
CONSTIPATION: 0
RHINORRHEA: 0
SORE THROAT: 0
RHINORRHEA: 0
ABDOMINAL PAIN: 0
SHORTNESS OF BREATH: 1
CHEST TIGHTNESS: 0
VOMITING: 0

## 2024-01-16 ASSESSMENT — PATIENT HEALTH QUESTIONNAIRE - PHQ9
SUM OF ALL RESPONSES TO PHQ9 QUESTIONS 1 & 2: 0
1. LITTLE INTEREST OR PLEASURE IN DOING THINGS: 0
SUM OF ALL RESPONSES TO PHQ QUESTIONS 1-9: 0
2. FEELING DOWN, DEPRESSED OR HOPELESS: 0
SUM OF ALL RESPONSES TO PHQ QUESTIONS 1-9: 0

## 2024-01-16 ASSESSMENT — ANXIETY QUESTIONNAIRES
3. WORRYING TOO MUCH ABOUT DIFFERENT THINGS: 3
7. FEELING AFRAID AS IF SOMETHING AWFUL MIGHT HAPPEN: 1
GAD7 TOTAL SCORE: 8
5. BEING SO RESTLESS THAT IT IS HARD TO SIT STILL: 0
4. TROUBLE RELAXING: 0
1. FEELING NERVOUS, ANXIOUS, OR ON EDGE: 1
2. NOT BEING ABLE TO STOP OR CONTROL WORRYING: 3
IF YOU CHECKED OFF ANY PROBLEMS ON THIS QUESTIONNAIRE, HOW DIFFICULT HAVE THESE PROBLEMS MADE IT FOR YOU TO DO YOUR WORK, TAKE CARE OF THINGS AT HOME, OR GET ALONG WITH OTHER PEOPLE: SOMEWHAT DIFFICULT
6. BECOMING EASILY ANNOYED OR IRRITABLE: 0

## 2024-01-16 ASSESSMENT — PAIN - FUNCTIONAL ASSESSMENT: PAIN_FUNCTIONAL_ASSESSMENT: NONE - DENIES PAIN

## 2024-01-16 NOTE — PROGRESS NOTES
tomography (LDCT) every year.  Stop screening once a person has not smoked for 15 years or has a health problem that limits life expectancy or the ability to have lung surgery.    The patient  reports that he has been smoking. He has never used smokeless tobacco.. Discussed with patient the risks and benefits of screening, including over-diagnosis, false positive rate, and total radiation exposure.  The patient currently exhibits no signs or symptoms suggestive of lung cancer.  Discussed with patient the importance of compliance with yearly annual lung cancer screenings and willingness to undergo diagnosis and treatment if screening scan is positive.  In addition, the patient was counseled regarding the importance of remaining smoke free and/or total smoking cessation.    Also reviewed the following if the patient has Medicare that as of February 10, 2022, Medicare only covers LDCT screening in patients aged 50-77 with at least a 20 pack-year smoking history who currently smoke or have quit in the last 15 years

## 2024-01-17 ENCOUNTER — TELEPHONE (OUTPATIENT)
Facility: CLINIC | Age: 74
End: 2024-01-17

## 2024-01-17 ENCOUNTER — ANESTHESIA EVENT (OUTPATIENT)
Facility: HOSPITAL | Age: 74
End: 2024-01-17
Payer: MEDICARE

## 2024-01-17 PROBLEM — D64.9 ANEMIA: Status: ACTIVE | Noted: 2024-01-17

## 2024-01-17 PROBLEM — K92.2 GASTROINTESTINAL HEMORRHAGE: Status: ACTIVE | Noted: 2024-01-17

## 2024-01-17 LAB
EKG ATRIAL RATE: 73 BPM
EKG ATRIAL RATE: 76 BPM
EKG DIAGNOSIS: NORMAL
EKG DIAGNOSIS: NORMAL
EKG P AXIS: 51 DEGREES
EKG P AXIS: 63 DEGREES
EKG P-R INTERVAL: 200 MS
EKG P-R INTERVAL: 214 MS
EKG Q-T INTERVAL: 420 MS
EKG Q-T INTERVAL: 420 MS
EKG QRS DURATION: 128 MS
EKG QRS DURATION: 134 MS
EKG QTC CALCULATION (BAZETT): 462 MS
EKG QTC CALCULATION (BAZETT): 472 MS
EKG R AXIS: -33 DEGREES
EKG R AXIS: -37 DEGREES
EKG T AXIS: 125 DEGREES
EKG T AXIS: 126 DEGREES
EKG VENTRICULAR RATE: 73 BPM
EKG VENTRICULAR RATE: 76 BPM
HCT VFR BLD AUTO: 25.1 % (ref 36–48)
HGB BLD-MCNC: 7.4 G/DL (ref 13–16)
HISTORY CHECK: NORMAL
POTASSIUM SERPL-SCNC: 3.5 MMOL/L (ref 3.5–5.5)

## 2024-01-17 PROCEDURE — A4216 STERILE WATER/SALINE, 10 ML: HCPCS

## 2024-01-17 PROCEDURE — 93010 ELECTROCARDIOGRAM REPORT: CPT | Performed by: INTERNAL MEDICINE

## 2024-01-17 PROCEDURE — 6360000002 HC RX W HCPCS

## 2024-01-17 PROCEDURE — 96375 TX/PRO/DX INJ NEW DRUG ADDON: CPT

## 2024-01-17 PROCEDURE — 36415 COLL VENOUS BLD VENIPUNCTURE: CPT

## 2024-01-17 PROCEDURE — 6370000000 HC RX 637 (ALT 250 FOR IP): Performed by: STUDENT IN AN ORGANIZED HEALTH CARE EDUCATION/TRAINING PROGRAM

## 2024-01-17 PROCEDURE — 2580000003 HC RX 258: Performed by: HOSPITALIST

## 2024-01-17 PROCEDURE — 85018 HEMOGLOBIN: CPT

## 2024-01-17 PROCEDURE — C9113 INJ PANTOPRAZOLE SODIUM, VIA: HCPCS

## 2024-01-17 PROCEDURE — P9016 RBC LEUKOCYTES REDUCED: HCPCS

## 2024-01-17 PROCEDURE — 6360000002 HC RX W HCPCS: Performed by: HOSPITALIST

## 2024-01-17 PROCEDURE — 96365 THER/PROPH/DIAG IV INF INIT: CPT

## 2024-01-17 PROCEDURE — 6370000000 HC RX 637 (ALT 250 FOR IP): Performed by: HOSPITALIST

## 2024-01-17 PROCEDURE — 96374 THER/PROPH/DIAG INJ IV PUSH: CPT

## 2024-01-17 PROCEDURE — 1100000003 HC PRIVATE W/ TELEMETRY

## 2024-01-17 PROCEDURE — 84132 ASSAY OF SERUM POTASSIUM: CPT

## 2024-01-17 PROCEDURE — 2580000003 HC RX 258

## 2024-01-17 PROCEDURE — 99223 1ST HOSP IP/OBS HIGH 75: CPT | Performed by: HOSPITALIST

## 2024-01-17 PROCEDURE — 85014 HEMATOCRIT: CPT

## 2024-01-17 PROCEDURE — 93005 ELECTROCARDIOGRAM TRACING: CPT | Performed by: STUDENT IN AN ORGANIZED HEALTH CARE EDUCATION/TRAINING PROGRAM

## 2024-01-17 RX ORDER — AMLODIPINE BESYLATE 10 MG/1
10 TABLET ORAL DAILY
Status: DISCONTINUED | OUTPATIENT
Start: 2024-01-17 | End: 2024-01-20 | Stop reason: HOSPADM

## 2024-01-17 RX ORDER — ONDANSETRON 2 MG/ML
4 INJECTION INTRAMUSCULAR; INTRAVENOUS EVERY 6 HOURS PRN
Status: DISCONTINUED | OUTPATIENT
Start: 2024-01-17 | End: 2024-01-20 | Stop reason: HOSPADM

## 2024-01-17 RX ORDER — ACETAMINOPHEN 650 MG/1
650 SUPPOSITORY RECTAL EVERY 6 HOURS PRN
Status: DISCONTINUED | OUTPATIENT
Start: 2024-01-17 | End: 2024-01-20 | Stop reason: HOSPADM

## 2024-01-17 RX ORDER — POTASSIUM CHLORIDE 20 MEQ/1
40 TABLET, EXTENDED RELEASE ORAL PRN
Status: DISCONTINUED | OUTPATIENT
Start: 2024-01-17 | End: 2024-01-20 | Stop reason: HOSPADM

## 2024-01-17 RX ORDER — ISOSORBIDE MONONITRATE 30 MG/1
30 TABLET, EXTENDED RELEASE ORAL DAILY
Status: DISCONTINUED | OUTPATIENT
Start: 2024-01-17 | End: 2024-01-20 | Stop reason: HOSPADM

## 2024-01-17 RX ORDER — MAGNESIUM SULFATE IN WATER 40 MG/ML
2000 INJECTION, SOLUTION INTRAVENOUS PRN
Status: DISCONTINUED | OUTPATIENT
Start: 2024-01-17 | End: 2024-01-20 | Stop reason: HOSPADM

## 2024-01-17 RX ORDER — ONDANSETRON 4 MG/1
4 TABLET, ORALLY DISINTEGRATING ORAL EVERY 8 HOURS PRN
Status: DISCONTINUED | OUTPATIENT
Start: 2024-01-17 | End: 2024-01-20 | Stop reason: HOSPADM

## 2024-01-17 RX ORDER — SODIUM CHLORIDE 9 MG/ML
INJECTION, SOLUTION INTRAVENOUS PRN
Status: DISCONTINUED | OUTPATIENT
Start: 2024-01-17 | End: 2024-01-19

## 2024-01-17 RX ORDER — POLYETHYLENE GLYCOL 3350 17 G/17G
17 POWDER, FOR SOLUTION ORAL DAILY PRN
Status: DISCONTINUED | OUTPATIENT
Start: 2024-01-17 | End: 2024-01-20 | Stop reason: HOSPADM

## 2024-01-17 RX ORDER — ACETAMINOPHEN 325 MG/1
650 TABLET ORAL EVERY 6 HOURS PRN
Status: DISCONTINUED | OUTPATIENT
Start: 2024-01-17 | End: 2024-01-20 | Stop reason: HOSPADM

## 2024-01-17 RX ORDER — SODIUM CHLORIDE 0.9 % (FLUSH) 0.9 %
5-40 SYRINGE (ML) INJECTION EVERY 12 HOURS SCHEDULED
Status: DISCONTINUED | OUTPATIENT
Start: 2024-01-17 | End: 2024-01-20 | Stop reason: HOSPADM

## 2024-01-17 RX ORDER — SODIUM CHLORIDE 0.9 % (FLUSH) 0.9 %
5-40 SYRINGE (ML) INJECTION PRN
Status: DISCONTINUED | OUTPATIENT
Start: 2024-01-17 | End: 2024-01-20 | Stop reason: HOSPADM

## 2024-01-17 RX ORDER — ATORVASTATIN CALCIUM 40 MG/1
40 TABLET, FILM COATED ORAL DAILY
Status: DISCONTINUED | OUTPATIENT
Start: 2024-01-17 | End: 2024-01-20 | Stop reason: HOSPADM

## 2024-01-17 RX ORDER — METOPROLOL SUCCINATE 100 MG/1
200 TABLET, EXTENDED RELEASE ORAL DAILY
Status: DISCONTINUED | OUTPATIENT
Start: 2024-01-17 | End: 2024-01-20 | Stop reason: HOSPADM

## 2024-01-17 RX ORDER — POTASSIUM CHLORIDE 7.45 MG/ML
10 INJECTION INTRAVENOUS PRN
Status: DISCONTINUED | OUTPATIENT
Start: 2024-01-17 | End: 2024-01-20 | Stop reason: HOSPADM

## 2024-01-17 RX ORDER — SODIUM CHLORIDE 9 MG/ML
INJECTION, SOLUTION INTRAVENOUS PRN
Status: DISCONTINUED | OUTPATIENT
Start: 2024-01-17 | End: 2024-01-20 | Stop reason: HOSPADM

## 2024-01-17 RX ORDER — POTASSIUM CHLORIDE 20 MEQ/1
40 TABLET, EXTENDED RELEASE ORAL EVERY 6 HOURS
Status: DISPENSED | OUTPATIENT
Start: 2024-01-17 | End: 2024-01-17

## 2024-01-17 RX ORDER — NICOTINE 21 MG/24HR
1 PATCH, TRANSDERMAL 24 HOURS TRANSDERMAL DAILY
Status: DISCONTINUED | OUTPATIENT
Start: 2024-01-17 | End: 2024-01-20 | Stop reason: HOSPADM

## 2024-01-17 RX ADMIN — ATORVASTATIN CALCIUM 40 MG: 40 TABLET, FILM COATED ORAL at 08:02

## 2024-01-17 RX ADMIN — POTASSIUM CHLORIDE 10 MEQ: 7.46 INJECTION, SOLUTION INTRAVENOUS at 00:09

## 2024-01-17 RX ADMIN — POTASSIUM CHLORIDE 10 MEQ: 7.46 INJECTION, SOLUTION INTRAVENOUS at 11:24

## 2024-01-17 RX ADMIN — POTASSIUM CHLORIDE 10 MEQ: 7.46 INJECTION, SOLUTION INTRAVENOUS at 10:01

## 2024-01-17 RX ADMIN — ISOSORBIDE MONONITRATE 30 MG: 30 TABLET, EXTENDED RELEASE ORAL at 08:02

## 2024-01-17 RX ADMIN — POTASSIUM CHLORIDE 40 MEQ: 1500 TABLET, EXTENDED RELEASE ORAL at 08:02

## 2024-01-17 RX ADMIN — METOPROLOL SUCCINATE 200 MG: 100 TABLET, EXTENDED RELEASE ORAL at 08:02

## 2024-01-17 RX ADMIN — POTASSIUM CHLORIDE 10 MEQ: 7.46 INJECTION, SOLUTION INTRAVENOUS at 13:30

## 2024-01-17 RX ADMIN — SODIUM CHLORIDE, PRESERVATIVE FREE 10 ML: 5 INJECTION INTRAVENOUS at 09:15

## 2024-01-17 RX ADMIN — POLYETHYLENE GLYCOL-3350 AND ELECTROLYTES 2000 ML: 236; 6.74; 5.86; 2.97; 22.74 POWDER, FOR SOLUTION ORAL at 18:26

## 2024-01-17 RX ADMIN — SODIUM CHLORIDE, PRESERVATIVE FREE 10 ML: 5 INJECTION INTRAVENOUS at 22:08

## 2024-01-17 RX ADMIN — PANTOPRAZOLE SODIUM 80 MG: 40 INJECTION, POWDER, FOR SOLUTION INTRAVENOUS at 00:03

## 2024-01-17 RX ADMIN — POTASSIUM CHLORIDE 10 MEQ: 7.46 INJECTION, SOLUTION INTRAVENOUS at 08:16

## 2024-01-17 RX ADMIN — AMLODIPINE BESYLATE 10 MG: 10 TABLET ORAL at 08:02

## 2024-01-17 ASSESSMENT — PAIN SCALES - GENERAL: PAINLEVEL_OUTOF10: 0

## 2024-01-17 NOTE — TELEPHONE ENCOUNTER
[address] Patient was called yesterday at approximately 6 p.m. after receiving urgent call from lab.  His hemoglobin was 5.4.  He was advised to go to the emergency.

## 2024-01-17 NOTE — ED NOTES
Blood consent form reviewed and signed by provider    
Called lab as this nurse sent down blood one hour post transfusion and there is an active order in to check H&H one hour post opposed to 4 hour . Shirley in lab states she can not see order so it was not ran. There is not a area to release order and order likely needs to be re-input. This nurse paged Dr. De León to clarify orders. States to check H&H 4Hours post transfusion and to recheck potassium after 4 runs of IV potassium. Verbal orders placed.    
EMS student placed 20g to left AC. Nurse was at bedside whole time and observed and assisted with technique.   
Pts potassium was 2.9 had only one run of IV potassium order states to give 6 doses of IVPB potassium for potassium level of 2.7-3.0. Nurse started second dose and paged provide to see if he would like for other 4 doses given and then repeated EKG prior to Potassium being started.   
TRANSFER - OUT REPORT:    Verbal report given to NICHELLE Bernal on Jonathon Zee  being transferred to [address] [place] [address] for change in patient condition ( )       Report consisted of patient's Situation, Background, Assessment and   Recommendations(SBAR).     Information from the following report(s) Nurse Handoff Report, ED SBAR, Intake/Output, MAR, Cardiac Rhythm  , and Neuro Assessment was reviewed with the receiving nurse.    [place] Fall Assessment:                           Lines:   Peripheral IV 01/16/24 Right Forearm (Active)       Peripheral IV 01/17/24 Distal;Left;Anterior Cephalic (Active)        Opportunity for questions and clarification was provided.      Patient transported with:  Tech       
This Nurse introduced self . Bedside Report received from NICHELLE Milner. Pt was resting on stretcher Nurse introduced self  Transfusion was complete at time of handoff, nurse charted vitals at bedside and assumed care of pt. Annia reports pts potassium was 2.9 and provider states to only do one run of IV potassium. This nure will call dr. De León services to clarify. No order noted for Hgb and Hct recheck will ask provider to place.   
   All other components within normal limits   HEPATIC FUNCTION PANEL - Abnormal; Notable for the following components:    Globulin 4.3 (*)     ALT 14 (*)     All other components within normal limits   BASIC METABOLIC PANEL - Abnormal; Notable for the following components:    Potassium 2.9 (*)     Glucose 109 (*)     All other components within normal limits   OCCULT BLOOD, FECAL - Abnormal; Notable for the following components:    POC Occult Blood, Fecal Positive (*)     All other components within normal limits   LIPASE   PROTIME-INR   APTT   TYPE AND SCREEN   PREPARE RBC (CROSSMATCH)   PREPARE RBC (CROSSMATCH)       All other labs were within normal range or not returned as of this dictation.    EMERGENCY DEPARTMENT COURSE and DIFFERENTIAL DIAGNOSIS/MDM:   Vitals:    Vitals:    01/16/24 2007   BP: (!) 140/63   Pulse: 80   Resp: 18   Temp: 98.1 °F (36.7 °C)   TempSrc: Oral   SpO2: 99%   Weight: 76.2 kg (168 lb)   Height: 1.803 m (5' 11\")         Medical Decision Making  Patient is a 73-year-old male with a history of CAD and alcohol abuse (denies drinking alcohol for 4 years) who presents to the ED from his doctor's office for low hemoglobin of 5.3 on outside labs.  Patient states that he has been mildly short of breath with exertion and has had melanotic stools for the past 3 months but denies any chest pain, lightheadedness, dizziness, or syncope.  No hematochezia.    Patient's vitals are significant for a blood pressure of 140/63 but are otherwise within normal limits and he is not tachycardic.  Patient appears comfortable and nontoxic and is in no acute distress.  He has no tenderness to his abdomen.  On rectal exam, there is brown stool within the rectal vault without obvious active bleeding.  Hemoccult card was sent to lab for analysis.    Concern for upper GI bleed versus lower GI bleed.  Reviewing the patient's previous colonoscopy from 2022 shows that he has a history of diverticulosis but no other

## 2024-01-17 NOTE — PROGRESS NOTES
1145: Pt transported to floor for admission. VS obtained and stable, tele box applied on pt. Pt is alert and awake and was able to walk to the bed during transfer.     4 Eyes Skin Assessment     NAME:  Jonathon Zee  YOB: 1950  MEDICAL RECORD NUMBER:  [id number]    The patient is being assessed for  Admission    I agree that at least one RN has performed a thorough Head to Toe Skin Assessment on the patient. ALL assessment sites listed below have been assessed.      Areas assessed by both nurses:    Head, Face, Ears, Shoulders, Back, Chest, Arms, Elbows, Hands, Sacrum. Buttock, Coccyx, Ischium, Legs. Feet and Heels, and Under Medical Devices         Does the Patient have a Wound? No noted wound(s)       Varinder Prevention initiated by RN: No  Wound Care Orders initiated by RN: No    Pressure Injury (Stage 3,4, Unstageable, DTI, NWPT, and Complex wounds) if present, place Wound referral order by RN under : No    New Ostomies, if present place, Ostomy referral order under : No     Nurse 1 eSignature: Electronically signed by Rianna Hutton RN on 1/17/24 at 3:28 PM EST    **SHARE this note so that the co-signing nurse can place an eSignature**    Nurse 2 eSignature: Electronically signed by NICHELLE Charles on 1/17/24 at 3:28 PM EST       1330: Pt potassium levels at 3.5. Hgb at 7.4, MD notified regarding results.    1740: Sent [facility] message regarding missing dose of Golytely. Waiting on response.     1810: Pharmacy stated that Golytely was delivered to the ED this morning. Will  from ED.

## 2024-01-17 NOTE — PROGRESS NOTES
Review of Systems    Physical Exam  Feet:      Right foot:      Skin integrity: Dry skin present.      Toenail Condition: Right toenails are abnormally thick, long and ingrown.      Left foot:      Skin integrity: Dry skin present.      Toenail Condition: Left toenails are abnormally thick, long and ingrown.   Skin:     General: Skin is warm and dry.             Comments: Dark brown skin moles scattered on upper and lower back.    BLE scattered small scabs.

## 2024-01-17 NOTE — CONSULTS
[email or web address]  [phone]    GASTROENTEROLOGY CONSULT          Impression:   1. Acute anemia - w/ possible intermittent melena.   - 12/2022 Colonoscopy with severe diverticulosis, angiectasias in cecum, and TA polyps w/ 5yr recall  2. Hypokalemia  3. HTN  4. HLD  5. CAD  6. CHF  7. AUD - sober 4yrs per pt.       Plan:     1. Tentatively plan on EGD-colo tomorrow w/ improvement of H/H and K. Bowel prep ordered. Procedure posted to board.   2. Monitor H/H, transfuse if Hgb <7. Recommend iron studies w/ repletion as indicated.   3. IV PPI BID  4. Continue medical management per primary team.    Chief Complaint: acute anemia       HPI:  Jonathon Zee is a 73 y.o. male w/ PMH HTN, HLD, CAD, CHF, AUD presented to the ED 1/16 after being diagnosed with acute anemia w/ PCP. He has noticed black stools intermittently over the last 2 weeks. Patient denies other symptoms including n/v, abd pain, or unexpected weight loss. Denies use of NSAID or iron. Sober from etoh x 4 yrs. ED workup revealed Hgb 5.4, MCV 73.2, PLT nml, K 3.1 with repeat of 2.9.    PMH:   Past Medical History:   Diagnosis Date    CAD (coronary artery disease)     History of alcoholism (HCC)     Personal history      History of echocardiogram 10/24/2012    EF 55-60%.  No WMA.  Mild conc LVH.  Gr 1 DDfx.  RVSP 20-25 mmHg.  Mild LAE.  Mild CARLEE.  Possible sm pericardial effusion but likely fat pad.    History of myocardial perfusion scan 10/24/2012    No ischemia or prior infarction.  EF 58%.  Normal EKG on max EST.  Ex time 6 min.  Low risk.    Hypercholesteremia     Hypertension     Hypertrophy (benign) of prostate     Without urinary obstruction and other lower urinary tract symptoms (LUTS)    Mixed hyperlipidemia     S/P cardiac catheterization 03/23/2001    RCA patent.  m/dRPDA 55%.  LM patent.  LAD patent.  CX patent.  oOMB 75%.  LVEDP 8-10 mmHg.  EF 50%.  Global hypk.  Patent bilateral renal arteries.       PSH:   Past Surgical History:   Procedure        Patient Active Problem List   Diagnosis    Alcohol abuse    CAD (coronary artery disease)    Advance directive discussed with patient    History of alcoholism (HCC)    Hypertension    Tobacco use disorder    Mixed hyperlipidemia    Hypercholesteremia    Recent unintentional weight loss over several months    Anemia    Gastrointestinal hemorrhage       Home Medications:     [unfilled]    Review of Systems:     Constitutional: No fevers, chills, weight loss, fatigue.   Skin: No rashes, pruritis, jaundice, ulcerations, erythema.   HENT: No headaches, nosebleeds, sinus pressure, rhinorrhea, sore throat.   Eyes: No visual changes, blurred vision, eye pain, photophobia, jaundice.   Cardiovascular: No chest pain, heart palpitations.   Respiratory: No cough, SOB, wheezing, chest discomfort, orthopnea.   Gastrointestinal: See HPI    Genitourinary: No dysuria, bleeding, discharge, pyuria.   Musculoskeletal: No weakness, arthralgias, wasting.   Endo: No sweats.   Heme: No bruising, easy bleeding.   Allergies: As noted.   Neurological: Cranial nerves intact.  Alert and oriented. Gait not assessed.   Psychiatric:  No anxiety, depression, hallucinations.          /70   Pulse 72   Temp 98.5 °F (36.9 °C) (Oral)   Resp 15   Ht 1.803 m (5' 11\")   Wt 76.2 kg (168 lb)   SpO2 99%   BMI 23.43 kg/m²     Physical Assessment:     constitutional: normal habitus, in no acute distress.   skin: no rashes, ulcers, icterus or other lesions  eyes: normal conjunctivae and lids; no jaundice pupils: normal  HEENT: normocephalic, atraumatic  neck: supple, normal ROM   respiratory: normal chest excursion; no intercostal retraction or accessory muscle use  cardiovascular: regular rate and rhythm  abdominal: non-distended, active bowel sounds, soft, non-tender, non-acute, no palpable masses or hernias. liver/spleen: not palpable.   extremities: no significant deformity or contracture, no edema. Gait not assessed   neurologic:

## 2024-01-17 NOTE — PROGRESS NOTES
completed the initial Spiritual Assessment of the patient, and offered Pastoral Care support to the patient in bed 11 of the emergency room where he will be admitted to the hospital from with anemia., There is no advance directive on file. Patient does not have any Christian/cultural needs that will affect patient’s preferences in health care. Chaplains will continue to follow and will provide pastoral care on an as needed/requested basis.    Chaplain Maged Box  Board Certified   Spiritual Care Department  [phone]

## 2024-01-17 NOTE — ACP (ADVANCE CARE PLANNING)
Advance Care Planning     Advance Care Planning Inpatient Note  [facility]    Today's Date: 1/17/2024  Unit: [facility]    Received request from .  Upon review of chart and communication with care team, patient's decision making abilities are not in question.. Patient was/were present in the room during visit.    Goals of ACP Conversation:  Discuss advance care planning documents    Health Care Decision Makers:       Primary Decision Maker: Susan Valente - Other - [phone]    Secondary Decision Maker: Guillermo Zee - Other - [phone]  Summary:  No Decision Maker named by patient at this time    Advance Care Planning Documents (Patient Wishes):  None     Assessment:  Patient is in bed 11 of the emergency room where he will be admitted to the hospital from with anemia.. There is no advance directive on file.     Interventions:  Patient DECLINED ACP conversation    Care Preferences Communicated:   No    Outcomes/Plan:      Electronically signed by Toni Box Jr., [facility] on 1/17/2024 at 12:31 PM

## 2024-01-17 NOTE — H&P
History & Physical    Patient: Jonathon Zee MRN: [id number]  CSN: [id number]    YOB: 1950  Age: 73 y.o.  Sex: male      DOA: 1/16/2024    Chief Complaint   Patient presents with    Abnormal Lab         Dragon medical dictation software was used for portions of this report. Unintended errors may occur. If you have any questions regarding the note or its content please set up a time to discuss by calling the nurse on the floor.     Assessment/Plan       Severe anemia: Symptomatic.  Patient has been given 1 unit PRBC in the emergency room.  GI has been consulted and will keep the patient n.p.o. at this time.  Given a dose of IV Protonix.  Patient would need an endoscopy.    Hypokalemia: Replace orally and IV and recheck labs.  Monitor on telemetry.    Hypertension: Continue Norvasc, Toprol-XL.  Monitor blood pressures closely.  Avoid any hypotension.    Coronary artery disease: Will continue Lipitor, Imdur, Toprol-XL.  Hold off on any aspirin at this time.    Chronic alcohol use: No recent use at this time.  LFTs normal at this time.    DVT prophylaxis: SCDs at this time.  Cannot use heparin products due to active bleeding.    Principal Problem:    Anemia  Active Problems:    Gastrointestinal hemorrhage  Resolved Problems:    * No resolved hospital problems. *       HPI:     Jonathon Zee is a 73 y.o. male who has a known history of coronary artery disease and alcohol use presented to the emergency room with complaints of low hemoglobin.  Patient had a routine preop visit to his primary care physician for his cataract surgery and routine labs done showed a hemoglobin of 5.3.  Patient has been having shortness of breath with activity and for the last 3 months has been having melena.  Patient denies any complaints of chest pain dizziness  headache nausea vomiting diarrhea abdominal pain.  Patient does drink significantly for several years but no alcohol use in the last 4 years.  Patient does  MG/DL    LDL Calculated 89 0 - 100 MG/DL    VLDL Cholesterol Calculated 8 MG/DL    Chol/HDL Ratio 3.4 0 - 5.0     PSA Screening    Collection Time: 01/16/24 10:10 AM   Result Value Ref Range    PSA 0.5 0.0 - 4.0 ng/mL   Vitamin D 25 Hydroxy    Collection Time: 01/16/24 10:10 AM   Result Value Ref Range    Vit D, 25-Hydroxy 16.0 (L) 30 - 100 ng/mL   CBC with Auto Differential    Collection Time: 01/16/24  8:10 PM   Result Value Ref Range    WBC 8.0 4.6 - 13.2 K/uL    RBC 2.96 (L) 4.35 - 5.65 M/uL    Hemoglobin 5.8 (LL) 13.0 - 16.0 g/dL    Hematocrit 21.1 (L) 36.0 - 48.0 %    MCV 71.3 (L) 78.0 - 100.0 FL    MCH 19.6 (L) 24.0 - 34.0 PG    MCHC 27.5 (L) 31.0 - 37.0 g/dL    RDW 17.4 (H) 11.6 - 14.5 %    Platelets 237 135 - 420 K/uL    Nucleated RBCs 0.0 0  WBC    nRBC 0.00 0.00 - 0.01 K/uL    Neutrophils % 72 40 - 73 %    Lymphocytes % 16 (L) 21 - 52 %    Monocytes % 10 3 - 10 %    Eosinophils % 1 0 - 5 %    Basophils % 1 0 - 2 %    Immature Granulocytes 0 0.0 - 0.5 %    Neutrophils Absolute 5.7 1.8 - 8.0 K/UL    Lymphocytes Absolute 1.3 0.9 - 3.6 K/UL    Monocytes Absolute 0.8 0.05 - 1.2 K/UL    Eosinophils Absolute 0.1 0.0 - 0.4 K/UL    Basophils Absolute 0.1 0.0 - 0.1 K/UL    Absolute Immature Granulocyte 0.0 0.00 - 0.04 K/UL    Differential Type AUTOMATED     Hepatic Function Panel    Collection Time: 01/16/24  8:10 PM   Result Value Ref Range    Total Protein 8.1 6.4 - 8.2 g/dL    Albumin 3.8 3.4 - 5.0 g/dL    Globulin 4.3 (H) 2.0 - 4.0 g/dL    Albumin/Globulin Ratio 0.9 0.8 - 1.7      Total Bilirubin 0.6 0.2 - 1.0 MG/DL    Bilirubin, Direct 0.1 0.0 - 0.2 MG/DL    Alk Phosphatase 109 45 - 117 U/L    AST 29 10 - 38 U/L    ALT 14 (L) 16 - 61 U/L   [facility]    Collection Time: 01/16/24  8:10 PM   Result Value Ref Range    Sodium 138 136 - 145 mmol/L    Potassium 2.9 (LL) 3.5 - 5.5 mmol/L    Chloride 107 100 - 111 mmol/L    CO2 26 21 - 32 mmol/L    Anion Gap 5 3.0 - 18 mmol/L    Glucose 109 (H) 74 - 99 mg/dL    BUN 12

## 2024-01-17 NOTE — PROGRESS NOTES
Patient seen and evaluated, lying in bed, no acute distress.  Patient admitted by my colleague earlier this morning.  73-year-old male with a history of coronary artery disease, alcohol use presents to the emergency room with complaints of low hemoglobin.  Patient had a routine preop visit with his primary care physician for cataract surgery and noted to have a hemoglobin of 5.3.  Patient has been complaining of shortness of breath.  Patient noted to be Hemoccult positive.  GI consulted, plan for EGD and colonoscopy in AM.  Anemia-transfuse to keep hemoglobin greater than 7.  Further treatment plan as outlined in H&P, will continue to follow.

## 2024-01-17 NOTE — PROGRESS NOTES
[facility]  Progress Note    Patient: Jonathon Zee Age: 73 y.o. : 1950 MR#: [id number] SSN:   Date/Time: 2024     Subjective:       *ATTENTION:  This note has been created by a medical student for educational purposes only.  Please do not refer to the content of this note for clinical decision-making, billing, or other purposes.  Please see attending physician’s note to obtain clinical information on this patient.*       73 year old AA male with a history of CAD, HTN, hyperlipidemia, hypercholestermia, ETOH abuse and 2 pack a day smoking x 48 years, was sent to the ED by his PCP for anemia. PT is scheduled for cataracts surgery and went to his PCP for surgery pre-opt sign off. His PCP noticed he was anemia and sent him to the ED. PT denied bloody stool, vomiting, fever, chills, CP or SOB. He did admit to having some darker stool recently but denied noticing any blood. He also noticed a 30+ lb unintentional weight loss in the past 8 months. He used to be a heavy drinker but stopped drinking 3 years ago. States he has a normal diet and eats meat weekly, no diet restrictions at home. He lives alone, denied recent travel.         Assessment/Plan:   Anemic  Original Hbg was 5.4  Positive hemoccult   GI consult - EGD & colonoscopy tomorrow   CT  22 - Subcentimeter hypodensities in the liver are likely small cysts considering their conspicuity at such small size  Hypokalemia  Original potassium - 2.9  Hx of HTN  Continue Isosorbide  Continue Metoprolol   Monitor BP  Hx of CAD  Continue Statin  Continue Hold ASA  Hyperlipidemia & hypercholesteremia  Continue Statin  Smoker  2 packs a day for 48 years, still smoking  Nicotine patch            KIRAN Allen  24      Case discussed with:  [x]Patient  []Family  []Nursing  []Case Management  DVT Prophylaxis:  []Lovenox  []Hep SQ  [x]SCDs  []Coumadin   []On Heparin gtt    Objective:   VS:  2 %    Immature Granulocytes 0 0.0 - 0.5 %    Neutrophils Absolute 5.7 1.8 - 8.0 K/UL    Lymphocytes Absolute 1.3 0.9 - 3.6 K/UL    Monocytes Absolute 0.8 0.05 - 1.2 K/UL    Eosinophils Absolute 0.1 0.0 - 0.4 K/UL    Basophils Absolute 0.1 0.0 - 0.1 K/UL    Absolute Immature Granulocyte 0.0 0.00 - 0.04 K/UL    Differential Type AUTOMATED     Hepatic Function Panel    Collection Time: 01/16/24  8:10 PM   Result Value Ref Range    Total Protein 8.1 6.4 - 8.2 g/dL    Albumin 3.8 3.4 - 5.0 g/dL    Globulin 4.3 (H) 2.0 - 4.0 g/dL    Albumin/Globulin Ratio 0.9 0.8 - 1.7      Total Bilirubin 0.6 0.2 - 1.0 MG/DL    Bilirubin, Direct 0.1 0.0 - 0.2 MG/DL    Alk Phosphatase 109 45 - 117 U/L    AST 29 10 - 38 U/L    ALT 14 (L) 16 - 61 U/L   BMP    Collection Time: 01/16/24  8:10 PM   Result Value Ref Range    Sodium 138 136 - 145 mmol/L    Potassium 2.9 (LL) 3.5 - 5.5 mmol/L    Chloride 107 100 - 111 mmol/L    CO2 26 21 - 32 mmol/L    Anion Gap 5 3.0 - 18 mmol/L    Glucose 109 (H) 74 - 99 mg/dL    BUN 12 7.0 - 18 MG/DL    Creatinine 0.84 0.6 - 1.3 MG/DL    Bun/Cre Ratio 14 12 - 20      Est, Glom Filt Rate >60 >60 ml/min/1.73m2    Calcium 9.0 8.5 - 10.1 MG/DL   Lipase    Collection Time: 01/16/24  8:10 PM   Result Value Ref Range    Lipase 20 13 - 75 U/L   Protime-INR    Collection Time: 01/16/24  8:10 PM   Result Value Ref Range    Protime 14.2 11.9 - 14.7 sec    INR 1.1 0.9 - 1.1     APTT    Collection Time: 01/16/24  8:10 PM   Result Value Ref Range    PTT 28.6 23.0 - 36.4 SEC   TYPE AND SCREEN    Collection Time: 01/16/24  9:20 PM   Result Value Ref Range    Crossmatch expiration date 01/19/2024,1823     ABO/Rh O POSITIVE     Antibody Screen NEG     CALLED TO ROB TOBAR, AT 22:26 ON 1/16/2024 BY [facility]     Called To: GERRY IN ED, 0047, 1/17/24, BY ARIELLA     Unit Number [id number]     Product Code Blood Bank RC LR     Unit Divison 00     Dispense Status Blood Bank ISSUED     Crossmatch Result Compatible     Unit Number

## 2024-01-18 ENCOUNTER — ANESTHESIA (OUTPATIENT)
Facility: HOSPITAL | Age: 74
End: 2024-01-18
Payer: MEDICARE

## 2024-01-18 LAB
HCT VFR BLD AUTO: 24.7 % (ref 36–48)
HGB BLD-MCNC: 7.3 G/DL (ref 13–16)
POTASSIUM SERPL-SCNC: 3.7 MMOL/L (ref 3.5–5.5)

## 2024-01-18 PROCEDURE — 6360000002 HC RX W HCPCS: Performed by: INTERNAL MEDICINE

## 2024-01-18 PROCEDURE — 0W3P8ZZ CONTROL BLEEDING IN GASTROINTESTINAL TRACT, VIA NATURAL OR ARTIFICIAL OPENING ENDOSCOPIC: ICD-10-PCS | Performed by: INTERNAL MEDICINE

## 2024-01-18 PROCEDURE — 6370000000 HC RX 637 (ALT 250 FOR IP): Performed by: STUDENT IN AN ORGANIZED HEALTH CARE EDUCATION/TRAINING PROGRAM

## 2024-01-18 PROCEDURE — 3700000001 HC ADD 15 MINUTES (ANESTHESIA): Performed by: INTERNAL MEDICINE

## 2024-01-18 PROCEDURE — 0DB78ZX EXCISION OF STOMACH, PYLORUS, VIA NATURAL OR ARTIFICIAL OPENING ENDOSCOPIC, DIAGNOSTIC: ICD-10-PCS | Performed by: INTERNAL MEDICINE

## 2024-01-18 PROCEDURE — 36415 COLL VENOUS BLD VENIPUNCTURE: CPT

## 2024-01-18 PROCEDURE — 7100000000 HC PACU RECOVERY - FIRST 15 MIN: Performed by: INTERNAL MEDICINE

## 2024-01-18 PROCEDURE — 88305 TISSUE EXAM BY PATHOLOGIST: CPT

## 2024-01-18 PROCEDURE — 3700000000 HC ANESTHESIA ATTENDED CARE: Performed by: INTERNAL MEDICINE

## 2024-01-18 PROCEDURE — 0DBM8ZX EXCISION OF DESCENDING COLON, VIA NATURAL OR ARTIFICIAL OPENING ENDOSCOPIC, DIAGNOSTIC: ICD-10-PCS | Performed by: INTERNAL MEDICINE

## 2024-01-18 PROCEDURE — 85018 HEMOGLOBIN: CPT

## 2024-01-18 PROCEDURE — 2580000003 HC RX 258: Performed by: INTERNAL MEDICINE

## 2024-01-18 PROCEDURE — 99232 SBSQ HOSP IP/OBS MODERATE 35: CPT | Performed by: INTERNAL MEDICINE

## 2024-01-18 PROCEDURE — 2580000003 HC RX 258: Performed by: HOSPITALIST

## 2024-01-18 PROCEDURE — 7100000010 HC PHASE II RECOVERY - FIRST 15 MIN: Performed by: INTERNAL MEDICINE

## 2024-01-18 PROCEDURE — 2580000003 HC RX 258: Performed by: NURSE ANESTHETIST, CERTIFIED REGISTERED

## 2024-01-18 PROCEDURE — 3600007503: Performed by: INTERNAL MEDICINE

## 2024-01-18 PROCEDURE — 2709999900 HC NON-CHARGEABLE SUPPLY: Performed by: INTERNAL MEDICINE

## 2024-01-18 PROCEDURE — 84132 ASSAY OF SERUM POTASSIUM: CPT

## 2024-01-18 PROCEDURE — 2500000003 HC RX 250 WO HCPCS: Performed by: ANESTHESIOLOGY

## 2024-01-18 PROCEDURE — 3600007513: Performed by: INTERNAL MEDICINE

## 2024-01-18 PROCEDURE — 6360000002 HC RX W HCPCS: Performed by: ANESTHESIOLOGY

## 2024-01-18 PROCEDURE — 1100000003 HC PRIVATE W/ TELEMETRY

## 2024-01-18 PROCEDURE — 85014 HEMATOCRIT: CPT

## 2024-01-18 PROCEDURE — 2720000010 HC SURG SUPPLY STERILE: Performed by: INTERNAL MEDICINE

## 2024-01-18 RX ORDER — EPHEDRINE SULFATE/0.9% NACL/PF 50 MG/5 ML
SYRINGE (ML) INTRAVENOUS PRN
Status: DISCONTINUED | OUTPATIENT
Start: 2024-01-18 | End: 2024-01-18 | Stop reason: SDUPTHER

## 2024-01-18 RX ORDER — LIDOCAINE HYDROCHLORIDE 20 MG/ML
INJECTION, SOLUTION EPIDURAL; INFILTRATION; INTRACAUDAL; PERINEURAL PRN
Status: DISCONTINUED | OUTPATIENT
Start: 2024-01-18 | End: 2024-01-18 | Stop reason: SDUPTHER

## 2024-01-18 RX ORDER — SODIUM CHLORIDE, SODIUM LACTATE, POTASSIUM CHLORIDE, CALCIUM CHLORIDE 600; 310; 30; 20 MG/100ML; MG/100ML; MG/100ML; MG/100ML
INJECTION, SOLUTION INTRAVENOUS CONTINUOUS
Status: DISCONTINUED | OUTPATIENT
Start: 2024-01-18 | End: 2024-01-18 | Stop reason: HOSPADM

## 2024-01-18 RX ORDER — PROPOFOL 10 MG/ML
INJECTION, EMULSION INTRAVENOUS PRN
Status: DISCONTINUED | OUTPATIENT
Start: 2024-01-18 | End: 2024-01-18 | Stop reason: SDUPTHER

## 2024-01-18 RX ORDER — SODIUM CHLORIDE 0.9 % (FLUSH) 0.9 %
5-40 SYRINGE (ML) INJECTION EVERY 12 HOURS SCHEDULED
Status: DISCONTINUED | OUTPATIENT
Start: 2024-01-18 | End: 2024-01-18 | Stop reason: HOSPADM

## 2024-01-18 RX ORDER — LIDOCAINE HYDROCHLORIDE 10 MG/ML
1 INJECTION, SOLUTION EPIDURAL; INFILTRATION; INTRACAUDAL; PERINEURAL
Status: DISCONTINUED | OUTPATIENT
Start: 2024-01-18 | End: 2024-01-18 | Stop reason: HOSPADM

## 2024-01-18 RX ORDER — PHENYLEPHRINE HCL IN 0.9% NACL 1 MG/10 ML
SYRINGE (ML) INTRAVENOUS PRN
Status: DISCONTINUED | OUTPATIENT
Start: 2024-01-18 | End: 2024-01-18 | Stop reason: SDUPTHER

## 2024-01-18 RX ADMIN — Medication 100 MCG: at 10:30

## 2024-01-18 RX ADMIN — Medication 100 MCG: at 10:26

## 2024-01-18 RX ADMIN — PROPOFOL 25 MG: 10 INJECTION, EMULSION INTRAVENOUS at 10:32

## 2024-01-18 RX ADMIN — PROPOFOL 50 MG: 10 INJECTION, EMULSION INTRAVENOUS at 10:22

## 2024-01-18 RX ADMIN — PROPOFOL 25 MG: 10 INJECTION, EMULSION INTRAVENOUS at 10:30

## 2024-01-18 RX ADMIN — Medication 100 MCG: at 10:36

## 2024-01-18 RX ADMIN — PROPOFOL 50 MG: 10 INJECTION, EMULSION INTRAVENOUS at 10:16

## 2024-01-18 RX ADMIN — Medication 100 MCG: at 10:33

## 2024-01-18 RX ADMIN — PROPOFOL 25 MG: 10 INJECTION, EMULSION INTRAVENOUS at 10:26

## 2024-01-18 RX ADMIN — PROPOFOL 25 MG: 10 INJECTION, EMULSION INTRAVENOUS at 10:34

## 2024-01-18 RX ADMIN — PROPOFOL 50 MG: 10 INJECTION, EMULSION INTRAVENOUS at 10:19

## 2024-01-18 RX ADMIN — SODIUM CHLORIDE 300 MG: 9 INJECTION, SOLUTION INTRAVENOUS at 19:24

## 2024-01-18 RX ADMIN — PROPOFOL 50 MG: 10 INJECTION, EMULSION INTRAVENOUS at 10:20

## 2024-01-18 RX ADMIN — PROPOFOL 20 MG: 10 INJECTION, EMULSION INTRAVENOUS at 10:45

## 2024-01-18 RX ADMIN — POLYETHYLENE GLYCOL-3350 AND ELECTROLYTES 2000 ML: 236; 6.74; 5.86; 2.97; 22.74 POWDER, FOR SOLUTION ORAL at 00:00

## 2024-01-18 RX ADMIN — SODIUM CHLORIDE, PRESERVATIVE FREE 10 ML: 5 INJECTION INTRAVENOUS at 22:15

## 2024-01-18 RX ADMIN — Medication 10 MG: at 10:36

## 2024-01-18 RX ADMIN — SODIUM CHLORIDE, PRESERVATIVE FREE 10 ML: 5 INJECTION INTRAVENOUS at 09:00

## 2024-01-18 RX ADMIN — SODIUM CHLORIDE, POTASSIUM CHLORIDE, SODIUM LACTATE AND CALCIUM CHLORIDE: 600; 310; 30; 20 INJECTION, SOLUTION INTRAVENOUS at 09:42

## 2024-01-18 RX ADMIN — LIDOCAINE HYDROCHLORIDE 5 MG: 20 INJECTION, SOLUTION EPIDURAL; INFILTRATION; INTRACAUDAL; PERINEURAL at 10:17

## 2024-01-18 RX ADMIN — PROPOFOL 20 MG: 10 INJECTION, EMULSION INTRAVENOUS at 10:38

## 2024-01-18 ASSESSMENT — PAIN SCALES - GENERAL
PAINLEVEL_OUTOF10: 0

## 2024-01-18 ASSESSMENT — PAIN - FUNCTIONAL ASSESSMENT: PAIN_FUNCTIONAL_ASSESSMENT: 0-10

## 2024-01-18 ASSESSMENT — LIFESTYLE VARIABLES: SMOKING_STATUS: 1

## 2024-01-18 NOTE — ANESTHESIA POSTPROCEDURE EVALUATION
Department of Anesthesiology  Postprocedure Note    Patient: Jonathon Zee  MRN: [id number]  YOB: 1950  Date of evaluation: 1/18/2024    Procedure Summary       Date: 01/18/24 Room / Location: [facility] ENDO 02 / [facility] ENDOSCOPY    Anesthesia Start: 1006 Anesthesia Stop: 1103    Procedures:       EGD ESOPHAGOGASTRODUODENOSCOPY with bxs (Upper GI Region)      COLONOSCOPY with polypectomy (Abdomen) Diagnosis:       Anemia, unspecified type      Melena      (Anemia, unspecified type [D64.9])      (Melena [K92.1])    Surgeons: Lukasz Fletcher MD Responsible Provider: Vlad Goodrich MD    Anesthesia Type: MAC ASA Status: 3            Anesthesia Type: MAC    Fanny Phase I: Fanny Score: 10    Fanny Phase II: Fanny Score: 10    Anesthesia Post Evaluation    Patient location during evaluation: bedside  Airway patency: patent  Cardiovascular status: hemodynamically stable  Respiratory status: acceptable  Hydration status: stable  Pain management: adequate    No notable events documented.

## 2024-01-18 NOTE — OP NOTE
diverticulosis seen in the sigmoid, descending and transverse colon.  Ascending Colon: 8 mm AVM noted in the ascending colon with stigmata of recent bleeding-cauterized with APC with standard settings with good effect  Cecum: 6 AVMs seen in the cecum-range from 2 mm to 6 mm-the larger ones have some recent stigmata of bleeding; cauterized with APC with standard settings with good effect  Terminal Ileum: normal    Interventions:  biopsy of stomach body, antrum; APC for Cecum/Ascending colon AVM    Specimen Removed:    ID Type Source Tests Collected by Time Destination   A : gastric bxs Tissue Gastric SURGICAL PATHOLOGY Lukasz Fletcher MD 1/18/2024 1024    B : descending polyp Tissue Colon-Descending SURGICAL PATHOLOGY Lukasz Fletcher MD 1/18/2024 1055        Complications: None. patient tolerated the procedure well.    Devices/implants/grafts/tissues/prosthesis: None    EBL:  None.           Impression:      AVM in the cecum and ascending colon-cauterized-likely the source for bleeding; also had diverticulosis, descending colon polyp.  Biopsies taken from the stomach, EGD was normal    Recommendations:    1.  Resume diet.  2.  Watch for rebleeding.  3.  Await biopsies.  4.  Iron infusion prior to discharge.  5.  As long as he remains stable, can consider discharge tomorrow.      Lukasz Fletcher MD  1/18/2024  11:05 AM

## 2024-01-18 NOTE — PROGRESS NOTES
0710: Bedside shift change report given to NICHELLE Charles (oncoming nurse) by NICHELLE Judge (offgoing nurse). Report included the following information Nurse Handoff Report, Adult Overview, Intake/Output, MAR, and Cardiac Rhythm NSR .     0755: Rounding on patient. Patient's alert, verbal, and stable. No complaints of pain. No signs of distress, will continue to monitor patient remainder of shift.    0840: Report given to NICHELLE Davis.     0845:Spoke with MD regarding off Tele order. Patient's vital signs are stable. Telephone order with read back given to take patient off tele.     0855: Patient left the unit to go to the endo suite.     1115: Report given to NICHELLE Charles by NICHELLE Felder.     1130: Patient arrived to the unit via bed. Patient's on room air and alert. Vital signs remain stable. No complaints of pain. Will continue to monitor patient remainder of shift.     1600: Family at bedside.     1915: Bedside shift change report given to NICHELLE Guy (oncoming nurse) by NICHELLE Charles (offgoing nurse). Report included the following information Nurse Handoff Report, Adult Overview, Intake/Output, MAR, and Cardiac Rhythm NSR .

## 2024-01-18 NOTE — CARE COORDINATION
01/18/24 1218   Service Assessment   Patient Orientation Alert and Oriented   Cognition Alert   History Provided By Patient   Primary Caregiver Self   Support Systems Family Members   PCP Verified by CM Yes   Last Visit to PCP Within last 3 months   Prior Functional Level Independent in ADLs/IADLs   Current Functional Level Independent in ADLs/IADLs   Can patient return to prior living arrangement Yes   Ability to make needs known: Good   Family able to assist with home care needs: Yes   Social/Functional History   Lives With Alone   Type of Home House   Home Layout One level   Home Access Level entry   Bathroom Shower/Tub Tub/Shower unit   Bathroom Toilet Standard   Bathroom Equipment None   Bathroom Accessibility Accessible   Receives Help From Family   ADL Assistance Independent   Homemaking Assistance Independent   Homemaking Responsibilities No   Ambulation Assistance Independent   Transfer Assistance Independent   Occupation Retired   Type of Occupation [employer]    Discharge Planning   Type of Residence House   Living Arrangements Alone   DME Ordered? No   Type of Home Care Services None   Patient expects to be discharged to: House   One/Two Story Residence One story   History of falls? 0   Services At/After Discharge   Mode of Transport at Discharge Self     Case Management Assessment  Initial Evaluation    Date/Time of Evaluation: 1/18/2024 12:24 PM  Assessment Completed by: Adriana Lofton    If patient is discharged prior to next notation, then this note serves as note for discharge by case management.    Patient Name: Jonathon Zee                   YOB: 1950  Diagnosis: Hypokalemia [E87.6]  Gastrointestinal hemorrhage [K92.2]  Anemia [D64.9]  Upper GI bleed [K92.2]  Anemia, unspecified type [D64.9]                   Date / Time: 1/16/2024  8:12 PM    Patient Admission Status: Inpatient   Readmission Risk (Low < 19, Mod (19-27), High > 27): Readmission Risk Score:  facilitating achievement of predicted outcomes: Motivated, Cooperative, Pleasant, and Good insight into deficits    Barriers to discharge: Medical complications    Additional Case Management Notes: none    The Plan for Transition of Care is related to the following treatment goals of Hypokalemia [E87.6]  Gastrointestinal hemorrhage [K92.2]  Anemia [D64.9]  Upper GI bleed [K92.2]  Anemia, unspecified type [D64.9]    IF APPLICABLE: The Patient and/or patient representative Jonathon and his family were provided with a choice of provider and agrees with the discharge plan. Freedom of choice list with basic dialogue that supports the patient's individualized plan of care/goals and shares the quality data associated with the providers was provided to:     Patient Representative Name:       The Patient and/or Patient Representative Agree with the Discharge Plan?      Adriana Lofton  Case Management Department  Ph: [phone] Fax:

## 2024-01-18 NOTE — PERIOP NOTE
TRANSFER - OUT REPORT:    Verbal report given to NICHELLE Charles on Jonathon Zee  being transferred to  for routine progression of patient care       Report consisted of patient's Situation, Background, Assessment and   Recommendations(SBAR).     Information from the following report(s) Nurse Handoff Report was reviewed with the receiving nurse.           Lines:   Peripheral IV 01/16/24 Right Forearm (Active)   Site Assessment Clean, dry & intact 01/18/24 0859   Line Status Capped 01/18/24 0859   Line Care Cap changed;Connections checked and tightened 01/18/24 0859   Phlebitis Assessment No symptoms 01/18/24 0859   Infiltration Assessment 0 01/18/24 0859   Alcohol Cap Used Yes 01/18/24 0859   Dressing Status Clean, dry & intact 01/18/24 0859   Dressing Type Transparent 01/18/24 0859       Peripheral IV 01/17/24 Distal;Left;Anterior Cephalic (Active)        Opportunity for questions and clarification was provided.      Patient transported with:  Tech

## 2024-01-18 NOTE — PERIOP NOTE
TRANSFER - IN REPORT:    Verbal report received from Olivia STEWARD on Jonathon G Zee  being received from 358 for routine progression of patient care      Report consisted of patient's Situation, Background, Assessment and   Recommendations(SBAR).     Information from the following report(s) Nurse Handoff Report was reviewed with the receiving nurse.    Opportunity for questions and clarification was provided.      Assessment completed upon patient's arrival to unit and care assumed.

## 2024-01-18 NOTE — H&P
[phone]    GASTROENTEROLOGY Pre-Procedure H and P      Impression/Plan:   1. This patient is consented for an EGD and colonoscopy for Anemia, concern for GI bleeding source       Chief Complaint: Anemia, concern for GI bleeding source    HPI:  Jonathon Zee is a 73 y.o. male who is having an EGD and colonoscopy for Anemia, concern for GI bleeding source  PMH:   Past Medical History:   Diagnosis Date    CAD (coronary artery disease)     History of alcoholism (HCC)     Personal history      History of echocardiogram 10/24/2012    EF 55-60%.  No WMA.  Mild conc LVH.  Gr 1 DDfx.  RVSP 20-25 mmHg.  Mild LAE.  Mild CARLEE.  Possible sm pericardial effusion but likely fat pad.    History of myocardial perfusion scan 10/24/2012    No ischemia or prior infarction.  EF 58%.  Normal EKG on max EST.  Ex time 6 min.  Low risk.    Hypercholesteremia     Hypertension     Hypertrophy (benign) of prostate     Without urinary obstruction and other lower urinary tract symptoms (LUTS)    Mixed hyperlipidemia     S/P cardiac catheterization 03/23/2001    RCA patent.  m/dRPDA 55%.  LM patent.  LAD patent.  CX patent.  oOMB 75%.  LVEDP 8-10 mmHg.  EF 50%.  Global hypk.  Patent bilateral renal arteries.       PSH:   Past Surgical History:   Procedure Laterality Date    CARDIAC CATHETERIZATION  03/23/2001    COLONOSCOPY  10/2015    neg; h/o polyps    COLONOSCOPY N/A 12/7/2022    COLONOSCOPY with Polypectomies performed by CHRISTY Palacios MD at [facility] ENDOSCOPY    OTHER SURGICAL HISTORY      sgy to correct club foot       Social HX:   Social History     Socioeconomic History    Marital status: Single     Spouse name: Not on file    Number of children: Not on file    Years of education: Not on file    Highest education level: Not on file   Occupational History    Not on file   Tobacco Use    Smoking status: Every Day    Smokeless tobacco: Never   Substance and Sexual Activity    Alcohol use: Not Currently    Drug use: No    Sexual    Allergies: As noted.   Neurological: Cranial nerves intact.  Alert and oriented. Gait not assessed.   Psychiatric:  No anxiety, depression, hallucinations.          BP (!) 151/67   Pulse 80   Temp 98.2 °F (36.8 °C) (Oral)   Resp 14   Ht 1.803 m (5' 11\")   Wt 76.2 kg (168 lb)   SpO2 100%   BMI 23.43 kg/m²     Physical Assessment:     constitutional: appearance: well developed, well nourished, normal habitus, no deformities, in no acute distress.   skin: inspection: no rashes, ulcers, icterus or other lesions; no clubbing or telangiectasias. palpation: no induration or subcutaneos nodules.   eyes: inspection: normal conjunctivae and lids; no jaundice pupils: normal  ENMT: mouth: normal oral mucosa,lips and gums; good dentition. oropharynx: normal tongue, hard and soft palate; posterior pharynx without erithema, exudate or lesions.   neck: thyroid: normal size, consistency and position; no masses or tenderness.   respiratory: effort: normal chest excursion; no intercostal retraction or accessory muscle use.   cardiovascular: abdominal aorta: normal size and position; no bruits. palpation: PMI of normal size and position; normal rhythm; no thrill or murmurs.   abdominal: abdomen: normal consistency; no tenderness or masses. hernias: no hernias appreciated. liver: normal size and consistency. spleen: not palpable.   rectal: hemoccult/guaiac: not performed.   musculoskeletal: digits and nails: no clubbing, cyanosis, petechiae or other inflammatory conditions. gait: normal gait and station head and neck: normal range of motion; no pain, crepitation or contracture. spine/ribs/pelvis: normal range of motion; no pain, deformity or contracture.   neurologic: cranial nerves: II-XII normal.   psychiatric: judgement/insight: within normal limits. memory: within normal limits for recent and remote events. mood and affect: no evidence of depression, anxiety or agitation. orientation: oriented to time, space and person.

## 2024-01-18 NOTE — PLAN OF CARE
Problem: Discharge Planning  Goal: Discharge to home or other facility with appropriate resources  Recent Flowsheet Documentation  Taken 1/18/2024 1964 by Araceli King RN  Discharge to home or other facility with appropriate resources:   Identify barriers to discharge with patient and caregiver   Arrange for needed discharge resources and transportation as appropriate   Identify discharge learning needs (meds, wound care, etc)   Arrange for interpreters to assist at discharge as needed

## 2024-01-18 NOTE — ANESTHESIA PRE PROCEDURE
Department of Anesthesiology  Preprocedure Note       Name:  Jonathon Zee   Age:  73 y.o.  :  1950                                          MRN:  [id number]         Date:  2024      Surgeon: Surgeon(s):  Lukasz Fletcher MD    Procedure: Procedure(s):  EGD ESOPHAGOGASTRODUODENOSCOPY  COLONOSCOPY    Medications prior to admission:   Prior to Admission medications    Medication Sig Start Date End Date Taking? Authorizing Provider   atorvastatin (LIPITOR) 40 MG tablet Take 1 tablet by mouth daily 24   Margie Bajwa MD   amLODIPine (NORVASC) 10 MG tablet Take 1 tablet by mouth daily high blood pressure 24   Margie Bajwa MD   nicotine polacrilex (NICORETTE) 2 MG gum Take 1 each by mouth as needed for Smoking cessation  Patient not taking: Reported on 2023   Tarun Santillan MD   aspirin 325 MG tablet Take 1 tablet by mouth daily 10/27/22   Automatic Reconciliation, Ar   isosorbide mononitrate (IMDUR) 30 MG extended release tablet Take 1 tablet by mouth daily 10/27/22   Automatic Reconciliation, Ar   metoprolol succinate (TOPROL XL) 200 MG extended release tablet Take 1 tablet by mouth daily 10/27/22   Automatic Reconciliation, Ar   spironolactone (ALDACTONE) 25 MG tablet Take 1 tablet by mouth 2 times daily  Patient not taking: Reported on 2024 10/27/22   Automatic Reconciliation, Ar       Current medications:    Current Facility-Administered Medications   Medication Dose Route Frequency Provider Last Rate Last Admin   • lidocaine PF 1 % injection 1 mL  1 mL IntraDERmal Once PRN Vonda Daniel APRN - CRNA       • lactated ringers IV soln infusion   IntraVENous Continuous Vonda Daniel APRN - CRNA 125 mL/hr at 24 0942 New Bag at 24 0942   • sodium chloride flush 0.9 % injection 5-40 mL  5-40 mL IntraVENous 2 times per day Vonda Daniel APRN - CRNA       • 0.9 % sodium chloride infusion   IntraVENous PRN Vahid Cortez MD       • atorvastatin

## 2024-01-19 LAB
ALBUMIN SERPL-MCNC: 2.7 G/DL (ref 3.4–5)
ALBUMIN/GLOB SERPL: 0.8 (ref 0.8–1.7)
ALP SERPL-CCNC: 84 U/L (ref 45–117)
ALT SERPL-CCNC: 12 U/L (ref 16–61)
ANION GAP SERPL CALC-SCNC: 6 MMOL/L (ref 3–18)
AST SERPL-CCNC: 27 U/L (ref 10–38)
BILIRUB SERPL-MCNC: 1 MG/DL (ref 0.2–1)
BUN SERPL-MCNC: 4 MG/DL (ref 7–18)
BUN/CREAT SERPL: 7 (ref 12–20)
CALCIUM SERPL-MCNC: 8.1 MG/DL (ref 8.5–10.1)
CHLORIDE SERPL-SCNC: 108 MMOL/L (ref 100–111)
CO2 SERPL-SCNC: 25 MMOL/L (ref 21–32)
CREAT SERPL-MCNC: 0.59 MG/DL (ref 0.6–1.3)
GLOBULIN SER CALC-MCNC: 3.3 G/DL (ref 2–4)
GLUCOSE SERPL-MCNC: 75 MG/DL (ref 74–99)
HCT VFR BLD AUTO: 21.9 % (ref 36–48)
HCT VFR BLD AUTO: 24.4 % (ref 36–48)
HCT VFR BLD AUTO: 25.2 % (ref 36–48)
HGB BLD-MCNC: 6.4 G/DL (ref 13–16)
HGB BLD-MCNC: 7.3 G/DL (ref 13–16)
HGB BLD-MCNC: 7.7 G/DL (ref 13–16)
HISTORY CHECK: NORMAL
POTASSIUM SERPL-SCNC: 3 MMOL/L (ref 3.5–5.5)
PROT SERPL-MCNC: 6 G/DL (ref 6.4–8.2)
SODIUM SERPL-SCNC: 139 MMOL/L (ref 136–145)

## 2024-01-19 PROCEDURE — C9113 INJ PANTOPRAZOLE SODIUM, VIA: HCPCS | Performed by: INTERNAL MEDICINE

## 2024-01-19 PROCEDURE — P9016 RBC LEUKOCYTES REDUCED: HCPCS

## 2024-01-19 PROCEDURE — 6370000000 HC RX 637 (ALT 250 FOR IP): Performed by: HOSPITALIST

## 2024-01-19 PROCEDURE — 85014 HEMATOCRIT: CPT

## 2024-01-19 PROCEDURE — 2580000003 HC RX 258: Performed by: INTERNAL MEDICINE

## 2024-01-19 PROCEDURE — 30233N1 TRANSFUSION OF NONAUTOLOGOUS RED BLOOD CELLS INTO PERIPHERAL VEIN, PERCUTANEOUS APPROACH: ICD-10-PCS | Performed by: INTERNAL MEDICINE

## 2024-01-19 PROCEDURE — 99232 SBSQ HOSP IP/OBS MODERATE 35: CPT | Performed by: INTERNAL MEDICINE

## 2024-01-19 PROCEDURE — 94761 N-INVAS EAR/PLS OXIMETRY MLT: CPT

## 2024-01-19 PROCEDURE — A4216 STERILE WATER/SALINE, 10 ML: HCPCS | Performed by: INTERNAL MEDICINE

## 2024-01-19 PROCEDURE — 36415 COLL VENOUS BLD VENIPUNCTURE: CPT

## 2024-01-19 PROCEDURE — 85018 HEMOGLOBIN: CPT

## 2024-01-19 PROCEDURE — 1100000003 HC PRIVATE W/ TELEMETRY

## 2024-01-19 PROCEDURE — P9040 RBC LEUKOREDUCED IRRADIATED: HCPCS

## 2024-01-19 PROCEDURE — 6360000002 HC RX W HCPCS: Performed by: INTERNAL MEDICINE

## 2024-01-19 PROCEDURE — 6370000000 HC RX 637 (ALT 250 FOR IP): Performed by: INTERNAL MEDICINE

## 2024-01-19 PROCEDURE — 80053 COMPREHEN METABOLIC PANEL: CPT

## 2024-01-19 PROCEDURE — 2580000003 HC RX 258: Performed by: HOSPITALIST

## 2024-01-19 PROCEDURE — 36430 TRANSFUSION BLD/BLD COMPNT: CPT

## 2024-01-19 RX ORDER — CALCIUM POLYCARBOPHIL 625 MG 625 MG/1
625 TABLET ORAL DAILY
Status: DISCONTINUED | OUTPATIENT
Start: 2024-01-19 | End: 2024-01-19 | Stop reason: CLARIF

## 2024-01-19 RX ORDER — SODIUM CHLORIDE 9 MG/ML
INJECTION, SOLUTION INTRAVENOUS PRN
Status: DISCONTINUED | OUTPATIENT
Start: 2024-01-19 | End: 2024-01-19

## 2024-01-19 RX ADMIN — PSYLLIUM HUSK 1 PACKET: 3.4 POWDER ORAL at 17:19

## 2024-01-19 RX ADMIN — IRON SUCROSE 200 MG: 20 INJECTION, SOLUTION INTRAVENOUS at 10:00

## 2024-01-19 RX ADMIN — ATORVASTATIN CALCIUM 40 MG: 40 TABLET, FILM COATED ORAL at 08:48

## 2024-01-19 RX ADMIN — AMLODIPINE BESYLATE 10 MG: 10 TABLET ORAL at 08:47

## 2024-01-19 RX ADMIN — PANTOPRAZOLE SODIUM 40 MG: 40 INJECTION, POWDER, FOR SOLUTION INTRAVENOUS at 08:47

## 2024-01-19 RX ADMIN — POTASSIUM BICARBONATE 20 MEQ: 782 TABLET, EFFERVESCENT ORAL at 17:19

## 2024-01-19 RX ADMIN — SODIUM CHLORIDE, PRESERVATIVE FREE 10 ML: 5 INJECTION INTRAVENOUS at 08:53

## 2024-01-19 RX ADMIN — POTASSIUM BICARBONATE 40 MEQ: 782 TABLET, EFFERVESCENT ORAL at 15:00

## 2024-01-19 RX ADMIN — SODIUM CHLORIDE, PRESERVATIVE FREE 10 ML: 5 INJECTION INTRAVENOUS at 20:38

## 2024-01-19 RX ADMIN — ISOSORBIDE MONONITRATE 30 MG: 30 TABLET, EXTENDED RELEASE ORAL at 08:47

## 2024-01-19 RX ADMIN — METOPROLOL SUCCINATE 200 MG: 100 TABLET, EXTENDED RELEASE ORAL at 08:47

## 2024-01-19 ASSESSMENT — PAIN SCALES - GENERAL: PAINLEVEL_OUTOF10: 0

## 2024-01-19 NOTE — PROGRESS NOTES
0700: Bedside and Verbal shift change report given to NICHELLE Nichols (oncoming nurse) by NICHELLE Guy (offgoing nurse). Report included the following information Nurse Handoff Report, Index, Adult Overview, Intake/Output, MAR, Recent Results, and Cardiac Rhythm NSR .     0745: Phlebotomy called regarding recent blood draw not completed. Phlebotomy will come to draw an updated H/H.    Morning assessment and medications completed Pt with no complaints of pain or discomfort at this time.    1100: Pt's sister, Susan, at bedside.     1115: Blood transfusion started at 60ml/hr,     1130: Pt with no reaction to infusion, blood transfusion increased to 150ml/hr.     1500: Pt's potassium was replaced with 40 mEq.     1700: Phlebotomy at bedside.     1915: Bedside and Verbal shift change report given to NICHELLE Beasley (oncoming nurse) by NICHELLE Nichols (offgoing nurse). Report included the following information Nurse Handoff Report, Index, Adult Overview, Intake/Output, MAR, Recent Results, and Cardiac Rhythm NSR .

## 2024-01-19 NOTE — PROGRESS NOTES
[facility]  Progress Note    Patient: Jonathon Zee Age: 73 y.o. : 1950 MR#: [id number] SSN: [id number]  Date/Time: 2024     Subjective:   Patient doing well.  Hemoglobin was 6.4 today and patient to receive 1 unit of packed red blood cells.  No visualized bleeding by patient or by nursing.  Patient receiving IV iron as well.  Once patient's hemoglobin is stable he will be able to discharge.    Review of systems  General: No fevers or chills.  Cardiovascular: No chest pain or pressure. No palpitations.   Pulmonary: No shortness of breath, cough or wheeze.   Gastrointestinal: No abdominal pain, nausea, vomiting or diarrhea.   Genitourinary: No urinary frequency, urgency, hesitancy or dysuria.   Musculoskeletal: No joint or muscle pain, no back pain, no recent trauma.    Neurologic: No headache, numbness, tingling or weakness.   Assessment/Plan:   Severe anemia  LGIB, AVM's likely the source  Hypokalemia  HTN  CAD  Chronic alcohol use  Tobacco abuse     Admitted to 3n  Cardiac monitoring  Hgb Q8hrs  Gastric biopsies pending  Colonoscopy revealed AVM's likely source of bleeding  PPI IV BID  GI following  Imdur  Metoprolol  Norvasc for HTN  Replace K and mag as needed  Patient reports quit alcohol years ago  Nicotine patch  Daily fiber     I spent 40 minutes with the patient in face-to-face consultation, of which greater than 50% was spent in counseling and coordination of care as described above.     Case discussed with:  [x]Patient  []Family  []Nursing  []Case Management  DVT Prophylaxis:  []Lovenox  []Hep SQ  [x]SCDs  []Coumadin   []Eliquis/Xarelto     Objective:   VS: BP (!) 136/57   Pulse 90   Temp 98.4 °F (36.9 °C)   Resp 18   Ht 1.803 m (5' 11\")   Wt 76.2 kg (168 lb)   SpO2 97%   BMI 23.43 kg/m²    Tmax/24hrs: Temp (24hrs), Av.5 °F (36.9 °C), Min:97.8 °F (36.6 °C), Max:99 °F (37.2 °C)  IOBRIEF  Intake/Output Summary (Last 24 hours) at 2024

## 2024-01-19 NOTE — PROGRESS NOTES
Comprehensive Nutrition Assessment    Type and Reason for Visit:  Initial, Positive Nutrition Screen    Nutrition Recommendations/Plan:   Continue current diet, advance as medically appropriate.   Plan to add oral nutrition supplement to optimize nutrition intake opportunity: Ensure Plus High Protein (each provides 350 kcal, 20g protein) TID  Continue to monitor PO intake/tolerance, weight, labs, and POC while admitted.      Malnutrition Assessment:  Malnutrition Status:  Insufficient data (01/19/24 0979)    Context:  Acute Illness       Nutrition History and Allergies:   PMHx: CAD s/p cardiac catheterization, h/o alcohol use (no recent use at this time), HTN, HLD. Wt hx: c wt- 168 lb (stated), 173 lb (5/10/23, -2.9%), 174 lb (12/23/22, -3.4%), no significant wt change documented x >1 year PTA with current stated wt. NKFA     Nutrition Assessment:    Pt admitted for management of severe anemia with hypokalemia.   MST noted: wt loss (14-23 lb) and NO decreased appetite. S/p EGD with biopsies (normal) and colonoscopy with polypectomy 1/18- colonoscopy revealed AVM's likely source of bleeding, cauterized.  Diet advanced to full liquids currently. No PO data. Writer is working remotely- pt did not answer phone call, unable to discuss nutrition and weight hx. Will re-attempt at follow up. Plan to add nutrition supplements to increase calorie/protein intake.     Nutrition Related Findings:    Last BM: not documented. Edema: none. Labs: K 3 L (trend down), BUN/Cr 4/0.59 L, Ca 8.1 L. Pertinent meds: iron sucrose, protonix. Wound Type: None       Current Nutrition Intake & Therapies:    Average Meal Intake: Unable to assess  Average Supplements Intake: None Ordered  ADULT DIET; Full Liquid    Anthropometric Measures:  Height: 180.3 cm (5' 11\")  Ideal Body Weight (IBW): 172 lbs (78 kg)       Current Body Weight: 76.2 kg (168 lb), 97.7 % IBW. Weight Source: Stated  Current BMI (kg/m2): 23.4  Usual Body Weight: 78.5 kg (173 lb)  (5/10/23)  % Weight Change (Calculated): -2.9  Weight Adjustment For: No Adjustment  BMI Categories: Normal Weight (BMI 22.0 to 24.9) age over 65    Estimated Daily Nutrient Needs:  Energy Requirements Based On: Formula  Weight Used for Energy Requirements: Current  Energy (kcal/day): 9517-8419 (MSJ x 1.2-1.4)  Weight Used for Protein Requirements: Current  Protein (g/day): 76 - 91 (1-1.2)  Method Used for Fluid Requirements: 1 ml/kcal  Fluid (ml/day): 8021-5748    Nutrition Diagnosis:   Predicted inadequate energy intake related to increase demand for energy/nutrients, altered GI function (GI bleed, s/p EGD and colonoscopy with polypectomy) as evidenced by  (full liquid diet, reports of weight loss PTA)    Nutrition Interventions:   Food and/or Nutrient Delivery: Continue Current Diet, Start Oral Nutrition Supplement  Nutrition Education/Counseling: No recommendation at this time  Coordination of Nutrition Care: Continue to monitor while inpatient       Goals:     Goals: Meet at least 75% of estimated needs, by next RD assessment       Nutrition Monitoring and Evaluation:      Food/Nutrient Intake Outcomes: Diet Advancement/Tolerance, Food and Nutrient Intake, Supplement Intake  Physical Signs/Symptoms Outcomes: Biochemical Data, GI Status, Fluid Status or Edema, Hemodynamic Status, Meal Time Behavior, Weight    Discharge Planning:    Too soon to determine     Sherine Hunter RD  Contact: [phone]

## 2024-01-19 NOTE — PROGRESS NOTES
Metamucil packet was therapeutically interchanged for FiberCon Tablet per the P &T Committee approved Therapeutic Interchanges Policy.

## 2024-01-19 NOTE — PROGRESS NOTES
normocephalic and atraumatic  Abdomen: soft, non-tender, non-distended, normal bowel sounds, no masses or organomegaly  Musculoskeletal: normal range of motion, no joint swelling, deformity or tenderness  Neurologic: speech normal      No intake or output data in the 24 hours ending 01/19/24 1118    CBC w/Diff    Lab Results   Component Value Date/Time    WBC 8.0 01/16/2024 08:10 PM    RBC 2.96 (L) 01/16/2024 08:10 PM    HGB 7.3 (L) 01/19/2024 08:38 AM    HCT 24.4 (L) 01/19/2024 08:38 AM    MCV 71.3 (L) 01/16/2024 08:10 PM    MCH 19.6 (L) 01/16/2024 08:10 PM    MCHC 27.5 (L) 01/16/2024 08:10 PM    RDW 17.4 (H) 01/16/2024 08:10 PM     01/16/2024 08:10 PM    MPV 13.0 (H) 02/18/2022 03:51 PM    No results found for: \"MONO\", \"BASO\", \"BANDS\", \"METAS\", \"PRO\", \"BLAST\"   Basic Metabolic Profile   Recent Labs     01/19/24  0021      K 3.0*      CO2 25   BUN 4*   GLUCOSE 75        Hepatic Function    Lab Results   Component Value Date/Time    ALT 12 01/19/2024 12:21 AM    No components found for: \"SGOT\", \"GPT\", \"DBILI\", \"TBIL\"       Coags   Recent Labs     01/16/24 2010   INR 1.1   APTT 28.6         Seda Villarreal NP-C    [facility]  [email or web address]  Phone: [phone]

## 2024-01-19 NOTE — PROGRESS NOTES
[facility]ist [facility]  Progress Note    Patient: Jonathon Zee Age: 73 y.o. : 1950 MR#: [id number] SSN: [id number]  Date/Time: 2024     Subjective:   Patient stable. Hemoglobin low. No noted bleeding today. Colonoscopy noted AVM in ascending colon and cecum that were cauterized. EGD normal.    Review of systems  General: No fevers or chills.  Cardiovascular: No chest pain or pressure. No palpitations.   Pulmonary: No shortness of breath, cough or wheeze.   Gastrointestinal: No abdominal pain, nausea, vomiting or diarrhea.   Genitourinary: No urinary frequency, urgency, hesitancy or dysuria.   Musculoskeletal: No joint or muscle pain, no back pain, no recent trauma.    Neurologic: No headache, numbness, tingling or weakness.   Assessment/Plan:   Severe anemia  LGIB, AVM's likely the source  Hypokalemia  HTN  CAD  Chronic alcohol use  Tobacco abuse    Admitted to 3n  Cardiac monitoring  Hgb Q8hrs  Colonoscopy revealed AVM's likely source of bleeding  PPI IV BID  GI following  Imdur  Metoprolol  Norvasc for HTN  Replace K and mag as needed  Patient reports quit alcohol years ago  Nicotine patchj    I spent 40 minutes with the patient in face-to-face consultation, of which greater than 50% was spent in counseling and coordination of care as described above.    Case discussed with:  [x]Patient  []Family  []Nursing  []Case Management  DVT Prophylaxis:  []Lovenox  []Hep SQ  [x]SCDs  []Coumadin   []Eliquis/Xarelto     Objective:   VS: BP (!) 153/78   Pulse 87   Temp 98.6 °F (37 °C) (Oral)   Resp 17   Ht 1.803 m (5' 11\")   Wt 76.2 kg (168 lb)   SpO2 98%   BMI 23.43 kg/m²    Tmax/24hrs: Temp (24hrs), Av.3 °F (36.8 °C), Min:97.5 °F (36.4 °C), Max:98.7 °F (37.1 °C)  IOBRIEFNo intake or output data in the 24 hours ending 24 743    General:  Alert, cooperative, no acute distress    HEENT: PERRLA, anicteric sclerae.  Pulmonary:  CTA Bilaterally. No  7:08 PM   Result Value Ref Range    Hemoglobin 7.3 (L) 13.0 - 16.0 g/dL    Hematocrit 24.7 (L) 36.0 - 48.0 %       Signed By: Abdifatah Brown DO     January 18, 2024      Disclaimer: Sections of this note are dictated using utilizing voice recognition software.  Minor typographical errors may be present. If questions arise, please do not hesitate to contact me or call our department.

## 2024-01-19 NOTE — PLAN OF CARE
Problem: Pain  Goal: Verbalizes/displays adequate comfort level or baseline comfort level  Outcome: Progressing     Problem: Safety - Adult  Goal: Free from fall injury  Outcome: Progressing     Problem: Skin/Tissue Integrity  Goal: Absence of new skin breakdown  Description: 1.  Monitor for areas of redness and/or skin breakdown  2.  Assess vascular access sites hourly  3.  Every 4-6 hours minimum:  Change oxygen saturation probe site  4.  Every 4-6 hours:  If on nasal continuous positive airway pressure, respiratory therapy assess nares and determine need for appliance change or resting period.  Outcome: Progressing     Problem: Pain  Goal: Verbalizes/displays adequate comfort level or baseline comfort level  Outcome: Progressing     Problem: Safety - Adult  Goal: Free from fall injury  Outcome: Progressing     Problem: Skin/Tissue Integrity  Goal: Absence of new skin breakdown  Description: 1.  Monitor for areas of redness and/or skin breakdown  2.  Assess vascular access sites hourly  3.  Every 4-6 hours minimum:  Change oxygen saturation probe site  4.  Every 4-6 hours:  If on nasal continuous positive airway pressure, respiratory therapy assess nares and determine need for appliance change or resting period.  Outcome: Progressing

## 2024-01-20 VITALS
OXYGEN SATURATION: 99 % | HEART RATE: 77 BPM | TEMPERATURE: 99.2 F | DIASTOLIC BLOOD PRESSURE: 63 MMHG | WEIGHT: 168 LBS | HEIGHT: 71 IN | RESPIRATION RATE: 18 BRPM | BODY MASS INDEX: 23.52 KG/M2 | SYSTOLIC BLOOD PRESSURE: 111 MMHG

## 2024-01-20 LAB
ABO + RH BLD: NORMAL
ALBUMIN SERPL-MCNC: 2.6 G/DL (ref 3.4–5)
ALBUMIN/GLOB SERPL: 0.8 (ref 0.8–1.7)
ALP SERPL-CCNC: 82 U/L (ref 45–117)
ALT SERPL-CCNC: 11 U/L (ref 16–61)
ANION GAP SERPL CALC-SCNC: 5 MMOL/L (ref 3–18)
AST SERPL-CCNC: 29 U/L (ref 10–38)
BILIRUB SERPL-MCNC: 2.1 MG/DL (ref 0.2–1)
BLD PROD TYP BPU: NORMAL
BLOOD BANK DISPENSE STATUS: NORMAL
BLOOD GROUP ANTIBODIES SERPL: NORMAL
BPU ID: NORMAL
BUN SERPL-MCNC: 5 MG/DL (ref 7–18)
BUN/CREAT SERPL: 9 (ref 12–20)
CALCIUM SERPL-MCNC: 8.3 MG/DL (ref 8.5–10.1)
CALLED TO: NORMAL
CALLED TO:,BCALL3: NORMAL
CALLED TO:: NORMAL
CHLORIDE SERPL-SCNC: 110 MMOL/L (ref 100–111)
CO2 SERPL-SCNC: 25 MMOL/L (ref 21–32)
CREAT SERPL-MCNC: 0.57 MG/DL (ref 0.6–1.3)
CROSSMATCH RESULT: NORMAL
GLOBULIN SER CALC-MCNC: 3.3 G/DL (ref 2–4)
GLUCOSE SERPL-MCNC: 102 MG/DL (ref 74–99)
HCT VFR BLD AUTO: 24.1 % (ref 36–48)
HCT VFR BLD AUTO: 25.6 % (ref 36–48)
HGB BLD-MCNC: 7.3 G/DL (ref 13–16)
HGB BLD-MCNC: 7.8 G/DL (ref 13–16)
POTASSIUM SERPL-SCNC: 3.2 MMOL/L (ref 3.5–5.5)
PROT SERPL-MCNC: 5.9 G/DL (ref 6.4–8.2)
SODIUM SERPL-SCNC: 140 MMOL/L (ref 136–145)
SPECIMEN EXP DATE BLD: NORMAL
UNIT DIVISION: 0

## 2024-01-20 PROCEDURE — 6370000000 HC RX 637 (ALT 250 FOR IP): Performed by: HOSPITALIST

## 2024-01-20 PROCEDURE — 36415 COLL VENOUS BLD VENIPUNCTURE: CPT

## 2024-01-20 PROCEDURE — 6360000002 HC RX W HCPCS: Performed by: INTERNAL MEDICINE

## 2024-01-20 PROCEDURE — 80053 COMPREHEN METABOLIC PANEL: CPT

## 2024-01-20 PROCEDURE — 94761 N-INVAS EAR/PLS OXIMETRY MLT: CPT

## 2024-01-20 PROCEDURE — 2580000003 HC RX 258: Performed by: HOSPITALIST

## 2024-01-20 PROCEDURE — 85018 HEMOGLOBIN: CPT

## 2024-01-20 PROCEDURE — 2580000003 HC RX 258: Performed by: INTERNAL MEDICINE

## 2024-01-20 PROCEDURE — A4216 STERILE WATER/SALINE, 10 ML: HCPCS | Performed by: INTERNAL MEDICINE

## 2024-01-20 PROCEDURE — 6370000000 HC RX 637 (ALT 250 FOR IP): Performed by: INTERNAL MEDICINE

## 2024-01-20 PROCEDURE — C9113 INJ PANTOPRAZOLE SODIUM, VIA: HCPCS | Performed by: INTERNAL MEDICINE

## 2024-01-20 PROCEDURE — 85014 HEMATOCRIT: CPT

## 2024-01-20 RX ORDER — ASPIRIN 81 MG/1
81 TABLET ORAL DAILY
Qty: 90 TABLET | Refills: 1 | Status: SHIPPED | OUTPATIENT
Start: 2024-01-20

## 2024-01-20 RX ORDER — PANTOPRAZOLE SODIUM 40 MG/1
40 TABLET, DELAYED RELEASE ORAL
Qty: 90 TABLET | Refills: 1 | Status: SHIPPED | OUTPATIENT
Start: 2024-01-20

## 2024-01-20 RX ORDER — NICOTINE 21 MG/24HR
1 PATCH, TRANSDERMAL 24 HOURS TRANSDERMAL DAILY
Qty: 30 PATCH | Refills: 3 | Status: SHIPPED | OUTPATIENT
Start: 2024-01-21

## 2024-01-20 RX ADMIN — METOPROLOL SUCCINATE 200 MG: 100 TABLET, EXTENDED RELEASE ORAL at 09:23

## 2024-01-20 RX ADMIN — POTASSIUM BICARBONATE 40 MEQ: 782 TABLET, EFFERVESCENT ORAL at 07:51

## 2024-01-20 RX ADMIN — ISOSORBIDE MONONITRATE 30 MG: 30 TABLET, EXTENDED RELEASE ORAL at 09:24

## 2024-01-20 RX ADMIN — PSYLLIUM HUSK 1 PACKET: 3.4 POWDER ORAL at 09:22

## 2024-01-20 RX ADMIN — AMLODIPINE BESYLATE 10 MG: 10 TABLET ORAL at 09:22

## 2024-01-20 RX ADMIN — ATORVASTATIN CALCIUM 40 MG: 40 TABLET, FILM COATED ORAL at 09:23

## 2024-01-20 RX ADMIN — PANTOPRAZOLE SODIUM 40 MG: 40 INJECTION, POWDER, FOR SOLUTION INTRAVENOUS at 09:22

## 2024-01-20 RX ADMIN — SODIUM CHLORIDE, PRESERVATIVE FREE 10 ML: 5 INJECTION INTRAVENOUS at 09:23

## 2024-01-20 ASSESSMENT — PAIN SCALES - GENERAL
PAINLEVEL_OUTOF10: 0

## 2024-01-20 NOTE — PLAN OF CARE
Problem: Discharge Planning  Goal: Discharge to home or other facility with appropriate resources  Outcome: Progressing  Flowsheets (Taken 1/19/2024 1945)  Discharge to home or other facility with appropriate resources:   Identify barriers to discharge with patient and caregiver   Identify discharge learning needs (meds, wound care, etc)     Problem: ABCDS Injury Assessment  Goal: Absence of physical injury  Outcome: Progressing     Problem: Pain  Goal: Verbalizes/displays adequate comfort level or baseline comfort level  Outcome: Progressing     Problem: Safety - Adult  Goal: Free from fall injury  Outcome: Progressing     Problem: Nutrition Deficit:  Goal: Optimize nutritional status  Outcome: Progressing     Problem: Skin/Tissue Integrity  Goal: Absence of new skin breakdown  Description: 1.  Monitor for areas of redness and/or skin breakdown  2.  Assess vascular access sites hourly  3.  Every 4-6 hours minimum:  Change oxygen saturation probe site  4.  Every 4-6 hours:  If on nasal continuous positive airway pressure, respiratory therapy assess nares and determine need for appliance change or resting period.  Outcome: Progressing

## 2024-01-20 NOTE — CARE COORDINATION
1/20/24    D/C order noted for today. Orders reviewed. No needs identified at this time. CM remains available if needed. Sister to provide ride home.    Alyssa Nascimento  Case Mangement

## 2024-01-20 NOTE — PLAN OF CARE
Problem: Discharge Planning  Goal: Discharge to home or other facility with appropriate resources  1/20/2024 1152 by Sylvia Price RN  Outcome: Progressing  1/20/2024 0821 by Gale Ayers RN  Outcome: Progressing  Flowsheets (Taken 1/19/2024 1945)  Discharge to home or other facility with appropriate resources:   Identify barriers to discharge with patient and caregiver   Identify discharge learning needs (meds, wound care, etc)     Problem: ABCDS Injury Assessment  Goal: Absence of physical injury  1/20/2024 1152 by Sylvia Price RN  Outcome: Progressing  1/20/2024 0821 by Gale Ayers RN  Outcome: Progressing     Problem: Pain  Goal: Verbalizes/displays adequate comfort level or baseline comfort level  1/20/2024 1152 by Sylvia Price RN  Outcome: Progressing  1/20/2024 0821 by Gale Ayers RN  Outcome: Progressing     Problem: Safety - Adult  Goal: Free from fall injury  1/20/2024 1152 by Sylvia Price RN  Outcome: Progressing  1/20/2024 0821 by Gale Ayers RN  Outcome: Progressing     Problem: Nutrition Deficit:  Goal: Optimize nutritional status  1/20/2024 1152 by Sylvia Price RN  Outcome: Progressing  1/20/2024 0821 by Gale Ayers RN  Outcome: Progressing     Problem: Skin/Tissue Integrity  Goal: Absence of new skin breakdown  Description: 1.  Monitor for areas of redness and/or skin breakdown  2.  Assess vascular access sites hourly  3.  Every 4-6 hours minimum:  Change oxygen saturation probe site  4.  Every 4-6 hours:  If on nasal continuous positive airway pressure, respiratory therapy assess nares and determine need for appliance change or resting period.  1/20/2024 1152 by Sylvia Price RN  Outcome: Progressing  1/20/2024 0821 by Gale Ayers RN  Outcome: Progressing

## 2024-01-20 NOTE — PROGRESS NOTES
07:25 Bedside and Verbal shift change report given to Sylvia Price RN (oncoming nurse) by NICHELLE Beasley (offgoing nurse). Report included the following information Nurse Handoff Report, Index, Intake/Output, MAR, Cardiac Rhythm NSR, Alarm Parameters, and Quality Measures.  Patient awake in bed, alert and oriented X4, no complaints of pain.    0922 Morning assessment, med administration      1200 Paged MD to update discharge summary      1345   Reviewed discharge instructions with patient, patient verbalized understanding, all questions answered. Family at bedside.    1400 Patient discharged home at this time. This RN escorted patient to main entrance via wheelchair, patient left building with family.

## 2024-01-20 NOTE — PLAN OF CARE
Problem: Discharge Planning  Goal: Discharge to home or other facility with appropriate resources  1/20/2024 1226 by Sylvia Price RN  Outcome: Progressing  1/20/2024 1152 by Sylvia Price RN  Outcome: Progressing  Flowsheets (Taken 1/20/2024 0915)  Discharge to home or other facility with appropriate resources: Identify barriers to discharge with patient and caregiver  1/20/2024 0821 by Gale Ayers RN  Outcome: Progressing  Flowsheets (Taken 1/19/2024 1945)  Discharge to home or other facility with appropriate resources:   Identify barriers to discharge with patient and caregiver   Identify discharge learning needs (meds, wound care, etc)     Problem: ABCDS Injury Assessment  Goal: Absence of physical injury  1/20/2024 1226 by Sylvia Price RN  Outcome: Progressing  1/20/2024 1152 by Sylvia Price RN  Outcome: Progressing  1/20/2024 0821 by Gale Ayers RN  Outcome: Progressing     Problem: Pain  Goal: Verbalizes/displays adequate comfort level or baseline comfort level  1/20/2024 1226 by Sylvia Price RN  Outcome: Progressing  1/20/2024 1152 by Sylvia Price RN  Outcome: Progressing  1/20/2024 0821 by Gale Ayers RN  Outcome: Progressing     Problem: Safety - Adult  Goal: Free from fall injury  1/20/2024 1226 by Sylvia Price RN  Outcome: Progressing  1/20/2024 1152 by Sylvia Price RN  Outcome: Progressing  1/20/2024 0821 by Gale Ayers RN  Outcome: Progressing     Problem: Nutrition Deficit:  Goal: Optimize nutritional status  1/20/2024 1226 by Sylvia Price RN  Outcome: Progressing  1/20/2024 1152 by Sylvia Price RN  Outcome: Progressing  1/20/2024 0821 by Gale Ayers RN  Outcome: Progressing     Problem: Skin/Tissue Integrity  Goal: Absence of new skin breakdown  Description: 1.  Monitor for areas of redness and/or skin breakdown  2.  Assess vascular access sites hourly  3.  Every 4-6 hours minimum:  Change oxygen saturation probe site  4.  Every 4-6 hours:

## 2024-01-20 NOTE — PROGRESS NOTES
1930: Bedside and Verbal shift change report given to NICHELLE Beasley (oncoming nurse) by NICHELLE Nichols (offgoing nurse). Report included the following information Nurse Handoff Report, Index, Intake/Output, MAR, Cardiac Rhythm NSR, and Quality Measures.

## 2024-01-22 ENCOUNTER — CARE COORDINATION (OUTPATIENT)
Facility: CLINIC | Age: 74
End: 2024-01-22

## 2024-01-22 NOTE — CARE COORDINATION
Care Transitions Initial Follow Up Call    Call within 2 business days of discharge: Yes    Patient Current Location:  [place]    Care Transition Nurse contacted the patient by telephone to perform post hospital discharge assessment. Verified name and  with patient as identifiers. Provided introduction to self, and explanation of the Care Transition Nurse role.     Patient: Jonathon Zee Patient : 1950   MRN: [id number]      Reason for Admission, per chart review  -  Anemia       Discharge Date: 24 RARS: Readmission Risk Score: 13.6      Last Discharge Facility       Date Complaint Diagnosis Description Type Department Provider    24 Abnormal Lab Anemia, unspecified type ... ED to Hosp-Admission (Discharged) (ADMITTED) [id number] Franco, DO; GOPAL Ruvalcaba..            Was this an external facility discharge? No   Discharge Facility: [facility]     Challenges to be reviewed by the provider   Additional needs identified to be addressed with provider: No  none               Method of communication with provider: none.    Patient reports:   - He is feeling a little better   - He does not have any needs / need for CTN assistance at this time.     Care Transition Nurse reviewed discharge instructions, medical action plan, and red flags with patient who verbalized understanding. The patient was given an opportunity to ask questions and does not have any further questions or concerns at this time. Were discharge instructions available to patient? Yes. Reviewed appropriate site of care based on symptoms and resources available to patient including:   PCP  After hours contact number-Medical Provider office phone number.   When to call 911. The patient agrees to contact the PCP office for questions related to their healthcare.     Advance Care Planning:   Does patient have an Advance Directive:  Not noted  to be on file at this time.     Review of Medications  Medication reconciliation

## 2024-01-23 ENCOUNTER — OFFICE VISIT (OUTPATIENT)
Facility: CLINIC | Age: 74
End: 2024-01-23

## 2024-01-23 VITALS
RESPIRATION RATE: 16 BRPM | TEMPERATURE: 98.4 F | HEIGHT: 71 IN | DIASTOLIC BLOOD PRESSURE: 60 MMHG | SYSTOLIC BLOOD PRESSURE: 135 MMHG | HEART RATE: 75 BPM | BODY MASS INDEX: 23.24 KG/M2 | OXYGEN SATURATION: 98 % | WEIGHT: 166 LBS

## 2024-01-23 DIAGNOSIS — D50.9 MICROCYTIC ANEMIA: Primary | ICD-10-CM

## 2024-01-23 DIAGNOSIS — Z87.19 H/O: UPPER GI BLEED: ICD-10-CM

## 2024-01-23 DIAGNOSIS — F10.21 ALCOHOL DEPENDENCE, IN REMISSION (HCC): ICD-10-CM

## 2024-01-23 RX ORDER — ISOSORBIDE MONONITRATE 30 MG/1
30 TABLET, EXTENDED RELEASE ORAL DAILY
Qty: 30 TABLET | Status: CANCELLED | OUTPATIENT
Start: 2024-01-23

## 2024-01-23 RX ORDER — FERROUS SULFATE 325(65) MG
325 TABLET ORAL
Qty: 90 TABLET | Refills: 1 | Status: SHIPPED | OUTPATIENT
Start: 2024-01-23

## 2024-01-23 SDOH — ECONOMIC STABILITY: INCOME INSECURITY: HOW HARD IS IT FOR YOU TO PAY FOR THE VERY BASICS LIKE FOOD, HOUSING, MEDICAL CARE, AND HEATING?: NOT HARD AT ALL

## 2024-01-23 SDOH — ECONOMIC STABILITY: FOOD INSECURITY: WITHIN THE PAST 12 MONTHS, THE FOOD YOU BOUGHT JUST DIDN'T LAST AND YOU DIDN'T HAVE MONEY TO GET MORE.: NEVER TRUE

## 2024-01-23 SDOH — ECONOMIC STABILITY: FOOD INSECURITY: WITHIN THE PAST 12 MONTHS, YOU WORRIED THAT YOUR FOOD WOULD RUN OUT BEFORE YOU GOT MONEY TO BUY MORE.: NEVER TRUE

## 2024-01-23 ASSESSMENT — ANXIETY QUESTIONNAIRES
7. FEELING AFRAID AS IF SOMETHING AWFUL MIGHT HAPPEN: 0
1. FEELING NERVOUS, ANXIOUS, OR ON EDGE: 0
5. BEING SO RESTLESS THAT IT IS HARD TO SIT STILL: 0
IF YOU CHECKED OFF ANY PROBLEMS ON THIS QUESTIONNAIRE, HOW DIFFICULT HAVE THESE PROBLEMS MADE IT FOR YOU TO DO YOUR WORK, TAKE CARE OF THINGS AT HOME, OR GET ALONG WITH OTHER PEOPLE: NOT DIFFICULT AT ALL
4. TROUBLE RELAXING: 0
2. NOT BEING ABLE TO STOP OR CONTROL WORRYING: 0
6. BECOMING EASILY ANNOYED OR IRRITABLE: 0
3. WORRYING TOO MUCH ABOUT DIFFERENT THINGS: 0
GAD7 TOTAL SCORE: 0

## 2024-01-23 ASSESSMENT — PATIENT HEALTH QUESTIONNAIRE - PHQ9
SUM OF ALL RESPONSES TO PHQ QUESTIONS 1-9: 0
2. FEELING DOWN, DEPRESSED OR HOPELESS: 0
1. LITTLE INTEREST OR PLEASURE IN DOING THINGS: 0
SUM OF ALL RESPONSES TO PHQ QUESTIONS 1-9: 0
SUM OF ALL RESPONSES TO PHQ9 QUESTIONS 1 & 2: 0

## 2024-01-23 NOTE — PROGRESS NOTES
01/23/24  Assessment/Plan  1. Microcytic anemia  Comments:  Endoscopy and colonoscopy did not show bleeding lesions.  Anemia panel ordered.  Follow-up hemoglobin ordered.  On iron.  Orders:  -     Basic Metabolic Panel; Future  -     CBC with Auto Differential; Future  -     ferrous sulfate (IRON 325) 325 (65 Fe) MG tablet; Take 1 tablet by mouth daily (with breakfast), Disp-90 tablet, R-1Normal  -     Ferritin; Future  -     Vitamin B12 & Folate; Future  -     Iron and TIBC; Future  -     Reticulocytes; Future  2. H/O: upper GI bleed  Comments:  EGD reassuring will also check for any other causes of anemia.  Placed on iron.  3. Alcohol dependence, in remission (HCC)  Comments:  Denies any recent alcohol use.  None for several months at least.  Encouraged.     RESULTS REVIEW:   Admission on 01/16/2024, Discharged on 01/20/2024   Component Date Value    Ventricular Rate 01/16/2024 76     Atrial Rate 01/16/2024 76     P-R Interval 01/16/2024 200     QRS Duration 01/16/2024 134     Q-T Interval 01/16/2024 420     QTc Calculation (Bazett) 01/16/2024 472     P Axis 01/16/2024 63     R Malone 01/16/2024 -33     T Axis 01/16/2024 126     Diagnosis 01/16/2024                      Value:Sinus rhythm with occasional premature ventricular complexes  Left axis deviation  Left ventricular hypertrophy with QRS widening and repolarization abnormality  Cannot rule out Septal infarct (cited on or before 10-MAY-2023)  Abnormal ECG  When compared with ECG of 10-MAY-2023 10:56,  premature ventricular complexes are now present    Confirmed by GENEVIEVE Britton Dena ([id number]) on 1/17/2024 8:04:12 PM      WBC 01/16/2024 8.0     RBC 01/16/2024 2.96 (L)     Hemoglobin 01/16/2024 5.8 (LL)     Hematocrit 01/16/2024 21.1 (L)     MCV 01/16/2024 71.3 (L)     MCH 01/16/2024 19.6 (L)     MCHC 01/16/2024 27.5 (L)     RDW 01/16/2024 17.4 (H)     Platelets 01/16/2024 237     Nucleated RBCs 01/16/2024 0.0     nRBC 01/16/2024 0.00     Neutrophils %

## 2024-01-24 ENCOUNTER — CARE COORDINATION (OUTPATIENT)
Facility: CLINIC | Age: 74
End: 2024-01-24

## 2024-01-24 NOTE — PROGRESS NOTES
Physician Progress Note      PATIENT:               ANNIA LARIOS  CSN #:                  [id number]  :                       1950  ADMIT DATE:       2024 8:12 PM  DISCH DATE:        2024 2:29 PM  RESPONDING  PROVIDER #:        Abdifatah Brown DO          QUERY TEXT:    Pt admitted with melena and has anemia documented. If possible, please   document in progress notes and discharge summary further specificity regarding   the acuity and additional type of anemia:    The medical record reflects the following:  Risk Factors: history of chronic alcohol use  Clinical Indicators:  2024, H&P, Dr. Salomon Sargent MD:  \"routine labs done showed a hemoglobin   of 5.3.  Patient has been having shortness of breath with activity and for the   last 3 months has been having melena.\"  2023, Op note, Dr. Lukasz Fletcher MD:  \"Descending Colon: Mild to moderate   diverticulosis seen in the sigmoid, descending and transverse colon. 5 mm   sessile polyp seen in the descending colon-removed by cold snare polypectomy.  Ascending Colon: 8 mm AVM noted in the ascending colon with stigmata of recent   bleeding-cauterized with APC with standard settings with good effect.  Cecum: 6 AVMs seen in the cecum-range from 2 mm to 6 mm-the larger ones have   some recent stigmata of bleeding; cauterized with APC with standard settings   with good effect.\"  2024, PN, Dr. Abdifatah Brown, DO:  \"LGIB, AVM's likely the source.\"  H/H:  5.4/19.7 --> 5.8/21.1 on 2024 --> 7.4/25.1 on 2024  Treatment: EGD, colonoscopy with AVM requiring cauterization or removal by   cold snare    Thank you,  ALYSSIA Perez RN, CDS  [email or web address]  Options provided:  -- Anemia due to acute blood loss  -- Anemia due to chronic blood loss  -- Anemia due to acute on chronic blood loss  -- Other - I will add my own diagnosis  -- Disagree - Not applicable / Not valid  -- Disagree - Clinically unable to determine / Unknown  -- Refer to

## 2024-01-24 NOTE — CARE COORDINATION
Care Transitions Outreach Attempt      Attempted to reach patient for transitions of care follow up. Unable to reach patient.    Patient: Jonathon Zee Patient : 1950 MRN: [id number]    Last Discharge Facility       Date Complaint Diagnosis Description Type Department Provider    24 Abnormal Lab Anemia, unspecified type ... ED to Hosp-Admission (Discharged) (ADMITTED) [id number] Gutierrez, DO; GOPAL Ruvalcaba..                Noted following upcoming appointments from discharge chart review:   [facility] follow up appointment(s):   Future Appointments   Date Time Provider Department Center   2024 10:00 AM [facility] BS AMB   2024 10:20 AM Margie Bajwa MD [facility]  AMB   2024  9:20 AM Margie Bajwa MD [facility]  AMB   2024 10:00 AM Tarun Santillan MD [facility] AMB

## 2024-01-27 ASSESSMENT — ENCOUNTER SYMPTOMS
COUGH: 0
CHOKING: 0
DIARRHEA: 0
EYE REDNESS: 0
ABDOMINAL DISTENTION: 0
EYE PAIN: 0
WHEEZING: 0
CONSTIPATION: 0
CHEST TIGHTNESS: 0
BLOOD IN STOOL: 0
ANAL BLEEDING: 0
ABDOMINAL PAIN: 0
SHORTNESS OF BREATH: 0
STRIDOR: 0
COLOR CHANGE: 0

## 2024-01-29 ENCOUNTER — CARE COORDINATION (OUTPATIENT)
Facility: CLINIC | Age: 74
End: 2024-01-29

## 2024-01-29 ENCOUNTER — HOSPITAL ENCOUNTER (OUTPATIENT)
Facility: HOSPITAL | Age: 74
Setting detail: SPECIMEN
Discharge: HOME | End: 2024-02-01
Payer: MEDICARE

## 2024-01-29 DIAGNOSIS — D50.9 MICROCYTIC ANEMIA: ICD-10-CM

## 2024-01-29 LAB
ANION GAP SERPL CALC-SCNC: 5 MMOL/L (ref 3–18)
BASOPHILS # BLD: 0.1 K/UL (ref 0–0.1)
BASOPHILS NFR BLD: 2 % (ref 0–2)
BUN SERPL-MCNC: 11 MG/DL (ref 7–18)
BUN/CREAT SERPL: 17 (ref 12–20)
CALCIUM SERPL-MCNC: 9.3 MG/DL (ref 8.5–10.1)
CHLORIDE SERPL-SCNC: 107 MMOL/L (ref 100–111)
CO2 SERPL-SCNC: 27 MMOL/L (ref 21–32)
CREAT SERPL-MCNC: 0.65 MG/DL (ref 0.6–1.3)
DIFFERENTIAL METHOD BLD: ABNORMAL
EOSINOPHIL # BLD: 0.1 K/UL (ref 0–0.4)
EOSINOPHIL NFR BLD: 1 % (ref 0–5)
ERYTHROCYTE [DISTWIDTH] IN BLOOD BY AUTOMATED COUNT: 24.6 % (ref 11.6–14.5)
FERRITIN SERPL-MCNC: 136 NG/ML (ref 8–388)
FOLATE SERPL-MCNC: 5.2 NG/ML (ref 3.1–17.5)
GLUCOSE SERPL-MCNC: 88 MG/DL (ref 74–99)
HCT VFR BLD AUTO: 33.7 % (ref 36–48)
HGB BLD-MCNC: 9.9 G/DL (ref 13–16)
IMM GRANULOCYTES # BLD AUTO: 0 K/UL (ref 0–0.04)
IMM GRANULOCYTES NFR BLD AUTO: 0 % (ref 0–0.5)
IRON SATN MFR SERPL: 9 % (ref 20–50)
IRON SERPL-MCNC: 31 UG/DL (ref 50–175)
LYMPHOCYTES # BLD: 1 K/UL (ref 0.9–3.6)
LYMPHOCYTES NFR BLD: 19 % (ref 21–52)
MCH RBC QN AUTO: 23.7 PG (ref 24–34)
MCHC RBC AUTO-ENTMCNC: 29.4 G/DL (ref 31–37)
MCV RBC AUTO: 80.8 FL (ref 78–100)
MONOCYTES # BLD: 0.5 K/UL (ref 0.05–1.2)
MONOCYTES NFR BLD: 9 % (ref 3–10)
NEUTS SEG # BLD: 3.7 K/UL (ref 1.8–8)
NEUTS SEG NFR BLD: 69 % (ref 40–73)
NRBC # BLD: 0 K/UL (ref 0–0.01)
NRBC BLD-RTO: 0 PER 100 WBC
PLATELET # BLD AUTO: 274 K/UL (ref 135–420)
PLATELET COMMENT: ABNORMAL
PMV BLD AUTO: 12 FL (ref 9.2–11.8)
POTASSIUM SERPL-SCNC: 4 MMOL/L (ref 3.5–5.5)
RBC # BLD AUTO: 4.17 M/UL (ref 4.35–5.65)
RBC MORPH BLD: ABNORMAL
RETICS/RBC NFR AUTO: 1.2 % (ref 0.5–2.5)
SODIUM SERPL-SCNC: 139 MMOL/L (ref 136–145)
TIBC SERPL-MCNC: 342 UG/DL (ref 250–450)
VIT B12 SERPL-MCNC: 157 PG/ML (ref 211–911)
WBC # BLD AUTO: 5.4 K/UL (ref 4.6–13.2)

## 2024-01-29 PROCEDURE — 83550 IRON BINDING TEST: CPT

## 2024-01-29 PROCEDURE — 82728 ASSAY OF FERRITIN: CPT

## 2024-01-29 PROCEDURE — 85025 COMPLETE CBC W/AUTO DIFF WBC: CPT

## 2024-01-29 PROCEDURE — 36415 COLL VENOUS BLD VENIPUNCTURE: CPT

## 2024-01-29 PROCEDURE — 82607 VITAMIN B-12: CPT

## 2024-01-29 PROCEDURE — 85045 AUTOMATED RETICULOCYTE COUNT: CPT

## 2024-01-29 PROCEDURE — 80048 BASIC METABOLIC PNL TOTAL CA: CPT

## 2024-01-29 PROCEDURE — 82746 ASSAY OF FOLIC ACID SERUM: CPT

## 2024-01-29 PROCEDURE — 83540 ASSAY OF IRON: CPT

## 2024-01-29 NOTE — CARE COORDINATION
Care Transitions Follow Up Call    Patient Current Location:  [place]    Care Transition Nurse contacted the patient by telephone to follow up after admission on 24.  Verified name and  with patient as identifiers.    Patient: Jonathon Zee  Patient : 1950   MRN: [id number]  Discharge Date: 24 RARS: Readmission Risk Score: 13.6      Needs to be reviewed by the provider   Additional needs identified to be addressed with provider: No  none             Method of communication with provider: none.    Patient advises:   - He is feeling pretty good   - He received blood today   - He is in need of 1 medication and he will follow up with his medical provider to see if information has been called in to the pharmacy.      CTN reviewed, per chart review,  changes since last contact:   - Patient attended PCP office visit       Follow Up  Future Appointments   Date Time Provider Department Center   2024 11:40 AM Margie Bajwa MD [facility]   2024  9:20 AM Margie Bajwa MD [facility]   2024 10:00 AM Tarun Santillan MD [facility]       Care Transition Nurse reviewed medical action plan with patient and discussed any barriers to care and/or understanding of plan of care after discharge. Discussed appropriate site of care based on symptoms and resources available to patient including: PCP  Specialist  Urgent care clinics.   ED as needed     The patient agrees to contact the PCP office for questions related to their healthcare.     Advance Care Planning:   Patient advises that he does not currently have an ACP.   Patient is agreeable with information being sent to his home for review / completion.   referral to internal ACP facilitator with request for information to be sent to patient's home address.     Offered patient enrollment in the Remote Patient Monitoring (RPM) program for in-home monitoring:  n/a  .     Care Transitions Subsequent and Final Call    Subsequent and Final

## 2024-02-01 ENCOUNTER — TELEPHONE (OUTPATIENT)
Facility: CLINIC | Age: 74
End: 2024-02-01

## 2024-02-01 DIAGNOSIS — I10 ESSENTIAL (PRIMARY) HYPERTENSION: Primary | ICD-10-CM

## 2024-02-01 RX ORDER — ISOSORBIDE MONONITRATE 30 MG/1
30 TABLET, EXTENDED RELEASE ORAL DAILY
Qty: 90 TABLET | Refills: 1 | Status: SHIPPED | OUTPATIENT
Start: 2024-02-01

## 2024-02-01 NOTE — TELEPHONE ENCOUNTER
Patient request refill on Medication       Isosorbide       Location     [facility]

## 2024-02-06 ENCOUNTER — TELEPHONE (OUTPATIENT)
Age: 74
End: 2024-02-06

## 2024-02-06 ENCOUNTER — CARE COORDINATION (OUTPATIENT)
Facility: CLINIC | Age: 74
End: 2024-02-06

## 2024-02-06 NOTE — CARE COORDINATION
Care Transitions Follow Up Call    Patient Current Location:  Home: [address] [place] [phone]    LPN Care Coordinator attempted to contact contact the patient by telephone to follow up after admission. Message left on patient voicemail requesting a call back.    Patient: Jonathon Zee  Patient : 1950   MRN: [id number]  Reason for Admission: Anemia   Discharge Date: 24 RARS: Readmission Risk Score: 13.6      Needs to be reviewed by the provider   Additional needs identified to be addressed with provider:   none               Follow Up  Future Appointments   Date Time Provider Department Center   2024 11:40 AM Margie Bajwa MD [facility] BS AMB   2024  9:20 AM Margie Bajwa MD [facility] BS AMB   2024 10:00 AM Tarun Santillan MD [facility] BS AMB        Care Transitions Subsequent and Final Call    Subsequent and Final Calls  Care Transitions Interventions  Other Interventions:           Plan for follow-up call in 7-10 days based on severity of symptoms and risk factors.  Plan for next call:  assist with any needs, did patient receive acp in mail    Florina Charles LPN

## 2024-02-06 NOTE — TELEPHONE ENCOUNTER
Received request for cardiac clearance for cataract surgery from [facility]. Surgery date is 2/20/24.   Verbal order and read back per Tarun Santillan MD  Patient can proceed at low to moderate risk.   Form faxed back to [phone].

## 2024-02-16 ENCOUNTER — OFFICE VISIT (OUTPATIENT)
Facility: CLINIC | Age: 74
End: 2024-02-16

## 2024-02-16 VITALS
OXYGEN SATURATION: 100 % | WEIGHT: 163 LBS | TEMPERATURE: 98.6 F | HEART RATE: 75 BPM | DIASTOLIC BLOOD PRESSURE: 59 MMHG | SYSTOLIC BLOOD PRESSURE: 114 MMHG | RESPIRATION RATE: 16 BRPM | HEIGHT: 71 IN | BODY MASS INDEX: 22.82 KG/M2

## 2024-02-16 DIAGNOSIS — D50.9 IRON DEFICIENCY ANEMIA, UNSPECIFIED IRON DEFICIENCY ANEMIA TYPE: Primary | ICD-10-CM

## 2024-02-16 DIAGNOSIS — Z87.19 H/O: UPPER GI BLEED: ICD-10-CM

## 2024-02-16 DIAGNOSIS — I25.119 ATHEROSCLEROSIS OF NATIVE CORONARY ARTERY OF NATIVE HEART WITH ANGINA PECTORIS (HCC): ICD-10-CM

## 2024-02-16 DIAGNOSIS — E55.9 VITAMIN D DEFICIENCY, UNSPECIFIED: ICD-10-CM

## 2024-02-16 DIAGNOSIS — I10 ESSENTIAL (PRIMARY) HYPERTENSION: ICD-10-CM

## 2024-02-16 DIAGNOSIS — F10.21 ALCOHOL DEPENDENCE, IN REMISSION (HCC): ICD-10-CM

## 2024-02-16 DIAGNOSIS — E78.2 MIXED HYPERLIPIDEMIA: ICD-10-CM

## 2024-02-16 DIAGNOSIS — Z87.891 PERSONAL HISTORY OF TOBACCO USE: ICD-10-CM

## 2024-02-16 DIAGNOSIS — Z01.818 PRE-OPERATIVE CLEARANCE: ICD-10-CM

## 2024-02-16 DIAGNOSIS — E53.8 VITAMIN B12 DEFICIENCY: ICD-10-CM

## 2024-02-16 RX ORDER — CHOLECALCIFEROL (VITAMIN D3) 125 MCG
500 CAPSULE ORAL DAILY
Qty: 90 TABLET | Refills: 1 | Status: SHIPPED | OUTPATIENT
Start: 2024-02-16 | End: 2025-02-15

## 2024-02-16 RX ORDER — ERGOCALCIFEROL 1.25 MG/1
50000 CAPSULE ORAL WEEKLY
Qty: 10 CAPSULE | Refills: 0 | Status: SHIPPED | OUTPATIENT
Start: 2024-02-16

## 2024-02-16 NOTE — PROGRESS NOTES
\"Have you been to the ER, urgent care clinic since your last visit?  Hospitalized since your last visit?\"    NO    “Have you seen or consulted any other health care providers outside of [facility] since your last visit?”    NO          
toxic range, >100 ng/mL, be   retested 72 hours post fluorescein exposure.         No results found for any visits on 02/16/24.    Patient Care Team:  Patient Care Team:  Margie Bajwa MD as PCP - General  Margie Bajwa MD as PCP - Empaneled Provider  Bryan, Courtney, RN as Care Transitions Nurse  Lukasz Fletcher MD (Gastroenterology)      Assessment / Plan:     Diagnosis Orders   1. Iron deficiency anemia, unspecified iron deficiency anemia type  CBC with Auto Differential    Ferritin    Iron and TIBC      2. Essential (primary) hypertension        3. H/O: upper GI bleed        4. Alcohol dependence, in remission (HCC)        5. Atherosclerosis of native coronary artery of native heart with angina pectoris (HCC)        6. Personal history of tobacco use        7. Mixed hyperlipidemia        8. Vitamin B12 deficiency  Vitamin B12 & Folate    vitamin B-12 (CYANOCOBALAMIN) 500 MCG tablet      9. Vitamin D deficiency, unspecified  vitamin D (ERGOCALCIFEROL) 1.25 MG (19964 UT) CAPS capsule      10. Pre-operative clearance          Hospital records reviewed.  Recent labs reviewed.    Patient requesting to be cleared for cataract surgery.  His hemoglobin has improved and is trending up.  No further episodes of GI bleed.  Denies any abdominal pain, shortness of breath, chest pain.  Labs reviewed.  Patient is cleared for cataract surgery.    Iron deficiency anemia:   Iron levels remain low.  Will start on iron supplements.    GI bleed: Continue Protonix.    Vitamin B12 deficiency: Start on vitamin B12 supplements     HTN: BP is controlled.  Continue amlodipine and metoprolol.     CAD: Following with cardiology.  CT lung showed severe arteriosclerosis.  Continue Lipitor. Continue aspirin 81 mg daily.    HLD: Continue Lipitor.  LDL has improved but still not at goal.    History of vitamin D deficiency: Start on vitamin D supplements.    Weight loss: Weight has been stable.  Colonoscopy and CT were negative for

## 2024-02-19 ENCOUNTER — CARE COORDINATION (OUTPATIENT)
Facility: CLINIC | Age: 74
End: 2024-02-19

## 2024-02-19 NOTE — CARE COORDINATION
Patient has graduated from the Care Transitions program  program on 2-19-24. .  Patient/family has the ability to self-manage at this time.  Patient accepted referral to the ACM team for further management.     CTN spoke with patient via phone call.   Patient verified name and date of birth as identifiers.   Location:  [place]      Goals Addressed    None         Patient has Care Transition Nurse's contact information for any further questions, concerns, or needs.  Patients upcoming visits:    Future Appointments   Date Time Provider Department Center   4/17/2024  9:20 AM Margie Bajwa MD [facility]  AMB   4/26/2024 10:00 AM Tarun Santillan MD [facility] BS AMB   5/17/2024 10:20 AM Margie Bajwa MD [facility]  AMB         Care Transition Nurse identified needs for Ambulatory Care Management.   The following challenges have been identified         -  Patient reports that items such as a nicotine patch or gum is not covered through his insurance.         - Patient reports he is to have cataract  surgery on 2-20-24.         - Patient unsure if he has received ACP information in the mail.           Per Follow up with ACP staff member, information has been mailed to patient.         - Patient discharged from [facility] 1/20/24 related to Anemia.     Referral sent to the [facility] Ambulatory Care Management Team via secure staff messaging.

## 2024-02-20 ENCOUNTER — CARE COORDINATION (OUTPATIENT)
Facility: CLINIC | Age: 74
End: 2024-02-20

## 2024-02-20 ASSESSMENT — PATIENT HEALTH QUESTIONNAIRE - PHQ9
1. LITTLE INTEREST OR PLEASURE IN DOING THINGS: 0
SUM OF ALL RESPONSES TO PHQ QUESTIONS 1-9: 0
SUM OF ALL RESPONSES TO PHQ9 QUESTIONS 1 & 2: 0
SUM OF ALL RESPONSES TO PHQ QUESTIONS 1-9: 0
2. FEELING DOWN, DEPRESSED OR HOPELESS: 0

## 2024-02-20 NOTE — CARE COORDINATION
Ambulatory Care Coordination Note  2024    Patient Current Location:  Home: [address] [place] [phone]    ACM contacted the patient by telephone. Verified name and  with patient as identifiers. Provided introduction to self, and explanation of the ACM role.     ACM: Jez Doran RN    Challenges to be reviewed by the provider   Additional needs identified to be addressed with provider: No  none               Method of communication with provider: phone.    Patient enrolled in Complex Case Management effective 2024 and will be followed per ACM protocol. Initial questions answered with subsequent encounters planned.   Further / follow up appointments listed below - reviewed upcoming appointments  Further questions answered as needed and patient has ACM contact information  Depression screen done - PHQ2 score = 0  Respiratory and cardiac status shows no issues at present  Preliminary review of medications done during this encounter - will do full med rec on next encounter    Offered patient enrollment in the Remote Patient Monitoring (RPM) program for in-home monitoring: Patient declined.    Ambulatory Care Coordination Assessment    Care Coordination Protocol  Referral from Primary Care Provider: No  Week 1 - Initial Assessment     Do you have all of your prescriptions and are they filled?: Yes  Barriers to medication adherence: None  Are you able to afford your medications?: Yes  How often do you have trouble taking your medications the way you have been told to take them?: I always take them as prescribed.        Ability to seek help/take action for Emergent Urgent situations i.e. fire, crime, inclement weather or health crisis.: Independent  Ability to ambulate to restroom: Independent  Ability handle personal hygeine needs (bathing/dressing/grooming): Independent  Ability to manage Medications: Independent  Ability to prepare Food Preparation: Independent  Ability to maintain home

## 2024-02-27 ENCOUNTER — CARE COORDINATION (OUTPATIENT)
Facility: CLINIC | Age: 74
End: 2024-02-27

## 2024-02-27 NOTE — CARE COORDINATION
Patient attending appointment with PCP or Specialists at time of scheduled ACM follow up. Follow up call deferred at this time and ACM will contact Patient at the next scheduled encounter unless earlier encounter is necessary.

## 2024-03-05 ENCOUNTER — CARE COORDINATION (OUTPATIENT)
Facility: CLINIC | Age: 74
End: 2024-03-05

## 2024-03-12 ENCOUNTER — CARE COORDINATION (OUTPATIENT)
Facility: CLINIC | Age: 74
End: 2024-03-12

## 2024-03-19 ENCOUNTER — CARE COORDINATION (OUTPATIENT)
Facility: CLINIC | Age: 74
End: 2024-03-19

## 2024-04-02 ENCOUNTER — CARE COORDINATION (OUTPATIENT)
Facility: CLINIC | Age: 74
End: 2024-04-02

## 2024-04-02 ASSESSMENT — PATIENT HEALTH QUESTIONNAIRE - PHQ9
SUM OF ALL RESPONSES TO PHQ9 QUESTIONS 1 & 2: 0
SUM OF ALL RESPONSES TO PHQ QUESTIONS 1-9: 0
SUM OF ALL RESPONSES TO PHQ QUESTIONS 1-9: 0
2. FEELING DOWN, DEPRESSED OR HOPELESS: NOT AT ALL
SUM OF ALL RESPONSES TO PHQ QUESTIONS 1-9: 0
1. LITTLE INTEREST OR PLEASURE IN DOING THINGS: NOT AT ALL
SUM OF ALL RESPONSES TO PHQ QUESTIONS 1-9: 0

## 2024-04-02 NOTE — CARE COORDINATION
difficulties, concentration): Clear and open communication, no identified barriers   Do other services need to be involved to help this patient?: Other care/services not required at this time   Suggested Interventions and Community Resources                  Future Appointments   Date Time Provider Department Center   4/17/2024  9:20 AM Margie Bajwa MD [facility] BS AMB   4/26/2024 10:00 AM Tarun Santillan MD [facility] BS AMB   5/17/2024 10:20 AM Margie Bajwa MD [facility] BS AMB      and   Hypertension - Encounter Level    Symptom course: stable

## 2024-04-15 ENCOUNTER — TELEPHONE (OUTPATIENT)
Facility: CLINIC | Age: 74
End: 2024-04-15

## 2024-04-15 NOTE — TELEPHONE ENCOUNTER
Patient called in and asked about appt on 04/17 and expressed concern he may be late. Asked about labs needing to be done, and advised that he could do a walk-in at [facility] or we could schedule an appointment here. Patient is going to hospital for labs.

## 2024-04-16 ENCOUNTER — HOSPITAL ENCOUNTER (OUTPATIENT)
Facility: HOSPITAL | Age: 74
Discharge: HOME | End: 2024-04-19
Payer: MEDICARE

## 2024-04-16 ENCOUNTER — CARE COORDINATION (OUTPATIENT)
Facility: CLINIC | Age: 74
End: 2024-04-16

## 2024-04-16 DIAGNOSIS — D50.9 IRON DEFICIENCY ANEMIA, UNSPECIFIED IRON DEFICIENCY ANEMIA TYPE: ICD-10-CM

## 2024-04-16 DIAGNOSIS — E53.8 VITAMIN B12 DEFICIENCY: ICD-10-CM

## 2024-04-16 LAB
BASOPHILS # BLD: 0.1 K/UL (ref 0–0.1)
BASOPHILS NFR BLD: 2 % (ref 0–2)
DIFFERENTIAL METHOD BLD: ABNORMAL
EOSINOPHIL # BLD: 0.1 K/UL (ref 0–0.4)
EOSINOPHIL NFR BLD: 2 % (ref 0–5)
ERYTHROCYTE [DISTWIDTH] IN BLOOD BY AUTOMATED COUNT: 18.7 % (ref 11.6–14.5)
FERRITIN SERPL-MCNC: 4 NG/ML (ref 8–388)
FOLATE SERPL-MCNC: 6.1 NG/ML (ref 3.1–17.5)
HCT VFR BLD AUTO: 31 % (ref 36–48)
HGB BLD-MCNC: 9.2 G/DL (ref 13–16)
IMM GRANULOCYTES # BLD AUTO: 0 K/UL (ref 0–0.04)
IMM GRANULOCYTES NFR BLD AUTO: 0 % (ref 0–0.5)
IRON SATN MFR SERPL: 4 % (ref 20–50)
IRON SERPL-MCNC: 16 UG/DL (ref 50–175)
LYMPHOCYTES # BLD: 1.3 K/UL (ref 0.9–3.6)
LYMPHOCYTES NFR BLD: 29 % (ref 21–52)
MCH RBC QN AUTO: 22.5 PG (ref 24–34)
MCHC RBC AUTO-ENTMCNC: 29.7 G/DL (ref 31–37)
MCV RBC AUTO: 75.8 FL (ref 78–100)
MONOCYTES # BLD: 0.5 K/UL (ref 0.05–1.2)
MONOCYTES NFR BLD: 11 % (ref 3–10)
NEUTS SEG # BLD: 2.4 K/UL (ref 1.8–8)
NEUTS SEG NFR BLD: 56 % (ref 40–73)
NRBC # BLD: 0 K/UL (ref 0–0.01)
NRBC BLD-RTO: 0 PER 100 WBC
PLATELET # BLD AUTO: 181 K/UL (ref 135–420)
RBC # BLD AUTO: 4.09 M/UL (ref 4.35–5.65)
TIBC SERPL-MCNC: 369 UG/DL (ref 250–450)
VIT B12 SERPL-MCNC: 151 PG/ML (ref 211–911)
WBC # BLD AUTO: 4.3 K/UL (ref 4.6–13.2)

## 2024-04-16 PROCEDURE — 83540 ASSAY OF IRON: CPT

## 2024-04-16 PROCEDURE — 82607 VITAMIN B-12: CPT

## 2024-04-16 PROCEDURE — 85025 COMPLETE CBC W/AUTO DIFF WBC: CPT

## 2024-04-16 PROCEDURE — 82746 ASSAY OF FOLIC ACID SERUM: CPT

## 2024-04-16 PROCEDURE — 82728 ASSAY OF FERRITIN: CPT

## 2024-04-16 PROCEDURE — 83550 IRON BINDING TEST: CPT

## 2024-04-16 PROCEDURE — 36415 COLL VENOUS BLD VENIPUNCTURE: CPT

## 2024-04-17 ENCOUNTER — OFFICE VISIT (OUTPATIENT)
Facility: CLINIC | Age: 74
End: 2024-04-17
Payer: MEDICARE

## 2024-04-17 VITALS
TEMPERATURE: 96.9 F | DIASTOLIC BLOOD PRESSURE: 71 MMHG | HEART RATE: 54 BPM | OXYGEN SATURATION: 97 % | RESPIRATION RATE: 16 BRPM | SYSTOLIC BLOOD PRESSURE: 122 MMHG | WEIGHT: 163 LBS | BODY MASS INDEX: 22.82 KG/M2 | HEIGHT: 71 IN

## 2024-04-17 DIAGNOSIS — Z71.89 ADVANCED CARE PLANNING/COUNSELING DISCUSSION: ICD-10-CM

## 2024-04-17 DIAGNOSIS — E53.8 VITAMIN B12 DEFICIENCY: ICD-10-CM

## 2024-04-17 DIAGNOSIS — Z87.19 H/O: UPPER GI BLEED: ICD-10-CM

## 2024-04-17 DIAGNOSIS — F43.9 STRESS: ICD-10-CM

## 2024-04-17 DIAGNOSIS — E78.2 MIXED HYPERLIPIDEMIA: ICD-10-CM

## 2024-04-17 DIAGNOSIS — D50.9 MICROCYTIC ANEMIA: ICD-10-CM

## 2024-04-17 DIAGNOSIS — Z72.0 TOBACCO USE: ICD-10-CM

## 2024-04-17 DIAGNOSIS — I10 ESSENTIAL (PRIMARY) HYPERTENSION: ICD-10-CM

## 2024-04-17 DIAGNOSIS — I25.119 ATHEROSCLEROSIS OF NATIVE CORONARY ARTERY OF NATIVE HEART WITH ANGINA PECTORIS (HCC): ICD-10-CM

## 2024-04-17 DIAGNOSIS — E55.9 VITAMIN D DEFICIENCY, UNSPECIFIED: ICD-10-CM

## 2024-04-17 DIAGNOSIS — D50.9 IRON DEFICIENCY ANEMIA, UNSPECIFIED IRON DEFICIENCY ANEMIA TYPE: ICD-10-CM

## 2024-04-17 DIAGNOSIS — I50.20 HFREF (HEART FAILURE WITH REDUCED EJECTION FRACTION) (HCC): ICD-10-CM

## 2024-04-17 DIAGNOSIS — Z00.00 MEDICARE ANNUAL WELLNESS VISIT, SUBSEQUENT: Primary | ICD-10-CM

## 2024-04-17 PROCEDURE — 4004F PT TOBACCO SCREEN RCVD TLK: CPT | Performed by: STUDENT IN AN ORGANIZED HEALTH CARE EDUCATION/TRAINING PROGRAM

## 2024-04-17 PROCEDURE — G0439 PPPS, SUBSEQ VISIT: HCPCS | Performed by: STUDENT IN AN ORGANIZED HEALTH CARE EDUCATION/TRAINING PROGRAM

## 2024-04-17 PROCEDURE — G8427 DOCREV CUR MEDS BY ELIG CLIN: HCPCS | Performed by: STUDENT IN AN ORGANIZED HEALTH CARE EDUCATION/TRAINING PROGRAM

## 2024-04-17 PROCEDURE — 3017F COLORECTAL CA SCREEN DOC REV: CPT | Performed by: STUDENT IN AN ORGANIZED HEALTH CARE EDUCATION/TRAINING PROGRAM

## 2024-04-17 PROCEDURE — 3078F DIAST BP <80 MM HG: CPT | Performed by: STUDENT IN AN ORGANIZED HEALTH CARE EDUCATION/TRAINING PROGRAM

## 2024-04-17 PROCEDURE — G8420 CALC BMI NORM PARAMETERS: HCPCS | Performed by: STUDENT IN AN ORGANIZED HEALTH CARE EDUCATION/TRAINING PROGRAM

## 2024-04-17 PROCEDURE — 99214 OFFICE O/P EST MOD 30 MIN: CPT | Performed by: STUDENT IN AN ORGANIZED HEALTH CARE EDUCATION/TRAINING PROGRAM

## 2024-04-17 PROCEDURE — 3074F SYST BP LT 130 MM HG: CPT | Performed by: STUDENT IN AN ORGANIZED HEALTH CARE EDUCATION/TRAINING PROGRAM

## 2024-04-17 PROCEDURE — 1123F ACP DISCUSS/DSCN MKR DOCD: CPT | Performed by: STUDENT IN AN ORGANIZED HEALTH CARE EDUCATION/TRAINING PROGRAM

## 2024-04-17 RX ORDER — FERROUS SULFATE 325(65) MG
325 TABLET ORAL
Qty: 90 TABLET | Refills: 1 | Status: SHIPPED | OUTPATIENT
Start: 2024-04-17

## 2024-04-17 RX ORDER — BUPROPION HYDROCHLORIDE 150 MG/1
150 TABLET ORAL EVERY MORNING
Qty: 30 TABLET | Refills: 3 | Status: SHIPPED | OUTPATIENT
Start: 2024-04-17

## 2024-04-17 SDOH — ECONOMIC STABILITY: FOOD INSECURITY: WITHIN THE PAST 12 MONTHS, THE FOOD YOU BOUGHT JUST DIDN'T LAST AND YOU DIDN'T HAVE MONEY TO GET MORE.: NEVER TRUE

## 2024-04-17 SDOH — ECONOMIC STABILITY: FOOD INSECURITY: WITHIN THE PAST 12 MONTHS, YOU WORRIED THAT YOUR FOOD WOULD RUN OUT BEFORE YOU GOT MONEY TO BUY MORE.: NEVER TRUE

## 2024-04-17 SDOH — ECONOMIC STABILITY: INCOME INSECURITY: HOW HARD IS IT FOR YOU TO PAY FOR THE VERY BASICS LIKE FOOD, HOUSING, MEDICAL CARE, AND HEATING?: NOT HARD AT ALL

## 2024-04-17 ASSESSMENT — LIFESTYLE VARIABLES
HOW MANY STANDARD DRINKS CONTAINING ALCOHOL DO YOU HAVE ON A TYPICAL DAY: PATIENT DOES NOT DRINK
HOW OFTEN DO YOU HAVE A DRINK CONTAINING ALCOHOL: NEVER

## 2024-04-17 ASSESSMENT — PATIENT HEALTH QUESTIONNAIRE - PHQ9
SUM OF ALL RESPONSES TO PHQ QUESTIONS 1-9: 0
SUM OF ALL RESPONSES TO PHQ QUESTIONS 1-9: 0
SUM OF ALL RESPONSES TO PHQ9 QUESTIONS 1 & 2: 0
SUM OF ALL RESPONSES TO PHQ QUESTIONS 1-9: 0
1. LITTLE INTEREST OR PLEASURE IN DOING THINGS: NOT AT ALL
SUM OF ALL RESPONSES TO PHQ QUESTIONS 1-9: 0
2. FEELING DOWN, DEPRESSED OR HOPELESS: NOT AT ALL

## 2024-04-17 ASSESSMENT — ANXIETY QUESTIONNAIRES
3. WORRYING TOO MUCH ABOUT DIFFERENT THINGS: NOT AT ALL
2. NOT BEING ABLE TO STOP OR CONTROL WORRYING: NOT AT ALL
7. FEELING AFRAID AS IF SOMETHING AWFUL MIGHT HAPPEN: NOT AT ALL
1. FEELING NERVOUS, ANXIOUS, OR ON EDGE: NOT AT ALL
IF YOU CHECKED OFF ANY PROBLEMS ON THIS QUESTIONNAIRE, HOW DIFFICULT HAVE THESE PROBLEMS MADE IT FOR YOU TO DO YOUR WORK, TAKE CARE OF THINGS AT HOME, OR GET ALONG WITH OTHER PEOPLE: NOT DIFFICULT AT ALL
6. BECOMING EASILY ANNOYED OR IRRITABLE: NOT AT ALL
GAD7 TOTAL SCORE: 0
5. BEING SO RESTLESS THAT IT IS HARD TO SIT STILL: NOT AT ALL
4. TROUBLE RELAXING: NOT AT ALL

## 2024-04-17 ASSESSMENT — ENCOUNTER SYMPTOMS
ABDOMINAL PAIN: 0
DIARRHEA: 0
NAUSEA: 0
BACK PAIN: 0
RHINORRHEA: 0
CHEST TIGHTNESS: 0
VOMITING: 0

## 2024-04-17 NOTE — PROGRESS NOTES
Jonathon Zee is a 74 y.o. male presenting today for Medicare AWV  .     Chief Complaint   Patient presents with    Medicare AWV       HPI:  Jnoathon Zee presents to the office today for Pre-Op Clearance.     Patient has a PMHx of HTN, HLD, CAD, tobacco abuse, history of alcohol abuse, anemia 2/2 UGIB, HFrEF.    He was recently admitted for GI bleed.  Patient had blood transfusions and was seen by GI.  He had an EGD and colonoscopy on 1/18/2024 which showed small internal hemorrhoids in the rectum, mild to moderate diverticulosis in sigmoid descending and transverse colon.  He was found to have 6 AVMs that required arise in APC.  He was also found to have polyps.  EGD showed no significant findings.  He was discharged on Protonix and Metamucil.   was changed to ASA 81 mg daily.    CBC after discharge has improved to 9.9 in 1/2024.  Iron levels are low with ferritin 136.  Vitamin B12 level was low at 157.  Potassium level was normal at 4.0.    Recent CBC shows hemoglobin 9.2 with low iron levels, ferritin 4.0.  Patient admits he never picked up the iron supplements and has not been taking them.    CAD: Patient follows with cardiology: Dr. Santillan.  Nuclear stress test showed EF less than 40% with large fixed defect.  He was continued on medical management.    HTN: He is prescribed amlodipine and Toprol-XL    HLD: Lipid panel showed LDL improved to 89.  He is on lipitor 40 mg daily.     He underwent CT lung cancer screen in 4/22 which was negative but showed severe coronary arteriosclerosis.  Also noted to have mild bronchitis and emphysema.  Repeat CT has been ordered and is pending.       Smoker: Patient is a heavy smoker. He smokes 1.5 ppd since 4 years. He started smoking at 15 yrs of age around 0.5-1 ppd. He has not tried to quit.  He was prescribed nicotine lozenges/patches but never took them.     History of alcohol abuse: Patient abused alcohol for many years.  Patient reports he has not been

## 2024-04-17 NOTE — PROGRESS NOTES
Medicare Annual Wellness Visit    Jonathon Zee is here for Medicare AWV    Assessment & Plan   Medicare annual wellness visit, subsequent    Advanced care planning/counseling discussion    Recommendations for Preventive Services Due: see orders and patient instructions/AVS.  Recommended screening schedule for the next 5-10 years is provided to the patient in written form: see Patient Instructions/AVS.    ACP documents provided     Return in about 4 months (around 8/17/2024).     Subjective       Patient's complete Health Risk Assessment and screening values have been reviewed and are found in Flowsheets. The following problems were reviewed today and where indicated follow up appointments were made and/or referrals ordered.    Positive Risk Factor Screenings with Interventions:               General HRA Questions:  Select all that apply: (!) New or Increased Fatigue, Loneliness, Anger, Stress    Fatigue Interventions:  Started on iron supplements    Loneliness Interventions:  Medication adjusted:      Stress Interventions:  Medication adjusted:      Anger Interventions:  Medication adjusted:      Patient reports depressive symptoms-overthinking.  Started on Wellbutrin      Activity, Diet, and Weight:  On average, how many days per week do you engage in moderate to strenuous exercise (like a brisk walk)?: 0 days  On average, how many minutes do you engage in exercise at this level?: 0 min    Do you eat balanced/healthy meals regularly?: (!) No    Body mass index is 22.73 kg/m².      Inactivity Interventions:  Recommendations: patient agrees to exercise for at least 150 minutes/week  Do you eat balanced/healthy meals regularly Interventions:  See AVS for additional education material        Dentist Screen:  Have you seen the dentist within the past year?: (!) No    Intervention:  Not applicable - dentures     Vision Screen:  Do you have difficulty driving, watching TV, or doing any of your daily activities because

## 2024-04-17 NOTE — ACP (ADVANCE CARE PLANNING)
Advance Care Planning     Advance Care Planning (ACP) Physician/NP/PA Conversation    Date of Conversation: 4/17/2024  Conducted with: Patient with Decision Making Capacity    Healthcare Decision Maker:      Primary Decision Maker: AmbrosioPenniea - Other - [phone]    Secondary Decision Maker: Guillermo Zee - Other - [phone]    Click here to complete Healthcare Decision Makers including selection of the Healthcare Decision Maker Relationship (ie \"Primary\")      Care Preferences:    Hospitalization:  \"If your health worsens and it becomes clear that your chance of recovery is unlikely, what would be your preference regarding hospitalization?\"  The patient would prefer hospitalization.    Ventilation:  \"If you were unable to breath on your own and your chance of recovery was unlikely, what would be your preference about the use of a ventilator (breathing machine) if it was available to you?\"  The patient would desire the use of a ventilator.    Resuscitation:  \"In the event your heart stopped as a result of an underlying serious health condition, would you want attempts made to restart your heart, or would you prefer a natural death?\"  Yes, attempt to resuscitate.    treatment goals    Conversation Outcomes / Follow-Up Plan:  ACP complete - no further action today  Reviewed DNR/DNI and patient elects Full Code (Attempt Resuscitation)    Length of Voluntary ACP Conversation in minutes:  <16 minutes (Non-Billable)    Margie Bajwa MD

## 2024-04-26 ENCOUNTER — OFFICE VISIT (OUTPATIENT)
Age: 74
End: 2024-04-26
Payer: MEDICARE

## 2024-04-26 VITALS
HEIGHT: 71 IN | DIASTOLIC BLOOD PRESSURE: 70 MMHG | HEART RATE: 71 BPM | BODY MASS INDEX: 23.1 KG/M2 | WEIGHT: 165 LBS | SYSTOLIC BLOOD PRESSURE: 150 MMHG | OXYGEN SATURATION: 100 %

## 2024-04-26 DIAGNOSIS — R06.02 SOB (SHORTNESS OF BREATH): ICD-10-CM

## 2024-04-26 DIAGNOSIS — I25.119 ATHEROSCLEROSIS OF NATIVE CORONARY ARTERY WITH ANGINA PECTORIS, UNSPECIFIED WHETHER NATIVE OR TRANSPLANTED HEART (HCC): Primary | ICD-10-CM

## 2024-04-26 DIAGNOSIS — R07.9 CHEST PAIN, UNSPECIFIED TYPE: ICD-10-CM

## 2024-04-26 PROCEDURE — G8420 CALC BMI NORM PARAMETERS: HCPCS | Performed by: INTERNAL MEDICINE

## 2024-04-26 PROCEDURE — G8427 DOCREV CUR MEDS BY ELIG CLIN: HCPCS | Performed by: INTERNAL MEDICINE

## 2024-04-26 PROCEDURE — 3077F SYST BP >= 140 MM HG: CPT | Performed by: INTERNAL MEDICINE

## 2024-04-26 PROCEDURE — 99214 OFFICE O/P EST MOD 30 MIN: CPT | Performed by: INTERNAL MEDICINE

## 2024-04-26 PROCEDURE — 1123F ACP DISCUSS/DSCN MKR DOCD: CPT | Performed by: INTERNAL MEDICINE

## 2024-04-26 PROCEDURE — 3017F COLORECTAL CA SCREEN DOC REV: CPT | Performed by: INTERNAL MEDICINE

## 2024-04-26 PROCEDURE — 4004F PT TOBACCO SCREEN RCVD TLK: CPT | Performed by: INTERNAL MEDICINE

## 2024-04-26 PROCEDURE — 3078F DIAST BP <80 MM HG: CPT | Performed by: INTERNAL MEDICINE

## 2024-04-26 PROCEDURE — 93000 ELECTROCARDIOGRAM COMPLETE: CPT | Performed by: INTERNAL MEDICINE

## 2024-04-26 ASSESSMENT — PATIENT HEALTH QUESTIONNAIRE - PHQ9
SUM OF ALL RESPONSES TO PHQ QUESTIONS 1-9: 0
SUM OF ALL RESPONSES TO PHQ9 QUESTIONS 1 & 2: 0
SUM OF ALL RESPONSES TO PHQ QUESTIONS 1-9: 0
1. LITTLE INTEREST OR PLEASURE IN DOING THINGS: NOT AT ALL
SUM OF ALL RESPONSES TO PHQ QUESTIONS 1-9: 0
SUM OF ALL RESPONSES TO PHQ QUESTIONS 1-9: 0
2. FEELING DOWN, DEPRESSED OR HOPELESS: NOT AT ALL

## 2024-04-26 NOTE — PROGRESS NOTES
HISTORY OF PRESENT ILLNESS  Jonathon Zee  74 y.o. male     Chief Complaint   Patient presents with    Follow-up     yearly       ASSESSMENT and PLAN    The primary encounter diagnosis was Atherosclerosis of native coronary artery with angina pectoris, unspecified whether native or transplanted heart (HCC). Diagnoses of SOB (shortness of breath) and Chest pain, unspecified type were also pertinent to this visit.    Mr. Jonathon Zee has history of small vessel disease in his coronary circulation from 2001 heart catheterization as well as mild disease involving his RCA and OM branch.  He has not had chest pains. He has done well on medical therapy. Unfortunately, he has long history of cigarettes about one pack per day.    He was a heavy drinker in the past; however, he has gradually decreased his alcohol intake.  In 2020, he was able to discontinue alcohol use completely. Because of erectile dysfunction, he had taken Cialis in the past. He was able to come off long-acting nitrates by cutting back on his alcohol intake.  His nuclear scan done in May 2023 revealed EF 36% with probably normal perfusion.    CAD:    He has known history of distal small vessel disease.  He is without chest pains.  He remains clinically stable.  BP:    Mildly elevated at 150/70.  He states that he does not always take his blood pressure medications.  Again, strong admonishment was given for medication compliance.  I will have him come back for blood pressure check in about 3 weeks.  Rhythm:    Sinus rhythm at 71 bpm.  He has first-degree AVB.  He has IVCD pattern noted.  CHF:    There is no evidence of decompensated CHF noted.  Weight:     His weight today is 165 pounds.  His baseline weight is 175 pounds.  Cholesterol:   Target LDL <70.   Lipitor 40 mg daily.  Tobacco:   Still smokes about half a pack daily.  Anti-platelet:   Remains on ASA.     Again, I encouraged tobacco cessation as well as medication compliance.  I will see him back

## 2024-04-26 NOTE — PROGRESS NOTES
Jonathon Zee presents today for   Chief Complaint   Patient presents with    Follow-up     yearly       Jonathon Zee preferred language for health care discussion is english/other.    Is someone accompanying this pt? no    Is the patient using any DME equipment during OV? no    Depression Screening:  Depression: Not at risk (4/26/2024)    PHQ-2     PHQ-2 Score: 0        Learning Assessment:  Who is the primary learner? Patient    What is the preferred language for health care of the primary learner? ENGLISH    How does the primary learner prefer to learn new concepts? DEMONSTRATION    Answered By patient    Relationship to Learner SELF           Pt currently taking Anticoagulant therapy? no    Pt currently taking Antiplatelet therapy ? Aspirin 81mg daily      Coordination of Care:  1. Have you been to the ER, urgent care clinic since your last visit? Hospitalized since your last visit? no    2. Have you seen or consulted any other health care providers outside of the [facility] since your last visit? Include any pap smears or colon screening. no

## 2024-04-30 ENCOUNTER — CARE COORDINATION (OUTPATIENT)
Facility: CLINIC | Age: 74
End: 2024-04-30

## 2024-05-07 ENCOUNTER — CARE COORDINATION (OUTPATIENT)
Facility: CLINIC | Age: 74
End: 2024-05-07

## 2024-05-07 ASSESSMENT — PATIENT HEALTH QUESTIONNAIRE - PHQ9
SUM OF ALL RESPONSES TO PHQ QUESTIONS 1-9: 0
SUM OF ALL RESPONSES TO PHQ QUESTIONS 1-9: 0
SUM OF ALL RESPONSES TO PHQ9 QUESTIONS 1 & 2: 0
2. FEELING DOWN, DEPRESSED OR HOPELESS: NOT AT ALL
SUM OF ALL RESPONSES TO PHQ QUESTIONS 1-9: 0
SUM OF ALL RESPONSES TO PHQ QUESTIONS 1-9: 0
1. LITTLE INTEREST OR PLEASURE IN DOING THINGS: NOT AT ALL

## 2024-05-07 NOTE — CARE COORDINATION
hygeine needs (bathing/dressing/grooming): Independent  Ability to manage Medications: Independent  Ability to prepare Food Preparation: Independent  Ability to maintain home (clean home, laundry): Needs Assistance  Ability to drive and/or has transportation: Needs Assistance  Ability to do shopping: Needs Assistance  Ability to manage finances: Needs Assistance  Is patient able to live independently?: Yes     Current Housing: Private Residence  Who do you live with?: Alone  Are you an active caregiver in your home?: No     Do you have any DME?: No              Thinking about your patient's physical health needs, are there any symptoms or problems (risk indicators) you are unsure about that require further investigation?: Mild vague physical symptoms or problems; but do not impact on daily life or are not of concern to patient   Are the patient’s physical health problems impacting on their mental well-being?: Mild impact on mental well-being e.g. \"\"feeling fed-up\"\", \"\"reduced enjoyment\"\"   Are there any problems with your patient’s lifestyle behaviors (alcohol, drugs, diet, exercise) that are impacting on physical or mental well-being?: Some mild concern of potential negative impact on well-being   Do you have any other concerns about your patient’s mental well-being? How would you rate their severity and impact on the patient?: Mild problems - don't interfere with function   How would you rate their home environment in terms of safety and stability (including domestic violence, insecure housing, neighbor harassment)?: Consistently safe, supportive, stable, no identified problems   How wells does the patient now understand their health and well-being (symptoms, signs or risk factors) and what they need to do to manage their health?: Reasonable to good understanding but do not feel able to engage with advice at this time   How well do you think your patient can engage in healthcare discussions? (Barriers include

## 2024-05-14 ENCOUNTER — CARE COORDINATION (OUTPATIENT)
Facility: CLINIC | Age: 74
End: 2024-05-14

## 2024-05-21 ENCOUNTER — CARE COORDINATION (OUTPATIENT)
Facility: CLINIC | Age: 74
End: 2024-05-21

## 2024-05-21 ASSESSMENT — PATIENT HEALTH QUESTIONNAIRE - PHQ9
SUM OF ALL RESPONSES TO PHQ QUESTIONS 1-9: 2
SUM OF ALL RESPONSES TO PHQ9 QUESTIONS 1 & 2: 2
SUM OF ALL RESPONSES TO PHQ QUESTIONS 1-9: 2
2. FEELING DOWN, DEPRESSED OR HOPELESS: SEVERAL DAYS
SUM OF ALL RESPONSES TO PHQ QUESTIONS 1-9: 2
1. LITTLE INTEREST OR PLEASURE IN DOING THINGS: SEVERAL DAYS
SUM OF ALL RESPONSES TO PHQ QUESTIONS 1-9: 2

## 2024-05-21 NOTE — CARE COORDINATION
Ambulatory Care Coordination Note  2024    Patient Current Location:  Home: [address] [place] [phone]    ACM contacted the patient by telephone. Verified name and  with patient as identifiers. Provided introduction to self, and explanation of the ACM role.     ACM: Jez Doran RN    Challenges to be reviewed by the provider   Additional needs identified to be addressed with provider: No  none               Method of communication with provider: phone.    Spoke with patient - Patient states doing well at present. Reports slight depression - remains on Wellbutrin XL.  No notable change in Patient health status from last encounter. No ER visit or IP admission since last encounter. Follow up appointments listed below and questions from Patient / Care Giver answered.  Patient has ACM contact information.   Respiratory and cardiac status shows no issues at present  Medication reconciliation done at this encounter with patient. Shows understanding of medication therapy    HTN Teaching        Always take medication as prescribed . Do not skip or stop medication unless specifically instructed to by MD or PCP. If you forget to take a dose, take it as soon as you remember. However, if it is almost time for your next dose, skip the missed dose and go back to your original schedule. Discussed symptoms of HTN - low sodium diet      Ambulatory Care Coordination Assessment    Care Coordination Protocol  Referral from Primary Care Provider: No  Week 1 - Initial Assessment     Do you have all of your prescriptions and are they filled?: Yes  Barriers to medication adherence: None  Are you able to afford your medications?: Yes  How often do you have trouble taking your medications the way you have been told to take them?: I always take them as prescribed.        Ability to seek help/take action for Emergent Urgent situations i.e. fire, crime, inclement weather or health crisis.: Independent  Ability to

## 2024-05-31 ENCOUNTER — OFFICE VISIT (OUTPATIENT)
Age: 74
End: 2024-05-31
Payer: MEDICARE

## 2024-05-31 VITALS
DIASTOLIC BLOOD PRESSURE: 66 MMHG | SYSTOLIC BLOOD PRESSURE: 138 MMHG | HEART RATE: 51 BPM | HEIGHT: 71 IN | OXYGEN SATURATION: 100 % | WEIGHT: 162 LBS | BODY MASS INDEX: 22.68 KG/M2

## 2024-05-31 DIAGNOSIS — I25.119 ATHEROSCLEROSIS OF NATIVE CORONARY ARTERY WITH ANGINA PECTORIS, UNSPECIFIED WHETHER NATIVE OR TRANSPLANTED HEART (HCC): Primary | ICD-10-CM

## 2024-05-31 DIAGNOSIS — R06.02 SOB (SHORTNESS OF BREATH): ICD-10-CM

## 2024-05-31 DIAGNOSIS — R07.9 CHEST PAIN, UNSPECIFIED TYPE: ICD-10-CM

## 2024-05-31 PROCEDURE — G8420 CALC BMI NORM PARAMETERS: HCPCS | Performed by: INTERNAL MEDICINE

## 2024-05-31 PROCEDURE — 1123F ACP DISCUSS/DSCN MKR DOCD: CPT | Performed by: INTERNAL MEDICINE

## 2024-05-31 PROCEDURE — 3075F SYST BP GE 130 - 139MM HG: CPT | Performed by: INTERNAL MEDICINE

## 2024-05-31 PROCEDURE — 93000 ELECTROCARDIOGRAM COMPLETE: CPT | Performed by: INTERNAL MEDICINE

## 2024-05-31 PROCEDURE — 99214 OFFICE O/P EST MOD 30 MIN: CPT | Performed by: INTERNAL MEDICINE

## 2024-05-31 PROCEDURE — 3017F COLORECTAL CA SCREEN DOC REV: CPT | Performed by: INTERNAL MEDICINE

## 2024-05-31 PROCEDURE — 4004F PT TOBACCO SCREEN RCVD TLK: CPT | Performed by: INTERNAL MEDICINE

## 2024-05-31 PROCEDURE — 3078F DIAST BP <80 MM HG: CPT | Performed by: INTERNAL MEDICINE

## 2024-05-31 PROCEDURE — G8427 DOCREV CUR MEDS BY ELIG CLIN: HCPCS | Performed by: INTERNAL MEDICINE

## 2024-05-31 ASSESSMENT — PATIENT HEALTH QUESTIONNAIRE - PHQ9
2. FEELING DOWN, DEPRESSED OR HOPELESS: NOT AT ALL
SUM OF ALL RESPONSES TO PHQ QUESTIONS 1-9: 0
1. LITTLE INTEREST OR PLEASURE IN DOING THINGS: NOT AT ALL
SUM OF ALL RESPONSES TO PHQ QUESTIONS 1-9: 0
SUM OF ALL RESPONSES TO PHQ9 QUESTIONS 1 & 2: 0
SUM OF ALL RESPONSES TO PHQ QUESTIONS 1-9: 0
SUM OF ALL RESPONSES TO PHQ QUESTIONS 1-9: 0

## 2024-05-31 NOTE — PROGRESS NOTES
Jonathon Zee presents today for   Chief Complaint   Patient presents with    Follow-up       Jonathon VENTURA Zee preferred language for health care discussion is english/other.    Is someone accompanying this pt? no    Is the patient using any DME equipment during OV? no    Depression Screening:  Depression: Not at risk (5/31/2024)    PHQ-2     PHQ-2 Score: 0        Learning Assessment:  Who is the primary learner? Patient    What is the preferred language for health care of the primary learner? ENGLISH    How does the primary learner prefer to learn new concepts? DEMONSTRATION    Answered By patient    Relationship to Learner SELF           Pt currently taking Anticoagulant therapy? no    Pt currently taking Antiplatelet therapy ? Aspirin 81 mg daily      Coordination of Care:  1. Have you been to the ER, urgent care clinic since your last visit? Hospitalized since your last visit? no    2. Have you seen or consulted any other health care providers outside of the [facility] since your last visit? Include any pap smears or colon screening. no    
tablet by mouth every morning (before breakfast) 90 tablet 1     No current facility-administered medications for this visit.       The patient has a family history of    Social History     Tobacco Use    Smoking status: Every Day    Smokeless tobacco: Never   Substance Use Topics    Alcohol use: Not Currently    Drug use: No       Lab Results   Component Value Date/Time    CHOL 138 01/16/2024 10:10 AM    HDL 41 01/16/2024 10:10 AM    LDL 89 01/16/2024 10:10 AM        BP Readings from Last 3 Encounters:   05/31/24 138/66   04/26/24 (!) 150/70   04/17/24 122/71        Pulse Readings from Last 3 Encounters:   05/31/24 51   04/26/24 71   04/17/24 54       Wt Readings from Last 3 Encounters:   05/31/24 73.5 kg (162 lb)   04/26/24 74.8 kg (165 lb)   04/17/24 73.9 kg (163 lb)         EKG: sinus bradycardia, occasional PVC noted, unifocal, IVCD.     Tarun Santillan MD   5/31/2024

## 2024-06-04 ENCOUNTER — CARE COORDINATION (OUTPATIENT)
Facility: CLINIC | Age: 74
End: 2024-06-04

## 2024-06-11 ENCOUNTER — CARE COORDINATION (OUTPATIENT)
Facility: CLINIC | Age: 74
End: 2024-06-11

## 2024-06-11 NOTE — CARE COORDINATION
Ambulatory Care Coordination Note     6/11/2024 2:59 PM     patient outreach attempt by this ACM today to perform care management follow up . ACM was unable to reach the patient by telephone today; voicemail full and unable to leave a message.      ACM: Jez Doran RN     Care Summary Note: NA    PCP/Specialist follow up:   Future Appointments         Provider Specialty Dept Phone    8/21/2024 10:00 AM Margie Bajwa MD Family Medicine [phone]    6/3/2025 10:00 AM Tarun Santillan MD Cardiology [phone]            Follow Up:   Plan for next ACM outreach in approximately 1 week to complete:  - advance care planning  - goal progression  - education .

## 2024-06-19 ENCOUNTER — CARE COORDINATION (OUTPATIENT)
Facility: CLINIC | Age: 74
End: 2024-06-19

## 2024-06-19 NOTE — CARE COORDINATION
Ambulatory Care Coordination Note     6/19/2024 12:50 PM     patient outreach attempt by this ACM today to perform care management follow up . ACM was unable to reach the patient by telephone today; left voice message requesting a return phone call to this ACM.     Patient closed (unable to reach patient) from the High Risk Care Management program on 6/19/2024.  Patient  letter sent .  Care management goals have been completed. No further Ambulatory Care Manager follow up scheduled.

## 2024-07-05 RX ORDER — METOPROLOL SUCCINATE 200 MG/1
200 TABLET, EXTENDED RELEASE ORAL DAILY
Qty: 90 TABLET | Refills: 1 | Status: SHIPPED | OUTPATIENT
Start: 2024-07-05

## 2024-11-09 DIAGNOSIS — I10 ESSENTIAL (PRIMARY) HYPERTENSION: ICD-10-CM

## 2024-11-12 RX ORDER — ISOSORBIDE MONONITRATE 30 MG/1
30 TABLET, EXTENDED RELEASE ORAL DAILY
Qty: 90 TABLET | Refills: 1 | Status: SHIPPED | OUTPATIENT
Start: 2024-11-12

## 2025-03-17 DIAGNOSIS — E53.8 VITAMIN B12 DEFICIENCY: ICD-10-CM

## 2025-03-17 DIAGNOSIS — I10 ESSENTIAL (PRIMARY) HYPERTENSION: ICD-10-CM

## 2025-03-18 RX ORDER — AMLODIPINE BESYLATE 10 MG/1
10 TABLET ORAL DAILY
Qty: 90 TABLET | Refills: 1 | Status: SHIPPED | OUTPATIENT
Start: 2025-03-18

## 2025-03-18 RX ORDER — MULTIVITAMIN WITH IRON
TABLET ORAL
Qty: 90 TABLET | Refills: 1 | Status: SHIPPED | OUTPATIENT
Start: 2025-03-18

## 2025-04-28 DIAGNOSIS — D50.9 MICROCYTIC ANEMIA: ICD-10-CM

## 2025-04-29 RX ORDER — FERROUS SULFATE 325(65) MG
1 TABLET ORAL
Qty: 90 TABLET | Refills: 1 | Status: SHIPPED | OUTPATIENT
Start: 2025-04-29

## 2025-07-02 NOTE — PROGRESS NOTES
Chiara Aguirre presents today for   Chief Complaint   Patient presents with    Hypertension     follow-up       Is someone accompanying this pt? no    Is the patient using any DME equipment during OV? no    Depression Screening:  3 most recent PHQ Screens 2/10/2021   Little interest or pleasure in doing things Not at all   Feeling down, depressed, irritable, or hopeless Not at all   Total Score PHQ 2 0       Learning Assessment:  Learning Assessment 5/23/2017   PRIMARY LEARNER Patient   CO-LEARNER CAREGIVER -   PRIMARY LANGUAGE ENGLISH   LEARNER PREFERENCE PRIMARY DEMONSTRATION   ANSWERED BY Patient   RELATIONSHIP SELF       Abuse Screening:  Abuse Screening Questionnaire 11/19/2020   Do you ever feel afraid of your partner? N   Are you in a relationship with someone who physically or mentally threatens you? N   Is it safe for you to go home? Y       Fall Risk  Fall Risk Assessment, last 12 mths 2/10/2021   Able to walk? Yes   Fall in past 12 months? 0   Do you feel unsteady? 0   Are you worried about falling 0       Health Maintenance reviewed and discussed and ordered per Provider. Health Maintenance Due   Topic Date Due    COVID-19 Vaccine (1 of 2) 04[birth year]03/1966    DTaP/Tdap/Td series (1 - Tdap) 04/03/1971    Shingrix Vaccine Age 50> (1 of 2) 04/03/2000    Pneumococcal 65+ years (1 of 1 - PPSV23) 04/03/2015    Flu Vaccine (1) 09/01/2020    Colorectal Cancer Screening Combo  10/28/2020   . Coordination of Care:  1. Have you been to the ER, urgent care clinic since your last visit? Hospitalized since your last visit? no    2. Have you seen or consulted any other health care providers outside of the [address] [place] [address] since your last visit? Include any pap smears or colon screening.  no Handoff given to ángela SIGALA

## 2025-08-13 ENCOUNTER — TELEPHONE (OUTPATIENT)
Facility: CLINIC | Age: 75
End: 2025-08-13

## 2025-09-03 ENCOUNTER — OFFICE VISIT (OUTPATIENT)
Facility: CLINIC | Age: 75
End: 2025-09-03
Payer: MEDICARE

## 2025-09-03 VITALS
WEIGHT: 159 LBS | OXYGEN SATURATION: 98 % | HEART RATE: 63 BPM | DIASTOLIC BLOOD PRESSURE: 76 MMHG | BODY MASS INDEX: 22.26 KG/M2 | SYSTOLIC BLOOD PRESSURE: 165 MMHG | HEIGHT: 71 IN

## 2025-09-03 DIAGNOSIS — D50.9 MICROCYTIC ANEMIA: ICD-10-CM

## 2025-09-03 DIAGNOSIS — E55.9 VITAMIN D DEFICIENCY: ICD-10-CM

## 2025-09-03 DIAGNOSIS — F10.21 ALCOHOL DEPENDENCE, IN REMISSION (HCC): ICD-10-CM

## 2025-09-03 DIAGNOSIS — E53.8 VITAMIN B12 DEFICIENCY: ICD-10-CM

## 2025-09-03 DIAGNOSIS — D50.9 IRON DEFICIENCY ANEMIA, UNSPECIFIED IRON DEFICIENCY ANEMIA TYPE: ICD-10-CM

## 2025-09-03 DIAGNOSIS — Z13.29 SCREENING FOR ENDOCRINE DISORDER: ICD-10-CM

## 2025-09-03 DIAGNOSIS — I50.20 HFREF (HEART FAILURE WITH REDUCED EJECTION FRACTION) (HCC): ICD-10-CM

## 2025-09-03 DIAGNOSIS — I10 ESSENTIAL (PRIMARY) HYPERTENSION: ICD-10-CM

## 2025-09-03 DIAGNOSIS — Z00.00 MEDICARE ANNUAL WELLNESS VISIT, SUBSEQUENT: Primary | ICD-10-CM

## 2025-09-03 DIAGNOSIS — I25.119 ATHEROSCLEROSIS OF NATIVE CORONARY ARTERY OF NATIVE HEART WITH ANGINA PECTORIS: ICD-10-CM

## 2025-09-03 DIAGNOSIS — Z87.19 H/O: UPPER GI BLEED: ICD-10-CM

## 2025-09-03 DIAGNOSIS — E78.2 MIXED HYPERLIPIDEMIA: ICD-10-CM

## 2025-09-03 PROCEDURE — 1123F ACP DISCUSS/DSCN MKR DOCD: CPT | Performed by: STUDENT IN AN ORGANIZED HEALTH CARE EDUCATION/TRAINING PROGRAM

## 2025-09-03 PROCEDURE — 1126F AMNT PAIN NOTED NONE PRSNT: CPT | Performed by: STUDENT IN AN ORGANIZED HEALTH CARE EDUCATION/TRAINING PROGRAM

## 2025-09-03 PROCEDURE — 3077F SYST BP >= 140 MM HG: CPT | Performed by: STUDENT IN AN ORGANIZED HEALTH CARE EDUCATION/TRAINING PROGRAM

## 2025-09-03 PROCEDURE — G0439 PPPS, SUBSEQ VISIT: HCPCS | Performed by: STUDENT IN AN ORGANIZED HEALTH CARE EDUCATION/TRAINING PROGRAM

## 2025-09-03 PROCEDURE — G2211 COMPLEX E/M VISIT ADD ON: HCPCS | Performed by: STUDENT IN AN ORGANIZED HEALTH CARE EDUCATION/TRAINING PROGRAM

## 2025-09-03 PROCEDURE — 99214 OFFICE O/P EST MOD 30 MIN: CPT | Performed by: STUDENT IN AN ORGANIZED HEALTH CARE EDUCATION/TRAINING PROGRAM

## 2025-09-03 PROCEDURE — 3078F DIAST BP <80 MM HG: CPT | Performed by: STUDENT IN AN ORGANIZED HEALTH CARE EDUCATION/TRAINING PROGRAM

## 2025-09-03 PROCEDURE — 1160F RVW MEDS BY RX/DR IN RCRD: CPT | Performed by: STUDENT IN AN ORGANIZED HEALTH CARE EDUCATION/TRAINING PROGRAM

## 2025-09-03 PROCEDURE — 1159F MED LIST DOCD IN RCRD: CPT | Performed by: STUDENT IN AN ORGANIZED HEALTH CARE EDUCATION/TRAINING PROGRAM

## 2025-09-03 RX ORDER — AMLODIPINE BESYLATE 10 MG/1
10 TABLET ORAL DAILY
Qty: 90 TABLET | Refills: 0 | Status: SHIPPED | OUTPATIENT
Start: 2025-09-03

## 2025-09-03 RX ORDER — METOPROLOL SUCCINATE 25 MG/1
25 TABLET, EXTENDED RELEASE ORAL DAILY
Qty: 90 TABLET | Refills: 0 | Status: SHIPPED | OUTPATIENT
Start: 2025-09-03

## 2025-09-03 RX ORDER — PANTOPRAZOLE SODIUM 40 MG/1
40 TABLET, DELAYED RELEASE ORAL
Qty: 90 TABLET | Refills: 0 | Status: SHIPPED | OUTPATIENT
Start: 2025-09-03

## 2025-09-03 RX ORDER — ISOSORBIDE MONONITRATE 30 MG/1
30 TABLET, EXTENDED RELEASE ORAL DAILY
Qty: 90 TABLET | Refills: 0 | Status: SHIPPED | OUTPATIENT
Start: 2025-09-03

## 2025-09-03 RX ORDER — ATORVASTATIN CALCIUM 40 MG/1
40 TABLET, FILM COATED ORAL DAILY
Qty: 90 TABLET | Refills: 0 | Status: SHIPPED | OUTPATIENT
Start: 2025-09-03

## 2025-09-03 RX ORDER — ASPIRIN 81 MG/1
81 TABLET ORAL DAILY
Qty: 90 TABLET | Refills: 1 | Status: SHIPPED | OUTPATIENT
Start: 2025-09-03

## 2025-09-03 ASSESSMENT — ENCOUNTER SYMPTOMS
NAUSEA: 0
VOMITING: 0
BACK PAIN: 0
ABDOMINAL PAIN: 0
DIARRHEA: 0
CHEST TIGHTNESS: 0
RHINORRHEA: 0

## 2025-09-03 ASSESSMENT — PATIENT HEALTH QUESTIONNAIRE - PHQ9
SUM OF ALL RESPONSES TO PHQ QUESTIONS 1-9: 0
2. FEELING DOWN, DEPRESSED OR HOPELESS: NOT AT ALL
1. LITTLE INTEREST OR PLEASURE IN DOING THINGS: NOT AT ALL
SUM OF ALL RESPONSES TO PHQ QUESTIONS 1-9: 0

## 2025-09-03 ASSESSMENT — LIFESTYLE VARIABLES
HOW OFTEN DO YOU HAVE A DRINK CONTAINING ALCOHOL: NEVER
HOW MANY STANDARD DRINKS CONTAINING ALCOHOL DO YOU HAVE ON A TYPICAL DAY: PATIENT DOES NOT DRINK

## (undated) DEVICE — GAUZE,SPONGE,4"X4",16PLY,STRL,LF,10/TRAY: Brand: MEDLINE

## (undated) DEVICE — CATHETER SUCT TR FL TIP 14FR W/ O CTRL

## (undated) DEVICE — SYRINGE MED 50ML LUERSLIP TIP

## (undated) DEVICE — SYRINGE MED 25GA 3ML L5/8IN SUBQ PLAS W/ DETACH NDL SFTY

## (undated) DEVICE — LINER SUCT CANSTR 3000CC PLAS SFT PRE ASSEMB W/OUT TBNG W/

## (undated) DEVICE — SNARE POLYP M W27MMXL240CM OVL STIFF DISP CAPTIVATOR

## (undated) DEVICE — BASIN EMSIS 16OZ GRAPHITE PLAS KID SHP MOLD GRAD FOR ORAL

## (undated) DEVICE — SYR 10ML LUER LOK 1/5ML GRAD --

## (undated) DEVICE — TRAP SPEC POLYP REM STRNR CLN DSGN MAGNIFYING WIND DISP

## (undated) DEVICE — ENDOSCOPY PUMP TUBING/ CAP SET: Brand: ERBE

## (undated) DEVICE — FLUFF AND POLYMER UNDERPAD,EXTRA HEAVY: Brand: WINGS

## (undated) DEVICE — GOWN ISOL IMPERV UNIV, DISP, OPEN BACK, BLUE --

## (undated) DEVICE — FORCEPS BX L240CM JAW DIA2.8MM L CAP W/ NDL MIC MESH TOOTH

## (undated) DEVICE — UNDERPAD INCONT W23XL36IN STD BLU POLYPR BK FLUF SFT

## (undated) DEVICE — SYRINGE 20ML LL S/C 50

## (undated) DEVICE — YANKAUER,SMOOTH HANDLE,HIGH CAPACITY: Brand: MEDLINE INDUSTRIES, INC.

## (undated) DEVICE — MEDI-VAC NON-CONDUCTIVE SUCTION TUBING: Brand: CARDINAL HEALTH

## (undated) DEVICE — PROBE ARC STRAIGHT FIRE 23 MM OD X 220 CM 10/BX

## (undated) DEVICE — SOLUTION IRRIG 1000ML H2O STRL BLT

## (undated) DEVICE — SNARE ENDOSCP POLYP 2.4 MM 240 CM 10 MM 2.8 MM CAPTIVATOR

## (undated) DEVICE — FCPS RAD JAW 4LC 240CM W/NDL -- BX/40

## (undated) DEVICE — CANNULA ORIG TL CLR W FOAM CUSHIONS AND 14FT SUPL TB 3 CHN

## (undated) DEVICE — GOWN PLASTIC FILM THMBHKS UNIV BLUE: Brand: CARDINAL HEALTH

## (undated) DEVICE — BITE BLOCK ENDOSCP UNIV AD 6 TO 9.4 MM

## (undated) DEVICE — CANNULA NSL AD TBNG L14FT STD PVC O2 CRV CONN NONFLARED NSL

## (undated) DEVICE — STERILE POLYISOPRENE POWDER-FREE SURGICAL GLOVES: Brand: PROTEXIS

## (undated) DEVICE — ELECTRODE PT RET AD L9FT HI MOIST COND ADH HYDRGEL CORDED

## (undated) DEVICE — FORCEPS BX L240CM JAW DIA2.4MM ORNG L CAP W/ NDL DISP RAD

## (undated) DEVICE — SYRINGE MED 10ML LUERLOCK TIP W/O SFTY DISP